# Patient Record
Sex: FEMALE | Race: WHITE | NOT HISPANIC OR LATINO | Employment: OTHER | URBAN - METROPOLITAN AREA
[De-identification: names, ages, dates, MRNs, and addresses within clinical notes are randomized per-mention and may not be internally consistent; named-entity substitution may affect disease eponyms.]

---

## 2019-05-06 ENCOUNTER — TELEPHONE (OUTPATIENT)
Dept: TELEMEDICINE | Facility: HOSPITAL | Age: 59
End: 2019-05-06

## 2019-05-06 NOTE — TELEPHONE ENCOUNTER
(Physician in Lead of Transfers) /Regional Referral Center note    Referring Physician:   Zak Holcomb MD (ED)    : Aurora Tijerina MD    Date of Acceptance: 05/06/2019    Referring Facility: [facility]      Reason for Referral: Sepsis and newly Dx-ed metastatic breast CA with large vascular breast mass    Report from Transferring Physician/Hospital course: 58F w morbid obesity who presented to ED with mass of R breast 8 x 8 x 5 cm, came to hospital b/c it ruptured and her bed was full of blood, BP 79/44, WBC 23K (6% bands), H/H 9.3/31, lytes wnl, Alb 2.4.  Lactic acid 3.1.  Vanc IV, Zosyn 2.25g.  BUN 17, Cr 1.2.   GFR 49.  On 3rd L of NS.  CT with IV contrast showed large R breast mass involving nipple, 8.3cm, small area of enhancement, likely large LNs in posterior R breast and axilla, enhancing soft tissue mass in L breast 4.7cm, also masses involving R chest wall, R 2nd rib, and lungs.  Vascular mass on R breast bubbled out from skin, CT chest shows possible metastatic CA several enlarged lymph nodes possible L breast and lung involvement. Family lives in . high bleeding risk and not stable for d/c home to follow up outpt.      I discussed with Dr PARHAM that there are no med surg beds available at  right now, and since pt needs to go to  to be established with Onc in her area where family is, ideal to wait for a bed at  rather than transfer to another [facility], and Dr PARHAM agreeable    VS: 1726  Afeb, 82, 124/76, 96 on RA    To Do List: Dr Holcomb to take care of patient tonight (he is ER staff so covers hospital pts overnight) and call us back tomorrow morning, then when bed available at [facility] we will facilitate transfer there tomorrow.  Dr Saez on  tomorrow.     Aurora Tijerina MD  Hospital Medicine Staff  Pager: [phone]  Cell: [phone]

## 2019-05-07 ENCOUNTER — HOSPITAL ENCOUNTER (INPATIENT)
Facility: HOSPITAL | Age: 59
LOS: 8 days | Discharge: HOME | DRG: 598 | End: 2019-05-15
Attending: INTERNAL MEDICINE | Admitting: INTERNAL MEDICINE
Payer: MEDICARE

## 2019-05-07 DIAGNOSIS — N28.89 LEFT RENAL MASS: ICD-10-CM

## 2019-05-07 DIAGNOSIS — R92.8 ABNORMAL MAMMOGRAM OF BOTH BREASTS: ICD-10-CM

## 2019-05-07 DIAGNOSIS — N63.20 BREAST MASS, LEFT: ICD-10-CM

## 2019-05-07 DIAGNOSIS — R59.0 AXILLARY ADENOPATHY: ICD-10-CM

## 2019-05-07 DIAGNOSIS — D62 ACUTE BLOOD LOSS ANEMIA: ICD-10-CM

## 2019-05-07 DIAGNOSIS — I50.9 CHF (CONGESTIVE HEART FAILURE): ICD-10-CM

## 2019-05-07 DIAGNOSIS — N63.0 BREAST MASS: ICD-10-CM

## 2019-05-07 DIAGNOSIS — N63.10 BREAST MASS, RIGHT: Primary | ICD-10-CM

## 2019-05-07 DIAGNOSIS — N63.20 LEFT BREAST MASS: ICD-10-CM

## 2019-05-07 DIAGNOSIS — N63.0 LUMP OR MASS IN BREAST: ICD-10-CM

## 2019-05-07 PROBLEM — E66.01 MORBID OBESITY: Status: ACTIVE | Noted: 2019-05-07

## 2019-05-07 PROBLEM — I87.2 VENOUS STASIS DERMATITIS OF BOTH LOWER EXTREMITIES: Status: ACTIVE | Noted: 2019-05-07

## 2019-05-07 PROBLEM — D72.829 LEUKOCYTOSIS: Status: ACTIVE | Noted: 2019-05-07

## 2019-05-07 LAB
ALBUMIN SERPL BCP-MCNC: 2.4 G/DL (ref 3.5–5.2)
ALP SERPL-CCNC: 90 U/L (ref 55–135)
ALT SERPL W/O P-5'-P-CCNC: 14 U/L (ref 10–44)
ANION GAP SERPL CALC-SCNC: 7 MMOL/L (ref 8–16)
ANISOCYTOSIS BLD QL SMEAR: SLIGHT
AST SERPL-CCNC: 27 U/L (ref 10–40)
BASOPHILS # BLD AUTO: 0.02 K/UL (ref 0–0.2)
BASOPHILS NFR BLD: 0.1 % (ref 0–1.9)
BILIRUB SERPL-MCNC: 0.6 MG/DL (ref 0.1–1)
BNP SERPL-MCNC: 107 PG/ML (ref 0–99)
BUN SERPL-MCNC: 17 MG/DL (ref 6–20)
BURR CELLS BLD QL SMEAR: ABNORMAL
CALCIUM SERPL-MCNC: 8.9 MG/DL (ref 8.7–10.5)
CHLORIDE SERPL-SCNC: 101 MMOL/L (ref 95–110)
CO2 SERPL-SCNC: 24 MMOL/L (ref 23–29)
CREAT SERPL-MCNC: 0.9 MG/DL (ref 0.5–1.4)
DACRYOCYTES BLD QL SMEAR: ABNORMAL
DIASTOLIC DYSFUNCTION: NO
DIFFERENTIAL METHOD: ABNORMAL
EOSINOPHIL # BLD AUTO: 0 K/UL (ref 0–0.5)
EOSINOPHIL NFR BLD: 0.1 % (ref 0–8)
ERYTHROCYTE [DISTWIDTH] IN BLOOD BY AUTOMATED COUNT: 15.3 % (ref 11.5–14.5)
EST. GFR  (AFRICAN AMERICAN): >60 ML/MIN/1.73 M^2
EST. GFR  (NON AFRICAN AMERICAN): >60 ML/MIN/1.73 M^2
ESTIMATED PA SYSTOLIC PRESSURE: 41.18
GLUCOSE SERPL-MCNC: 141 MG/DL (ref 70–110)
HCT VFR BLD AUTO: 28.3 % (ref 37–48.5)
HGB BLD-MCNC: 9 G/DL (ref 12–16)
LACTATE SERPL-SCNC: 1.2 MMOL/L (ref 0.5–2.2)
LYMPHOCYTES # BLD AUTO: 1.2 K/UL (ref 1–4.8)
LYMPHOCYTES NFR BLD: 3.9 % (ref 18–48)
MCH RBC QN AUTO: 28 PG (ref 27–31)
MCHC RBC AUTO-ENTMCNC: 31.8 G/DL (ref 32–36)
MCV RBC AUTO: 88 FL (ref 82–98)
MONOCYTES # BLD AUTO: 1.4 K/UL (ref 0.3–1)
MONOCYTES NFR BLD: 4.7 % (ref 4–15)
NEUTROPHILS # BLD AUTO: 27.6 K/UL (ref 1.8–7.7)
NEUTROPHILS NFR BLD: 91.7 % (ref 38–73)
OVALOCYTES BLD QL SMEAR: ABNORMAL
PLATELET # BLD AUTO: 349 K/UL (ref 150–350)
PLATELET BLD QL SMEAR: ABNORMAL
PMV BLD AUTO: 9.7 FL (ref 9.2–12.9)
POIKILOCYTOSIS BLD QL SMEAR: SLIGHT
POLYCHROMASIA BLD QL SMEAR: ABNORMAL
POTASSIUM SERPL-SCNC: 4.2 MMOL/L (ref 3.5–5.1)
PROT SERPL-MCNC: 6.3 G/DL (ref 6–8.4)
RBC # BLD AUTO: 3.21 M/UL (ref 4–5.4)
RETIRED EF AND QEF - SEE NOTES: 65 (ref 55–65)
SODIUM SERPL-SCNC: 132 MMOL/L (ref 136–145)
TRICUSPID VALVE REGURGITATION: ABNORMAL
WBC # BLD AUTO: 30.26 K/UL (ref 3.9–12.7)

## 2019-05-07 PROCEDURE — 80053 COMPREHEN METABOLIC PANEL: CPT

## 2019-05-07 PROCEDURE — 85025 COMPLETE CBC W/AUTO DIFF WBC: CPT

## 2019-05-07 PROCEDURE — 25500020 PHARM REV CODE 255: Performed by: INTERNAL MEDICINE

## 2019-05-07 PROCEDURE — 11000001 HC ACUTE MED/SURG PRIVATE ROOM

## 2019-05-07 PROCEDURE — 25000003 PHARM REV CODE 250: Performed by: NURSE PRACTITIONER

## 2019-05-07 PROCEDURE — 63600175 PHARM REV CODE 636 W HCPCS: Performed by: INTERNAL MEDICINE

## 2019-05-07 PROCEDURE — 63600175 PHARM REV CODE 636 W HCPCS: Performed by: NURSE PRACTITIONER

## 2019-05-07 PROCEDURE — 87040 BLOOD CULTURE FOR BACTERIA: CPT | Mod: 59

## 2019-05-07 PROCEDURE — C8929 TTE W OR WO FOL WCON,DOPPLER: HCPCS

## 2019-05-07 PROCEDURE — 36415 COLL VENOUS BLD VENIPUNCTURE: CPT

## 2019-05-07 PROCEDURE — 93306 TTE W/DOPPLER COMPLETE: CPT | Mod: 26,,, | Performed by: INTERNAL MEDICINE

## 2019-05-07 PROCEDURE — 83880 ASSAY OF NATRIURETIC PEPTIDE: CPT

## 2019-05-07 PROCEDURE — 25500020 PHARM REV CODE 255: Performed by: NURSE PRACTITIONER

## 2019-05-07 PROCEDURE — 25000003 PHARM REV CODE 250: Performed by: INTERNAL MEDICINE

## 2019-05-07 PROCEDURE — 83605 ASSAY OF LACTIC ACID: CPT

## 2019-05-07 PROCEDURE — 93306 2D ECHO WITH COLOR FLOW DOPPLER: ICD-10-PCS | Mod: 26,,, | Performed by: INTERNAL MEDICINE

## 2019-05-07 RX ORDER — FUROSEMIDE 10 MG/ML
60 INJECTION INTRAMUSCULAR; INTRAVENOUS ONCE
Status: COMPLETED | OUTPATIENT
Start: 2019-05-07 | End: 2019-05-07

## 2019-05-07 RX ORDER — HYDROCODONE BITARTRATE AND ACETAMINOPHEN 7.5; 325 MG/1; MG/1
1 TABLET ORAL EVERY 6 HOURS PRN
Status: DISCONTINUED | OUTPATIENT
Start: 2019-05-07 | End: 2019-05-10

## 2019-05-07 RX ORDER — MIRTAZAPINE 15 MG/1
15 TABLET, FILM COATED ORAL NIGHTLY
Status: DISCONTINUED | OUTPATIENT
Start: 2019-05-07 | End: 2019-05-07

## 2019-05-07 RX ORDER — CEFEPIME HYDROCHLORIDE 2 G/50ML
2 INJECTION, SOLUTION INTRAVENOUS
Status: DISCONTINUED | OUTPATIENT
Start: 2019-05-07 | End: 2019-05-09

## 2019-05-07 RX ORDER — RAMELTEON 8 MG/1
8 TABLET ORAL NIGHTLY PRN
Status: DISCONTINUED | OUTPATIENT
Start: 2019-05-07 | End: 2019-05-15 | Stop reason: HOSPADM

## 2019-05-07 RX ORDER — FUROSEMIDE 10 MG/ML
40 INJECTION INTRAMUSCULAR; INTRAVENOUS 2 TIMES DAILY
Status: DISCONTINUED | OUTPATIENT
Start: 2019-05-08 | End: 2019-05-09

## 2019-05-07 RX ADMIN — CEFEPIME HYDROCHLORIDE 2 G: 2 INJECTION, SOLUTION INTRAVENOUS at 10:05

## 2019-05-07 RX ADMIN — HYDROCODONE BITARTRATE AND ACETAMINOPHEN 1 TABLET: 7.5; 325 TABLET ORAL at 08:05

## 2019-05-07 RX ADMIN — IOHEXOL 100 ML: 350 INJECTION, SOLUTION INTRAVENOUS at 06:05

## 2019-05-07 RX ADMIN — VANCOMYCIN HYDROCHLORIDE 3000 MG: 1 INJECTION, POWDER, FOR SOLUTION INTRAVENOUS at 07:05

## 2019-05-07 RX ADMIN — RAMELTEON 8 MG: 8 TABLET, FILM COATED ORAL at 10:05

## 2019-05-07 RX ADMIN — IOHEXOL 50 ML: 350 INJECTION, SOLUTION INTRAVENOUS at 04:05

## 2019-05-07 RX ADMIN — FUROSEMIDE 60 MG: 10 INJECTION, SOLUTION INTRAVENOUS at 04:05

## 2019-05-07 NOTE — PROGRESS NOTES
"[facility] Hospital Medicine  Progress Note    Patient Name: Isadora Colin  MRN: [id number]  Patient Class: IP- Inpatient   Admission Date: 5/7/2019  Length of Stay: 0 days  Attending Physician: Sagar Cloud, *  Primary Care Provider: JOLENE PHIPPS III, MD  Subjective:     Principal Problem:Breast mass, right    HPI:  Isadora Colin is a 58 year old female with history of morbid obesity, peripheral edema,chronic venous stasis dermatitis who presented to [facility] on May 6, 2018 for further evaluation of bleeding right breast mass. She recalls noticing the breast mass around 4 weeks ago or "so". This was never evaluated by a physician. She denies fever, abdominal pain, chills, or weight loss. In ED, she was noted hypotensive (79 systolic) without tachycardia which responded to IVF's. Labs reflected lactic acidosis, leukocytosis(23k), and normocytic anemia. Lactic acidosis resolved after IVF's.  CT Chest revealed 8,3 cm right breast mass, 4.7 cm left breast mass, mass involving right chest wall and rib, and surrounding lymph node involvement. She was treated empirically with Zosyn and Vancomycin empirically. Imaging negative for infectious process. [facility] contacted for transfer for further sepsis work up and Oncology consult. She had not further bleeding from protruding breast mass. She has received 2 units of blood. She has never had a mammogram or colonoscopy. She takes Lasix for swelling, but denies having an Echocardiogram.                     Hospital Course:  No notes on file    Past Medical History:   Diagnosis Date    Morbid obesity 5/7/2019    Venous stasis dermatitis of both lower extremities 5/7/2019       No past surgical history on file.    Review of patient's allergies indicates:  No Known Allergies    No current facility-administered medications on file prior to encounter.      No current outpatient medications on file prior to encounter. "     Family History     None        Tobacco Use    Smoking status: Not on file   Substance and Sexual Activity    Alcohol use: Not on file    Drug use: Not on file    Sexual activity: Not on file     Review of Systems   Constitutional: Negative for activity change, diaphoresis, fatigue and unexpected weight change.   HENT: Negative for congestion, ear pain and sore throat.    Eyes: Negative.    Respiratory: Negative for shortness of breath and wheezing.    Cardiovascular: Positive for leg swelling. Negative for chest pain and palpitations.   Gastrointestinal: Negative for abdominal pain, constipation, diarrhea and vomiting.   Endocrine: Negative.    Genitourinary: Negative for flank pain, hematuria and urgency.   Musculoskeletal: Negative for joint swelling and neck pain.   Skin: Negative for pallor.        Bleeding right breast mass     Neurological: Negative for seizures, syncope and light-headedness.   Hematological: Negative.    Psychiatric/Behavioral: Negative.       Objective:     Vital Signs (Most Recent):    Vital Signs (24h Range):  Temp:  [97.4 °F (36.3 °C)-98.2 °F (36.8 °C)] 97.4 °F (36.3 °C)  Pulse:  [82-97] 97  Resp:  [18-20] 20  SpO2:  [96 %-100 %] 100 %  BP: (124-146)/(72-76) 146/72        There is no height or weight on file to calculate BMI.    Physical Exam   Constitutional: She is oriented to person, place, and time. She appears well-developed and well-nourished.   HENT:   Head: Normocephalic and atraumatic.   Eyes: EOM are normal.   Neck: Normal range of motion. Neck supple.   Cardiovascular: Normal rate, regular rhythm and normal heart sounds.   No murmur heard.  Pulmonary/Chest: Effort normal and breath sounds normal. No respiratory distress.   Communicative dyspnea  Audible crackles noted   Abdominal: Soft. Bowel sounds are normal. She exhibits no distension. There is no tenderness.   Protuberant     Musculoskeletal: Normal range of motion. She exhibits no edema.   Neurological: She is  alert and oriented to person, place, and time.   Skin: Skin is warm and dry.   Venous stasis changes without signs of infection.    Right protruding vascular breast mass. Left palpable breast mass.    Psychiatric: Her mood appears anxious.         CRANIAL NERVES     CN III, IV, VI   Extraocular motions are normal.           Significant Labs: repeat labs are pending    Significant Imaging:               Assessment/Plan:      * Breast mass, right  --concerning for malignancy. CT at previous facility showed mass involving bilateral breast, lung, and bone. Will order CT abdomen Pelvis for further evaluation  --Oncology consulted  --General Surgery consulted for tissue biopsy        Acute blood loss anemia  --normocytic anemia s/p 2 units PRBC's   --could be related to malignancy  --has never had colonoscopy      Venous stasis dermatitis of both lower extremities  --No signs of infection  --daily skin care    Morbid obesity  Counseled on need for weight loss      Leukocytosis  -infectious etiology not found  --reactive vs ?sepsis which is unlikely  --CT Chest negative for infectious process. CT abdomen for further evaluation  --empiric Zosyn and Vancomycin      VTE Risk Mitigation (From admission, onward)    None              Trena Craig NP  Department of Hospital Medicine   [facility]

## 2019-05-07 NOTE — ASSESSMENT & PLAN NOTE
--normocytic anemia s/p 2 units PRBC's   --could be related to malignancy  --has never had colonoscopy

## 2019-05-07 NOTE — ASSESSMENT & PLAN NOTE
--concerning for malignancy. CT at previous facility showed mass involving bilateral breast, lung, and bone. Will order CT abdomen Pelvis for further evaluation  --Oncology consulted  --General Surgery consulted for tissue biopsy

## 2019-05-07 NOTE — NURSING
PT arrived to floor via air med stretcher. MD notified. Pt had blood finishing upon arrival. VSS. Alert and oriented. Awaiting orders.

## 2019-05-07 NOTE — PLAN OF CARE
(Physician in Lead of Transfers) /Regional Referral Center Note      Patients name/MRN: Isadora Colin/MRN [id number]     Referring Physician or Mid-Level provider giving report: Dr. De Rosas (Family Medicine)  Contact number: [phone]    Referral Facility: [facility] in [place], [place]    Date/Time of Acceptance: 5/7/2019 8:50 AM    : Priya Saez MD; Case discussed with GAMA Russ at [facility] for [facility] medicine and patient okay for Med/Surg bed at [facility] and Dr. Jd Newell to be accepting physician for Hospital Medicine at [facility]     Accepting facility: [facility]    Reason for transfer request: Needs higher level of care for bleeding mass from right breast; Needs Heme/Onc for likely metastatic breast cancer; Family from [facility] and family requesting hospital in [facility]     Report from Physician/Mid-Level Provider/HPI: 57 y/o female with morbid obesity and venous stasis dermatitis on oral Lasix at home who presented to [facility] ED on 5/6 noting bleeding from right breast. Patient noted to on exam mass to right breast that measures 8.5 cm. Patient came to hospital because it ruptured, and her bed was full of blood at home. Patient self-reported on and off bleeding from right breast for about 3 weeks. Initial BP 79/44, WBC 23,000 (6% bands), H/H 9.3/31. Electrolytes unremarkable except for BUN 17, Cr 1.2 with GFR 49 and Alb 2.4 and lactic acid 3.1. U/A negative for infection. Patient given IV Vancomycin and Zosyn 2.25 g + 3 liters of normal saline in ED overnight on 5/6. CT scan of chest done with IV contrast showed large right breast mass involving nipple, 8.3 cm, small area of enhancement, likely large LNs in posterior right breast and axilla, enhancing soft tissue mass in left breast 4.7 cm, also masses involving right chest wall, right 2nd rib, and lungs. No pneumonia or infiltrate noted on CT scan of  chest. Vascular mass on right breast bubbled out from skin, CT chest shows likely metastatic cancer with several enlarged lymph nodes possible left breast and lung involvement. Family lives in [place] and patient wanted facility in [facility]. High bleeding risk from right breast and not stable for discharge home to follow up outpatient.  This am, 5/7, Dr. Rosas at [facility] reports patient had minor bleeding from right breast overnight and H/H this am 7.3/24. Patient has been typed and crossed and plan is to start blood transfusion prior to transfer. Patient with very friable mass to right breast area. Patient medically stable and hemodynamically stable and okay for med/surg bed at [facility].    VS: BP: 146/76  P: 97  T: 97.4 F  R: 16 Pulse Ox: 100% on 2 liters    To Do List upon arrival:     Admit to Inpatient to Med/Surg   Monitor for bleeding from right breast   Serial H/H and transfuse to keep Hgb > 7   Work-up for infection    Will need biopsy to confirm malignancy and Heme/Onc evaluation     Priya Saez MD  Senior Staff Physician  Department of Hospital Medicine  Patient Flow Center/   [phone]

## 2019-05-07 NOTE — ASSESSMENT & PLAN NOTE
-infectious etiology not found  --reactive vs ?sepsis which is unlikely  --CT Chest negative for infectious process. CT abdomen for further evaluation  --empiric Zosyn and Vancomycin

## 2019-05-07 NOTE — SUBJECTIVE & OBJECTIVE
Past Medical History:   Diagnosis Date    Morbid obesity 5/7/2019    Venous stasis dermatitis of both lower extremities 5/7/2019       No past surgical history on file.    Review of patient's allergies indicates:  No Known Allergies    No current facility-administered medications on file prior to encounter.      No current outpatient medications on file prior to encounter.     Family History     None        Tobacco Use    Smoking status: Not on file   Substance and Sexual Activity    Alcohol use: Not on file    Drug use: Not on file    Sexual activity: Not on file     Review of Systems   Constitutional: Negative for activity change, diaphoresis, fatigue and unexpected weight change.   HENT: Negative for congestion, ear pain and sore throat.    Eyes: Negative.    Respiratory: Negative for shortness of breath and wheezing.    Cardiovascular: Positive for leg swelling. Negative for chest pain and palpitations.   Gastrointestinal: Negative for abdominal pain, constipation, diarrhea and vomiting.   Endocrine: Negative.    Genitourinary: Negative for flank pain, hematuria and urgency.   Musculoskeletal: Negative for joint swelling and neck pain.   Skin: Negative for pallor.        Bleeding right breast mass     Neurological: Negative for seizures, syncope and light-headedness.   Hematological: Negative.    Psychiatric/Behavioral: Negative.       Objective:     Vital Signs (Most Recent):    Vital Signs (24h Range):  Temp:  [97.4 °F (36.3 °C)-98.2 °F (36.8 °C)] 97.4 °F (36.3 °C)  Pulse:  [82-97] 97  Resp:  [18-20] 20  SpO2:  [96 %-100 %] 100 %  BP: (124-146)/(72-76) 146/72        There is no height or weight on file to calculate BMI.    Physical Exam   Constitutional: She is oriented to person, place, and time. She appears well-developed and well-nourished.   HENT:   Head: Normocephalic and atraumatic.   Eyes: EOM are normal.   Neck: Normal range of motion. Neck supple.   Cardiovascular: Normal rate, regular rhythm  and normal heart sounds.   No murmur heard.  Pulmonary/Chest: Effort normal and breath sounds normal. No respiratory distress.   Communicative dyspnea  Audible crackles noted   Abdominal: Soft. Bowel sounds are normal. She exhibits no distension. There is no tenderness.   Protuberant     Musculoskeletal: Normal range of motion. She exhibits no edema.   Neurological: She is alert and oriented to person, place, and time.   Skin: Skin is warm and dry.   Venous stasis changes without signs of infection.    Right protruding vascular breast mass. Left palpable breast mass.    Psychiatric: Her mood appears anxious.         CRANIAL NERVES     CN III, IV, VI   Extraocular motions are normal.           Significant Labs: repeat labs are pending    Significant Imaging:

## 2019-05-07 NOTE — HPI
"Isadora Colin is a 58 year old female with history of morbid obesity, peripheral edema,chronic venous stasis dermatitis who presented to [facility] on May 6, 2018 for further evaluation of bleeding right breast mass. She recalls noticing the breast mass around 4 weeks ago or "so". This was never evaluated by a physician. She denies fever, abdominal pain, chills, or weight loss. In ED, she was noted hypotensive (79 systolic) without tachycardia which responded to IVF's. Labs reflected lactic acidosis, leukocytosis(23k), and normocytic anemia. Lactic acidosis resolved after IVF's.  CT Chest revealed 8,3 cm right breast mass, 4.7 cm left breast mass, mass involving right chest wall and rib, and surrounding lymph node involvement. She was treated empirically with Zosyn and Vancomycin empirically. Imaging negative for infectious process. [facility] contacted for transfer for further sepsis work up and Oncology consult. She had not further bleeding from protruding breast mass. She has received 2 units of blood. She has never had a mammogram or colonoscopy. She takes Lasix for swelling, but denies having an Echocardiogram.                   "

## 2019-05-08 DIAGNOSIS — N63.20 LEFT BREAST MASS: ICD-10-CM

## 2019-05-08 DIAGNOSIS — N63.10 BREAST MASS, RIGHT: Primary | ICD-10-CM

## 2019-05-08 PROBLEM — N28.89 LEFT RENAL MASS: Status: ACTIVE | Noted: 2019-05-08

## 2019-05-08 PROBLEM — R93.89 ABNORMAL CHEST CT: Status: ACTIVE | Noted: 2019-05-08

## 2019-05-08 LAB
ANION GAP SERPL CALC-SCNC: 9 MMOL/L (ref 8–16)
BASOPHILS # BLD AUTO: 0.02 K/UL (ref 0–0.2)
BASOPHILS NFR BLD: 0.1 % (ref 0–1.9)
BUN SERPL-MCNC: 15 MG/DL (ref 6–20)
CALCIUM SERPL-MCNC: 8.8 MG/DL (ref 8.7–10.5)
CHLORIDE SERPL-SCNC: 102 MMOL/L (ref 95–110)
CO2 SERPL-SCNC: 23 MMOL/L (ref 23–29)
CREAT SERPL-MCNC: 0.7 MG/DL (ref 0.5–1.4)
DIFFERENTIAL METHOD: ABNORMAL
EOSINOPHIL # BLD AUTO: 0.1 K/UL (ref 0–0.5)
EOSINOPHIL NFR BLD: 0.3 % (ref 0–8)
ERYTHROCYTE [DISTWIDTH] IN BLOOD BY AUTOMATED COUNT: 15.4 % (ref 11.5–14.5)
EST. GFR  (AFRICAN AMERICAN): >60 ML/MIN/1.73 M^2
EST. GFR  (NON AFRICAN AMERICAN): >60 ML/MIN/1.73 M^2
GLUCOSE SERPL-MCNC: 146 MG/DL (ref 70–110)
HCT VFR BLD AUTO: 25.3 % (ref 37–48.5)
HGB BLD-MCNC: 8.1 G/DL (ref 12–16)
INR PPP: 0.9 (ref 0.8–1.2)
LYMPHOCYTES # BLD AUTO: 1.1 K/UL (ref 1–4.8)
LYMPHOCYTES NFR BLD: 5.3 % (ref 18–48)
MCH RBC QN AUTO: 27.9 PG (ref 27–31)
MCHC RBC AUTO-ENTMCNC: 32 G/DL (ref 32–36)
MCV RBC AUTO: 87 FL (ref 82–98)
MONOCYTES # BLD AUTO: 1.1 K/UL (ref 0.3–1)
MONOCYTES NFR BLD: 5.2 % (ref 4–15)
NEUTROPHILS # BLD AUTO: 18 K/UL (ref 1.8–7.7)
NEUTROPHILS NFR BLD: 89.1 % (ref 38–73)
PLATELET # BLD AUTO: 256 K/UL (ref 150–350)
PMV BLD AUTO: 9.4 FL (ref 9.2–12.9)
POTASSIUM SERPL-SCNC: 3.6 MMOL/L (ref 3.5–5.1)
PROTHROMBIN TIME: 10.1 SEC (ref 9–12.5)
RBC # BLD AUTO: 2.9 M/UL (ref 4–5.4)
SODIUM SERPL-SCNC: 134 MMOL/L (ref 136–145)
WBC # BLD AUTO: 20.15 K/UL (ref 3.9–12.7)

## 2019-05-08 PROCEDURE — 11000001 HC ACUTE MED/SURG PRIVATE ROOM

## 2019-05-08 PROCEDURE — 99223 1ST HOSP IP/OBS HIGH 75: CPT | Mod: ,,, | Performed by: INTERNAL MEDICINE

## 2019-05-08 PROCEDURE — 93010 EKG 12-LEAD: ICD-10-PCS | Mod: ,,, | Performed by: INTERNAL MEDICINE

## 2019-05-08 PROCEDURE — 63600175 PHARM REV CODE 636 W HCPCS: Performed by: NURSE PRACTITIONER

## 2019-05-08 PROCEDURE — 25500020 PHARM REV CODE 255: Performed by: INTERNAL MEDICINE

## 2019-05-08 PROCEDURE — 25000003 PHARM REV CODE 250: Performed by: NURSE PRACTITIONER

## 2019-05-08 PROCEDURE — 99222 1ST HOSP IP/OBS MODERATE 55: CPT | Mod: ICN,CMP,, | Performed by: RADIOLOGY

## 2019-05-08 PROCEDURE — 25000003 PHARM REV CODE 250: Performed by: INTERNAL MEDICINE

## 2019-05-08 PROCEDURE — 99233 PR SUBSEQUENT HOSPITAL CARE,LEVL III: ICD-10-PCS | Mod: ,,, | Performed by: SURGERY

## 2019-05-08 PROCEDURE — 93041 RHYTHM ECG TRACING: CPT

## 2019-05-08 PROCEDURE — 63600175 PHARM REV CODE 636 W HCPCS: Performed by: INTERNAL MEDICINE

## 2019-05-08 PROCEDURE — 80048 BASIC METABOLIC PNL TOTAL CA: CPT

## 2019-05-08 PROCEDURE — 99222 PR INITIAL HOSPITAL CARE,LEVL II: ICD-10-PCS | Mod: ICN,CMP,, | Performed by: RADIOLOGY

## 2019-05-08 PROCEDURE — 27000221 HC OXYGEN, UP TO 24 HOURS

## 2019-05-08 PROCEDURE — 99900035 HC TECH TIME PER 15 MIN (STAT)

## 2019-05-08 PROCEDURE — 93010 ELECTROCARDIOGRAM REPORT: CPT | Mod: ,,, | Performed by: INTERNAL MEDICINE

## 2019-05-08 PROCEDURE — 93005 ELECTROCARDIOGRAM TRACING: CPT

## 2019-05-08 PROCEDURE — 85025 COMPLETE CBC W/AUTO DIFF WBC: CPT

## 2019-05-08 PROCEDURE — 36415 COLL VENOUS BLD VENIPUNCTURE: CPT

## 2019-05-08 PROCEDURE — 99223 PR INITIAL HOSPITAL CARE,LEVL III: ICD-10-PCS | Mod: ,,, | Performed by: INTERNAL MEDICINE

## 2019-05-08 PROCEDURE — 85610 PROTHROMBIN TIME: CPT

## 2019-05-08 PROCEDURE — 99233 SBSQ HOSP IP/OBS HIGH 50: CPT | Mod: ,,, | Performed by: SURGERY

## 2019-05-08 RX ORDER — POTASSIUM CHLORIDE 750 MG/1
20 CAPSULE, EXTENDED RELEASE ORAL ONCE
Status: ON HOLD | COMMUNITY
End: 2019-05-15 | Stop reason: HOSPADM

## 2019-05-08 RX ORDER — FUROSEMIDE 40 MG/1
40 TABLET ORAL DAILY
COMMUNITY

## 2019-05-08 RX ADMIN — VANCOMYCIN HYDROCHLORIDE 1750 MG: 750 INJECTION, POWDER, LYOPHILIZED, FOR SOLUTION INTRAVENOUS at 08:05

## 2019-05-08 RX ADMIN — FUROSEMIDE 40 MG: 10 INJECTION, SOLUTION INTRAVENOUS at 05:05

## 2019-05-08 RX ADMIN — FUROSEMIDE 40 MG: 10 INJECTION, SOLUTION INTRAVENOUS at 08:05

## 2019-05-08 RX ADMIN — CEFEPIME HYDROCHLORIDE 2 G: 2 INJECTION, SOLUTION INTRAVENOUS at 06:05

## 2019-05-08 RX ADMIN — RAMELTEON 8 MG: 8 TABLET, FILM COATED ORAL at 09:05

## 2019-05-08 RX ADMIN — IOHEXOL 75 ML: 350 INJECTION, SOLUTION INTRAVENOUS at 06:05

## 2019-05-08 RX ADMIN — HYDROCODONE BITARTRATE AND ACETAMINOPHEN 1 TABLET: 7.5; 325 TABLET ORAL at 01:05

## 2019-05-08 RX ADMIN — HYDROCODONE BITARTRATE AND ACETAMINOPHEN 1 TABLET: 7.5; 325 TABLET ORAL at 09:05

## 2019-05-08 RX ADMIN — HYDROCODONE BITARTRATE AND ACETAMINOPHEN 1 TABLET: 7.5; 325 TABLET ORAL at 06:05

## 2019-05-08 RX ADMIN — CEFEPIME HYDROCHLORIDE 2 G: 2 INJECTION, SOLUTION INTRAVENOUS at 08:05

## 2019-05-08 RX ADMIN — VANCOMYCIN HYDROCHLORIDE 1750 MG: 750 INJECTION, POWDER, LYOPHILIZED, FOR SOLUTION INTRAVENOUS at 09:05

## 2019-05-08 NOTE — CONSULTS
[facility]  Hematology/Oncology  Consult Note    Patient Name: Isadora Colin  MRN: [id number]  Admission Date: 5/7/2019  Hospital Length of Stay: 1 days  Code Status: No Order   Attending Provider: Sagar Cloud, *  Consulting Provider: Blaire Arguello NP  Primary Care Physician: OJLENE PHIPPS III, MD  Principal Problem:Breast mass, right    Consults  Subjective:     HPI:  58 y.o female history of morbid obesity, peripheral edema (on Lasix at home),chronic venous stasis dermatitis who presented to [facility] on May 6, 2018 for further evaluation of bleeding right breast mass which patient reports onset approximately 4 weeks ago and has progressively gotten larger.  Associated symptoms include right breast pain.  Patient denies any other symptoms. She denies CP, SOB, bowel or urinary complaints, fever, abdominal pain, chills, or weight loss.     At [facility] in ED, she was noted hypotensive (79 systolic) without tachycardia which responded to IVF's. Labs reflected lactic acidosis, leukocytosis(23k), and normocytic anemia Hg 9.3. Lactic acidosis resolved after IVF's.  CT Chest revealed 8.3 cm right breast mass, 4.7 cm left breast mass, mass involving right chest wall and rib, and surrounding lymph node involvement. She was treated empirically with Zosyn and Vancomycin IV abx. Imaging negative for infectious process.     Patient was transferred to [facility] for higher level care. She has received 2 units of blood. She has never had a mammogram or colonoscopy. Does not have regular PCP.     CT Abdomen/Pelvis done at [facility] revealed 5.4 cm renal mass. ID consulted for elevated WBC. General surgery consulted for mass biopsy. General surgery awaiting r/o sepsis to proceed with biopsy               Oncology Treatment Plan:   [No treatment plan]    Medications:  Continuous Infusions:  Scheduled Meds:   ceFEPime (MAXIPIME) IVPB  2 g Intravenous Q12H     furosemide  40 mg Intravenous BID    vancomycin (VANCOCIN) IVPB  1,750 mg Intravenous Q12H     PRN Meds:HYDROcodone-acetaminophen, ramelteon     Review of patient's allergies indicates:  No Known Allergies     Past Medical History:   Diagnosis Date    Encounter for blood transfusion     Morbid obesity 5/7/2019    Venous stasis dermatitis of both lower extremities 5/7/2019     History reviewed. No pertinent surgical history.  Family History     None        Tobacco Use    Smoking status: Current Every Day Smoker     Packs/day: 1.00   Substance and Sexual Activity    Alcohol use: Not Currently    Drug use: Never    Sexual activity: Not Currently       Review of Systems   Constitutional: Negative for activity change, appetite change, chills, diaphoresis, fatigue, fever and unexpected weight change.   HENT: Negative for congestion, mouth sores, nosebleeds, sore throat, trouble swallowing and voice change.    Eyes: Negative for photophobia and visual disturbance.   Respiratory: Negative for cough, chest tightness, shortness of breath and wheezing.    Cardiovascular: Negative for chest pain, palpitations and leg swelling.   Gastrointestinal: Negative for abdominal distention, abdominal pain, anal bleeding, blood in stool, constipation, diarrhea, nausea, rectal pain and vomiting.   Genitourinary: Negative for difficulty urinating, dysuria and hematuria.   Musculoskeletal: Negative for arthralgias, back pain and myalgias.   Skin: Positive for wound. Negative for pallor and rash.   Neurological: Negative for dizziness, syncope, weakness, light-headedness and headaches.   Hematological: Negative for adenopathy. Does not bruise/bleed easily.   Psychiatric/Behavioral: The patient is nervous/anxious.      Objective:     Vital Signs (Most Recent):  Temp: 97.7 °F (36.5 °C) (05/08/19 1213)  Pulse: 83 (05/08/19 1213)  Resp: 18 (05/08/19 1213)  BP: 129/60 (05/08/19 1213)  SpO2: 98 % (05/08/19 1213) Vital Signs (24h  Range):  Temp:  [97.7 °F (36.5 °C)-99.6 °F (37.6 °C)] 97.7 °F (36.5 °C)  Pulse:  [83-95] 83  Resp:  [18-20] 18  SpO2:  [98 %-100 %] 98 %  BP: (102-129)/(45-60) 129/60     Weight: (!) 149.2 kg (329 lb)  Body mass index is 53.1 kg/m².  Body surface area is 2.64 meters squared.      Intake/Output Summary (Last 24 hours) at 5/8/2019 1217  Last data filed at 5/8/2019 0500  Gross per 24 hour   Intake 50 ml   Output 4200 ml   Net -4150 ml       Physical Exam   Constitutional: She is oriented to person, place, and time. She appears well-developed and well-nourished. She is cooperative.   HENT:   Head: Normocephalic.   Right Ear: Hearing normal. No drainage.   Left Ear: Hearing normal. No drainage.   Nose: Nose normal.   Mouth/Throat: Oropharynx is clear and moist.   Eyes: Conjunctivae, EOM and lids are normal. Right eye exhibits no discharge. Left eye exhibits no discharge. No scleral icterus.   Neck: Normal range of motion. No thyroid mass present.   Cardiovascular: Normal rate, regular rhythm and normal heart sounds.   No murmur heard.  Pulmonary/Chest: Effort normal. No respiratory distress. She has decreased breath sounds. She has no wheezes. She has no rhonchi. She has no rales. She exhibits mass. Right breast exhibits skin change and tenderness. Left breast exhibits no nipple discharge.       Abdominal: Soft. Bowel sounds are normal. She exhibits no distension. There is no tenderness.   Genitourinary:   Genitourinary Comments: deferred   Musculoskeletal: Normal range of motion. She exhibits edema.   BLE edema   Lymphadenopathy:        Head (right side): No submandibular, no preauricular and no posterior auricular adenopathy present.        Head (left side): No submandibular, no preauricular and no posterior auricular adenopathy present.        Right cervical: No superficial cervical adenopathy present.       Left cervical: No superficial cervical adenopathy present.   Neurological: She is alert and oriented to person,  place, and time.   Skin: Skin is warm and dry.   Psychiatric: Her speech is normal and behavior is normal. Thought content normal. Her mood appears anxious. She exhibits a depressed mood.   Vitals reviewed.      Significant Labs:   BMP:   Recent Labs   Lab 05/07/19 1715 05/08/19  0542   * 146*   * 134*   K 4.2 3.6    102   CO2 24 23   BUN 17 15   CREATININE 0.9 0.7   CALCIUM 8.9 8.8   , CBC:   Recent Labs   Lab 05/07/19 1715 05/08/19  0542   WBC 30.26* 20.15*   HGB 9.0* 8.1*   HCT 28.3* 25.3*    256   , LFTs:   Recent Labs   Lab 05/07/19 1715   ALT 14   AST 27   ALKPHOS 90   BILITOT 0.6   PROT 6.3   ALBUMIN 2.4*    and All pertinent labs from the last 24 hours have been reviewed.    Diagnostic Results:  I have reviewed all pertinent imaging results/findings within the past 24 hours.    Assessment/Plan:     * Breast mass, right  May 8, 2019  --General surgery consult for breast mass biopsy. Gen surg would like to r/o sepsis prior to proceeding  --I spoke with Dr. Cunha, Rad/Onc who agree to see patient to determine possible radiation options  --CT Chest/Abdomen/Pelvis concerning for metastatic disease. Likely more than one primary. We will need biopsy results and further staging to determine treatment recommendations  --hemoglobin 8.1. No urgent need for additional blood transfusion. Consider PRBCs transfusion for hg < 8. WBC 20.1, likely reactive. No clear infectious source identified. ID consulted. Await ID recs  --BMP unremarkable  --Continue supportive care    Left renal mass  May 8, 2019  --Outpatient consult to Urology for further workup. No urgent need for inpatient biopsy. Likely slow growing    Leukocytosis  May 8, 2019  --WBC trending downward. No clear etiology thus far. ID consulted. General surgery would like to r/o sepsis prior to perform biopsy. Continue empiric IV abx        Thank you for your consult. I will follow-up with patient. Please contact us if you have any  additional questions.    Blaire Arguello NP  Hematology/Oncology  [facility]

## 2019-05-08 NOTE — SUBJECTIVE & OBJECTIVE
Interval History: patient is overwhelmed with everything. She states that she may have to go back home for treatment and care. Updated on plan of care.    Review of Systems   Constitutional: Negative for activity change, diaphoresis, fatigue and unexpected weight change.   HENT: Negative for congestion, ear pain and sore throat.    Eyes: Negative.    Respiratory: Negative for shortness of breath and wheezing.    Cardiovascular: Positive for leg swelling. Negative for chest pain and palpitations.   Gastrointestinal: Negative for abdominal pain, constipation, diarrhea and vomiting.   Endocrine: Negative.    Genitourinary: Negative for flank pain, hematuria and urgency.   Musculoskeletal: Negative for joint swelling and neck pain.   Skin: Negative for pallor.        Bleeding right breast mass     Neurological: Negative for seizures, syncope and light-headedness.   Hematological: Negative.    Psychiatric/Behavioral: Negative.       Objective:     Vital Signs (Most Recent):  Temp: 99.6 °F (37.6 °C) (05/08/19 0720)  Pulse: 92 (05/08/19 0720)  Resp: 20 (05/08/19 0720)  BP: (!) 108/45 (05/08/19 0720)  SpO2: 98 % (05/08/19 0720) Vital Signs (24h Range):  Temp:  [98.3 °F (36.8 °C)-99.6 °F (37.6 °C)] 99.6 °F (37.6 °C)  Pulse:  [84-95] 92  Resp:  [20] 20  SpO2:  [98 %-100 %] 98 %  BP: (102-127)/(45-58) 108/45     Weight: (!) 149.2 kg (329 lb)  Body mass index is 53.1 kg/m².    Intake/Output Summary (Last 24 hours) at 5/8/2019 1119  Last data filed at 5/8/2019 0500  Gross per 24 hour   Intake 50 ml   Output 4200 ml   Net -4150 ml      Physical Exam   Constitutional: She is oriented to person, place, and time. She appears well-developed and well-nourished.   Tearful     HENT:   Head: Normocephalic and atraumatic.   Eyes: EOM are normal.   Neck: Normal range of motion. Neck supple.   Cardiovascular: Normal rate, regular rhythm and normal heart sounds.   No murmur heard.  Pulmonary/Chest: Effort normal and breath sounds normal. No  respiratory distress.   Communicative dyspnea improving     Abdominal: Soft. Bowel sounds are normal. She exhibits no distension. There is no tenderness.   Protuberant     Musculoskeletal: Normal range of motion. She exhibits no edema.   Neurological: She is alert and oriented to person, place, and time.   Skin: Skin is warm and dry.   Venous stasis changes without signs of infection.    Right protruding vascular breast mass. Left palpable breast mass.    Psychiatric: Her mood appears anxious.     Significant Labs:   CBC:   Recent Labs   Lab 05/07/19  1715 05/08/19  0542   WBC 30.26* 20.15*   HGB 9.0* 8.1*   HCT 28.3* 25.3*    256     CMP:   Recent Labs   Lab 05/07/19  1715 05/08/19  0542   * 134*   K 4.2 3.6    102   CO2 24 23   * 146*   BUN 17 15   CREATININE 0.9 0.7   CALCIUM 8.9 8.8   PROT 6.3  --    ALBUMIN 2.4*  --    BILITOT 0.6  --    ALKPHOS 90  --    AST 27  --    ALT 14  --    ANIONGAP 7* 9   EGFRNONAA >60 >60       Significant Imaging: I have reviewed all pertinent imaging results/findings within the past 24 hours.

## 2019-05-08 NOTE — SUBJECTIVE & OBJECTIVE
No current facility-administered medications on file prior to encounter.      No current outpatient medications on file prior to encounter.       Review of patient's allergies indicates:  No Known Allergies    Past Medical History:   Diagnosis Date    Encounter for blood transfusion     Morbid obesity 5/7/2019    Venous stasis dermatitis of both lower extremities 5/7/2019     History reviewed. No pertinent surgical history.  Family History     None        Tobacco Use    Smoking status: Current Every Day Smoker     Packs/day: 1.00   Substance and Sexual Activity    Alcohol use: Not Currently    Drug use: Never    Sexual activity: Not Currently     Review of Systems   Constitutional: Negative for appetite change, chills, fatigue, fever and unexpected weight change.   HENT: Negative for hearing loss and rhinorrhea.    Eyes: Negative for visual disturbance.   Respiratory: Positive for shortness of breath. Negative for apnea, cough and wheezing.    Cardiovascular: Negative for chest pain and palpitations.   Gastrointestinal: Negative for abdominal distention, abdominal pain, blood in stool, constipation, diarrhea, nausea and vomiting.   Genitourinary: Negative for dysuria, frequency and urgency.   Musculoskeletal: Negative for arthralgias and neck pain.   Skin: Negative for rash.        Venous stasis changes of the lower legs bilaterally   Neurological: Negative for seizures, weakness, numbness and headaches.   Hematological: Negative for adenopathy. Does not bruise/bleed easily.   Psychiatric/Behavioral: Negative for hallucinations. The patient is not nervous/anxious.      Objective:     Vital Signs (Most Recent):  Temp: 98.7 °F (37.1 °C) (05/08/19 1513)  Pulse: 83 (05/08/19 1513)  Resp: 18 (05/08/19 1513)  BP: (!) 110/53 (05/08/19 1513)  SpO2: 98 % (05/08/19 1513) Vital Signs (24h Range):  Temp:  [97.7 °F (36.5 °C)-99.6 °F (37.6 °C)] 98.7 °F (37.1 °C)  Pulse:  [83-94] 83  Resp:  [18-20] 18  SpO2:  [98 %-100 %]  98 %  BP: (102-129)/(45-60) 110/53     Weight: (!) 149.2 kg (329 lb)  Body mass index is 53.1 kg/m².    Physical Exam   Constitutional: She is oriented to person, place, and time. No distress.   Morbidly obese   HENT:   Head: Normocephalic and atraumatic.   Neck: Neck supple.   Cardiovascular: Normal rate, regular rhythm and normal heart sounds.   Pulmonary/Chest: Effort normal and breath sounds normal.   Abdominal: Soft. Bowel sounds are normal.   Morbidly obese   Neurological: She is alert and oriented to person, place, and time.   Skin: Skin is warm.   Venous stasis changes of the lower extremity.    Bilateral breast exam  \  On the right side there is a 5 x 5 fungating mass that bleeds approximately 2 cm above the nipple-areolar complex.  There is a fullness in the central portion of the breast.  There is a mild amount of skin changes.  There is a fullness in the lower aspects of the axilla.    On the left side there is mild skin changes with dimpling.  There is a fullness in the central region of breast.  There is no nipple discharges.  There is no axillary adenopathy   Nursing note and vitals reviewed.      Significant Labs:  CBC:   Recent Labs   Lab 05/08/19  0542   WBC 20.15*   RBC 2.90*   HGB 8.1*   HCT 25.3*      MCV 87   MCH 27.9   MCHC 32.0     BMP:   Recent Labs   Lab 05/08/19  0542   *   *   K 3.6      CO2 23   BUN 15   CREATININE 0.7   CALCIUM 8.8     CMP:   Recent Labs   Lab 05/07/19  1715 05/08/19  0542   * 146*   CALCIUM 8.9 8.8   ALBUMIN 2.4*  --    PROT 6.3  --    * 134*   K 4.2 3.6   CO2 24 23    102   BUN 17 15   CREATININE 0.9 0.7   ALKPHOS 90  --    ALT 14  --    AST 27  --    BILITOT 0.6  --        Significant Diagnostics:  CT: I have reviewed all pertinent results/findings within the past 24 hours and my personal findings are:   CT report is in the media section of the chart.  Concerning for bilateral breast cancer with metastatic disease     CT  of the chest is pending    Reading Physician Reading Date Result Priority   Ricky Alberts MD 5/7/2019       Narrative     EXAMINATION:  CT ABDOMEN PELVIS WITH CONTRAST    CLINICAL HISTORY:  breast mass rule out mets and infectious process;    TECHNIQUE:  Standard axial imaging performed extending from the lung bases through the pubic symphysis, following administration of intravenous contrast.  Additional 2D  reformatted sagittal and coronal images obtained.    COMPARISON:  None    FINDINGS:  Mild atelectasis the lung bases.    The liver is normal. Gallbladder is normal.  Portal vein is patent.    The spleen is normal in size and appearance.  The pancreas is normal.  The adrenal glands are normal.  Atherosclerotic changes of the abdominal aorta with mild ectasia.  No aneurysm.  IVC normal.  No retroperitoneal adenopathy.    Solid enhancing mass is identified along the medial margins of the mid and inferior left kidney, measuring 5.4 cm in maximal dimension.  The lesion is thought to arise from the medial and posterior left renal cortex and is highly suspicious for renal cell carcinoma.  No left renal vein or IVC tumor invasion.  Right ureter is nondilated and normal.    Right kidney and right ureter normal.    The stomach is normal.  The small intestine is normal.  The appendix is normal.  Diverticulosis sigmoid colon.  Negative for diverticulitis.  The rectum is normal.    The pelvis demonstrates collapsed bladder with Noriega catheter in place..    Regional bones demonstrate degenerative changes of the spine.  Old L5 spondylolytic defect noted.  No suspicious osseous lesions.  Abdominal and pelvic wall soft tissues are normal.      Impression       1. 5.4 cm solid enhancing mass left kidney highly suspicious for renal cell carcinoma.  2. No evidence of metastatic disease throughout the abdomen or pelvis.  All CT scans at this facility are performed  using dose modulation techniques as appropriate to  performed exam including the following:  automated exposure control; adjustment of mA and/or kV according to the patients size (this includes techniques or standardized protocols for targeted exams where dose is matched to indication/reason for exam: i.e. extremities or head);  iterative reconstruction technique.      Electronically signed by: Ricky Alberts MD  Date: 05/07/2019  Time: 19:14             Last Resulted: 05/07/19 19:14 Order Details View Encounter Lab and Collection Details Routing Result History            External Result Report     External Result Report   Narrative     EXAMINATION:  CT ABDOMEN PELVIS WITH CONTRAST    CLINICAL HISTORY:  breast mass rule out mets and infectious process;    TECHNIQUE:  Standard axial imaging performed extending from the lung bases through the pubic symphysis, following administration of intravenous contrast.  Additional 2D  reformatted sagittal and coronal images obtained.    COMPARISON:  None    FINDINGS:  Mild atelectasis the lung bases.    The liver is normal. Gallbladder is normal.  Portal vein is patent.    The spleen is normal in size and appearance.  The pancreas is normal.  The adrenal glands are normal.  Atherosclerotic changes of the abdominal aorta with mild ectasia.  No aneurysm.  IVC normal.  No retroperitoneal adenopathy.    Solid enhancing mass is identified along the medial margins of the mid and inferior left kidney, measuring 5.4 cm in maximal dimension.  The lesion is thought to arise from the medial and posterior left renal cortex and is highly suspicious for renal cell carcinoma.  No left renal vein or IVC tumor invasion.  Right ureter is nondilated and normal.    Right kidney and right ureter normal.    The stomach is normal.  The small intestine is normal.  The appendix is normal.  Diverticulosis sigmoid colon.  Negative for diverticulitis.  The rectum is normal.    The pelvis demonstrates collapsed bladder with Noriega catheter in  place..    Regional bones demonstrate degenerative changes of the spine.  Old L5 spondylolytic defect noted.  No suspicious osseous lesions.  Abdominal and pelvic wall soft tissues are normal.   Impression       1. 5.4 cm solid enhancing mass left kidney highly suspicious for renal cell carcinoma.  2. No evidence of metastatic disease throughout the abdomen or pelvis.  All CT scans at this facility are performed  using dose modulation techniques as appropriate to performed exam including the following:  automated exposure control; adjustment of mA and/or kV according to the patients size (this includes techniques or standardized protocols for targeted exams where dose is matched to indication/reason for exam: i.e. extremities or head);  iterative reconstruction technique.      Electronically signed by: Ricky Alberts MD  Date: 05/07/2019  Time: 19:14

## 2019-05-08 NOTE — ASSESSMENT & PLAN NOTE
May 8, 2019  --Outpatient consult to Urology for further workup. No urgent need for inpatient biopsy. Likely slow growing

## 2019-05-08 NOTE — CONSULTS
[facility]  RADIATION ONCOLOGY CONSULTATION    Name: Isadora Colin  : 1960      Patient Referred To Radiation Oncology By:  Dr. Zak Holcomb MD  [address]  [address] [facility] [place] [address]    DIAGNOSIS:  Likely metastatic breast cancer with bleeding right fungating breast mass, right kidney mass and lung masses    HISTORY OF PRESENT ILLNESS:  Isadora Colin is a pleasant 58 y.o. female who has been referred from [facility] in [place], [place] for she was admitted on May 6 with right breast bleeding and a 9 cm right breast mass.  She endorsed 3 weeks of prior right breast bleeding.  She had blood pressure of 79/44, 23,000 white blood cells and hemoglobin 9.3.  CT scan showed a right breast mass measured 8.3 cm, right breast and axillary lymph nodes, right chest wall, right 2nd rib and lung masses.  She was also given IV antibiotics.  Infectious Disease has been consulted as well as general surgery for biopsy.  She also has a 5.4 cm malignant appearing left renal mass.  She tells me the mass will shoot blood out and also loses.  She has a dressing in place currently.  The breast also hurts and she is on narcotics as an inpatient which is helping.    REVIEW OF SYSTEMS: (Positive findings bold, otherwise negative)   Constitutional: fever, fatigue, weight change  Eyes: blurred vision in the past 3 months, double vision   ENT: ear pain, new mouth lesions, jaw pain, difficulty swallowing, sore throat  Cardiovascular: chest pain on exertion, reflux, extremity swelling  Respiratory: shortness of breath, dyspnea, cough, hemoptysis.   GI: abdominal pain, diarrhea, constipation, blood in stool, painful bowel movements  : painful or burning urination, denies blood in urine  Musculoskeletal: new bone or joint pains  Neurologic: headache, seizure, focal numbness or tingling, balance changes, speech changes  Lymph: new or enlarged lymph nodes  Psychiatric: depression, anxiety    PRIOR  RADIATION HISTORY: None    PAST MEDICAL HISTORY:  Past Medical History:   Diagnosis Date    Encounter for blood transfusion     Morbid obesity 5/7/2019    Venous stasis dermatitis of both lower extremities 5/7/2019       PAST SURGICAL HISTORY:  History reviewed. No pertinent surgical history.    ALLERGIES:   Review of patient's allergies indicates:  No Known Allergies    MEDICATIONS:    Current Facility-Administered Medications:     ceFEPIme in dextrose 5% 2 gram/50 mL IVPB 2 g, 2 g, Intravenous, Q12H, Trena Craig NP, Last Rate: 100 mL/hr at 05/08/19 0642, 2 g at 05/08/19 0642    furosemide injection 40 mg, 40 mg, Intravenous, BID, Dreshellia Rafa, NP, 40 mg at 05/08/19 0843    HYDROcodone-acetaminophen 7.5-325 mg per tablet 1 tablet, 1 tablet, Oral, Q6H PRN, Trena Craig NP, 1 tablet at 05/08/19 0642    ramelteon tablet 8 mg, 8 mg, Oral, Nightly PRN, Trena Craig NP, 8 mg at 05/07/19 2225    vancomycin 1.75 g in 5 % dextrose 500 mL IVPB, 1,750 mg, Intravenous, Q12H, Sagar Cloud MD, Last Rate: 250 mL/hr at 05/08/19 0843, 1,750 mg at 05/08/19 0843    SOCIAL HISTORY:  Social History     Socioeconomic History    Marital status: Single     Spouse name: Not on file    Number of children: Not on file    Years of education: Not on file    Highest education level: Not on file   Occupational History    Not on file   Social Needs    Financial resource strain: Not on file    Food insecurity:     Worry: Not on file     Inability: Not on file    Transportation needs:     Medical: Not on file     Non-medical: Not on file   Tobacco Use    Smoking status: Current Every Day Smoker     Packs/day: 1.00   Substance and Sexual Activity    Alcohol use: Not Currently    Drug use: Never    Sexual activity: Not Currently   Lifestyle    Physical activity:     Days per week: Not on file     Minutes per session: Not on file    Stress: Not on file   Relationships    Social connections:      "Talks on phone: Not on file     Gets together: Not on file     Attends Tenriism service: Not on file     Active member of club or organization: Not on file     Attends meetings of clubs or organizations: Not on file     Relationship status: Not on file   Other Topics Concern    Not on file   Social History Narrative    Not on file       FAMILY HISTORY:  History reviewed. No pertinent family history.    PHYSICAL EXAMINATION:  Constitutional: well appearing, no acute distress, ECOG 2 - Ambulates, capable of self care only  Vitals:    /60 (BP Location: Right arm, Patient Position: Lying)   Pulse 83   Temp 97.7 °F (36.5 °C) (Oral)   Resp 18   Ht 5' 6" (1.676 m)   Wt (!) 149.2 kg (329 lb)   SpO2 98%   Breastfeeding? No   BMI 53.10 kg/m²   Neck: trachea midline, neck supple  Lymphatic: no cervical, supraclavicular adenopathy  Breast:  Right breast mass dressing.  The superior portion is removed.  There was an fungating breast mass in the upper central right breast.  The dressing is removed it bleeds.  I replaced the dressing and held a bit of pressure which appear to stop the bleeding.  Cardiovascular: regular rate, no murmurs, no edema of the upper or lower extremities, radial pulse 2+  Respiratory: unlabored effort, clear to auscultation, no wheezes  Abdomen: soft, non-tender, no rigidity, no masses, no hepatomegaly    IMAGING AND LABORATORY FINDINGS: As per HPI; images reviewed personally.    ASSESSMENT:  Likely metastatic breast cancer with bleeding right breast mass    PLAN:  We discussed the likely diagnosis.  The outside CT shows rib and lung masses and also a renal mass by CT at [facility].  The renal mass may be a 2nd primary tumor.  Biopsy has not been performed but general surgery has been consulted.  Radiation may be able to control her bleeding.  She lives [place] of [place] near [place] and there is a radiation oncologist in [place], however she needs control of the bleeding prior " to being discharged.  She will likely be an inpatient for least a few days to stabilize the bleeding and get a biopsy.  I do not see any General surgery nodes yet.  It is possible she will be offered mastectomy.  If not she will require radiation to control the bleeding.  We can start radiation in [place] and once she is discharged radiation can be resumed in [place] and I can contact the Radiation oncologist there.  I discussed the case with Medical Oncology, Blaire Arguello.  I have ordered a CT chest to access for metastases since I don't have the outside images to review.  If she does not seem to have metastases and the only abnormality other than the right breast and lymph nodes is the left kidney, she may have 2 separate primaries and could be treated with curative intent therapy in which case instead of radiation we could proceed with right breast mastectomy which will control the bleeding.  She may require postmastectomy radiation but this could be given closer to her home.   I will coordinate with Dr. Murphy with whom the patient has met.  I have tentatively placed her on the radiation schedule for radiation planning tomorrow.    We discussed the techniques, toxicities and indications of radiation and I answered the patient's questions to their apparent satisfaction.    MIRIAM Cunha M.D.  Radiation Oncology  [facility] [address] [place] [address]  Ph: [phone]  [email or web address]

## 2019-05-08 NOTE — PLAN OF CARE
Problem: Adult Inpatient Plan of Care  Goal: Plan of Care Review  Outcome: Ongoing (interventions implemented as appropriate)  POC reviewed, including indications and possible side effects of administered medications. Patient verbalized understanding and teach back. No adverse reactions noted. Patient c/o breast pain. Administered medications per order. Noriega catheter and wound care performed. No s/s of acute distress noted. Patient remains free of falls and injuries during shift. Daughter at bedside. Will continue to monitor.    Chart check complete.

## 2019-05-08 NOTE — PLAN OF CARE
Problem: Adult Inpatient Plan of Care  Goal: Plan of Care Review  Outcome: Ongoing (interventions implemented as appropriate)  Remains free from injury, Right breast dressing dry and intact, IV ABX continue, pain controlled with oral prn medication, family remains at bedside, 12 hour chart check reviewed, will continue to monitor

## 2019-05-08 NOTE — CONSULTS
Consulted on this 59 y/o F patient due to left breast and BLE wounds. Patient was transferred from [facility] due to a superficial tumor to right breast. ABD pad dressing currently intact. She states the surgeon came to see it this morning and re-dressed it at that time. She states he is coming back this afternoon with medical students to see the wound and requests not to remove dressing at this time. Her wishes are honored. Picture from medical record reviewed. Recommend when redressed, to cover wound with adaptic contact layer, then top with ABD pad, supplies left at bedside, and discussed with patient, as she is a retired nurse. BLE then assessed. Skin changes noted consistent with chronic venous stasis dermatitis including hemosiderin staining and dry flaky skin. No ulcers noted, however there is excoriation to Right shin, patient c/o urticaria and admits to scratching that area. sween cream applied to BLE and left at bedside. No other wound care needs. Will follow prn.

## 2019-05-08 NOTE — HPI
58 y.o female history of morbid obesity, peripheral edema (on Lasix at home),chronic venous stasis dermatitis who presented to [facility] on May 6, 2018 for further evaluation of bleeding right breast mass which patient reports onset approximately 4 weeks ago and has progressively gotten larger.  Associated symptoms include right breast pain.  Patient denies any other symptoms. She denies CP, SOB, bowel or urinary complaints, fever, abdominal pain, chills, or weight loss.     At [facility] in ED, she was noted hypotensive (79 systolic) without tachycardia which responded to IVF's. Labs reflected lactic acidosis, leukocytosis(23k), and normocytic anemia Hg 9.3. Lactic acidosis resolved after IVF's.  CT Chest revealed 8.3 cm right breast mass, 4.7 cm left breast mass, mass involving right chest wall and rib, and surrounding lymph node involvement. She was treated empirically with Zosyn and Vancomycin IV abx. Imaging negative for infectious process.     Patient was transferred to [facility] for higher level care. She has received 2 units of blood. She has never had a mammogram or colonoscopy. Does not have regular PCP.     CT Abdomen/Pelvis done at [facility] revealed 5.4 cm renal mass. ID consulted for elevated WBC. General surgery consulted for mass biopsy. General surgery awaiting r/o sepsis to proceed with biopsy

## 2019-05-08 NOTE — CONSULTS
[facility] -   General Surgery  Consult Note    Patient Name: Isadora Colin  MRN: [id number]  Code Status: No Order  Admission Date: 5/7/2019  Hospital Length of Stay: 1 days  Attending Physician: Sagar Cloud, *  Primary Care Provider: JOLENE PHIPPS III, MD    Patient information was obtained from patient and ER records.     Inpatient consult to General Surgery  Consult performed by: Rufus Murphy MD  Consult ordered by: Trena Craig NP  Reason for consult: Breast mass, bilateral breast masses and other findings concerning for metastatic breast cancer  Assessment/Recommendations: The patient needs bilateral mammograms ultrasounds and biopsies of any abnormalities seen.  I would recommend that this be done through a minimally invasive are image guided approach through normal skin asked opposed to biopsying the mass.    Unfortunately in our facility were not able to perform diagnostic mammography, breast ultrasound or breast biopsies.    This was discussed with the patient. Please see the full consult note        Subjective:     Principal Problem: Breast mass, right    History of Present Illness: The patient was accepted in transfer from the [facility] for a fungating bleeding right breast mass that was suspicious for breast cancer.  She underwent a CT scan of her chest that showed a right and left breast masses, right axillary lymph nodes and likely metastatic disease.  She was admitted by the medical service and surgery Oncology consult were obtained.  The patient had a CT scan of her abdomen that showed a renal mass.    The patient states she noticed a breast mass about 4 weeks ago.  She says it bleeds if it rubs on her clothes.  It is not associated with a significant pain.    No current facility-administered medications on file prior to encounter.      No current outpatient medications on file prior to encounter.       Review of patient's allergies indicates:  No Known  Allergies    Past Medical History:   Diagnosis Date    Encounter for blood transfusion     Morbid obesity 5/7/2019    Venous stasis dermatitis of both lower extremities 5/7/2019     History reviewed. No pertinent surgical history.  Family History     None        Tobacco Use    Smoking status: Current Every Day Smoker     Packs/day: 1.00   Substance and Sexual Activity    Alcohol use: Not Currently    Drug use: Never    Sexual activity: Not Currently     Review of Systems   Constitutional: Negative for appetite change, chills, fatigue, fever and unexpected weight change.   HENT: Negative for hearing loss and rhinorrhea.    Eyes: Negative for visual disturbance.   Respiratory: Positive for shortness of breath. Negative for apnea, cough and wheezing.    Cardiovascular: Negative for chest pain and palpitations.   Gastrointestinal: Negative for abdominal distention, abdominal pain, blood in stool, constipation, diarrhea, nausea and vomiting.   Genitourinary: Negative for dysuria, frequency and urgency.   Musculoskeletal: Negative for arthralgias and neck pain.   Skin: Negative for rash.        Venous stasis changes of the lower legs bilaterally   Neurological: Negative for seizures, weakness, numbness and headaches.   Hematological: Negative for adenopathy. Does not bruise/bleed easily.   Psychiatric/Behavioral: Negative for hallucinations. The patient is not nervous/anxious.      Objective:     Vital Signs (Most Recent):  Temp: 98.7 °F (37.1 °C) (05/08/19 1513)  Pulse: 83 (05/08/19 1513)  Resp: 18 (05/08/19 1513)  BP: (!) 110/53 (05/08/19 1513)  SpO2: 98 % (05/08/19 1513) Vital Signs (24h Range):  Temp:  [97.7 °F (36.5 °C)-99.6 °F (37.6 °C)] 98.7 °F (37.1 °C)  Pulse:  [83-94] 83  Resp:  [18-20] 18  SpO2:  [98 %-100 %] 98 %  BP: (102-129)/(45-60) 110/53     Weight: (!) 149.2 kg (329 lb)  Body mass index is 53.1 kg/m².    Physical Exam   Constitutional: She is oriented to person, place, and time. No distress.    Morbidly obese   HENT:   Head: Normocephalic and atraumatic.   Neck: Neck supple.   Cardiovascular: Normal rate, regular rhythm and normal heart sounds.   Pulmonary/Chest: Effort normal and breath sounds normal.   Abdominal: Soft. Bowel sounds are normal.   Morbidly obese   Neurological: She is alert and oriented to person, place, and time.   Skin: Skin is warm.   Venous stasis changes of the lower extremity.    Bilateral breast exam  \  On the right side there is a 5 x 5 fungating mass that bleeds approximately 2 cm above the nipple-areolar complex.  There is a fullness in the central portion of the breast.  There is a mild amount of skin changes.  There is a fullness in the lower aspects of the axilla.    On the left side there is mild skin changes with dimpling.  There is a fullness in the central region of breast.  There is no nipple discharges.  There is no axillary adenopathy   Nursing note and vitals reviewed.      Significant Labs:  CBC:   Recent Labs   Lab 05/08/19  0542   WBC 20.15*   RBC 2.90*   HGB 8.1*   HCT 25.3*      MCV 87   MCH 27.9   MCHC 32.0     BMP:   Recent Labs   Lab 05/08/19  0542   *   *   K 3.6      CO2 23   BUN 15   CREATININE 0.7   CALCIUM 8.8     CMP:   Recent Labs   Lab 05/07/19  1715 05/08/19  0542   * 146*   CALCIUM 8.9 8.8   ALBUMIN 2.4*  --    PROT 6.3  --    * 134*   K 4.2 3.6   CO2 24 23    102   BUN 17 15   CREATININE 0.9 0.7   ALKPHOS 90  --    ALT 14  --    AST 27  --    BILITOT 0.6  --        Significant Diagnostics:  CT: I have reviewed all pertinent results/findings within the past 24 hours and my personal findings are:   CT report is in the media section of the chart.  Concerning for bilateral breast cancer with metastatic disease     CT of the chest is pending    Reading Physician Reading Date Result Priority   Ricky Alberts MD 5/7/2019       Narrative     EXAMINATION:  CT ABDOMEN PELVIS WITH CONTRAST    CLINICAL  HISTORY:  breast mass rule out mets and infectious process;    TECHNIQUE:  Standard axial imaging performed extending from the lung bases through the pubic symphysis, following administration of intravenous contrast.  Additional 2D  reformatted sagittal and coronal images obtained.    COMPARISON:  None    FINDINGS:  Mild atelectasis the lung bases.    The liver is normal. Gallbladder is normal.  Portal vein is patent.    The spleen is normal in size and appearance.  The pancreas is normal.  The adrenal glands are normal.  Atherosclerotic changes of the abdominal aorta with mild ectasia.  No aneurysm.  IVC normal.  No retroperitoneal adenopathy.    Solid enhancing mass is identified along the medial margins of the mid and inferior left kidney, measuring 5.4 cm in maximal dimension.  The lesion is thought to arise from the medial and posterior left renal cortex and is highly suspicious for renal cell carcinoma.  No left renal vein or IVC tumor invasion.  Right ureter is nondilated and normal.    Right kidney and right ureter normal.    The stomach is normal.  The small intestine is normal.  The appendix is normal.  Diverticulosis sigmoid colon.  Negative for diverticulitis.  The rectum is normal.    The pelvis demonstrates collapsed bladder with Noriega catheter in place..    Regional bones demonstrate degenerative changes of the spine.  Old L5 spondylolytic defect noted.  No suspicious osseous lesions.  Abdominal and pelvic wall soft tissues are normal.      Impression       1. 5.4 cm solid enhancing mass left kidney highly suspicious for renal cell carcinoma.  2. No evidence of metastatic disease throughout the abdomen or pelvis.  All CT scans at this facility are performed  using dose modulation techniques as appropriate to performed exam including the following:  automated exposure control; adjustment of mA and/or kV according to the patients size (this includes techniques or standardized protocols for targeted  exams where dose is matched to indication/reason for exam: i.e. extremities or head);  iterative reconstruction technique.      Electronically signed by: Ricky Alberts MD  Date: 05/07/2019  Time: 19:14             Last Resulted: 05/07/19 19:14 Order Details View Encounter Lab and Collection Details Routing Result History            External Result Report     External Result Report   Narrative     EXAMINATION:  CT ABDOMEN PELVIS WITH CONTRAST    CLINICAL HISTORY:  breast mass rule out mets and infectious process;    TECHNIQUE:  Standard axial imaging performed extending from the lung bases through the pubic symphysis, following administration of intravenous contrast.  Additional 2D  reformatted sagittal and coronal images obtained.    COMPARISON:  None    FINDINGS:  Mild atelectasis the lung bases.    The liver is normal. Gallbladder is normal.  Portal vein is patent.    The spleen is normal in size and appearance.  The pancreas is normal.  The adrenal glands are normal.  Atherosclerotic changes of the abdominal aorta with mild ectasia.  No aneurysm.  IVC normal.  No retroperitoneal adenopathy.    Solid enhancing mass is identified along the medial margins of the mid and inferior left kidney, measuring 5.4 cm in maximal dimension.  The lesion is thought to arise from the medial and posterior left renal cortex and is highly suspicious for renal cell carcinoma.  No left renal vein or IVC tumor invasion.  Right ureter is nondilated and normal.    Right kidney and right ureter normal.    The stomach is normal.  The small intestine is normal.  The appendix is normal.  Diverticulosis sigmoid colon.  Negative for diverticulitis.  The rectum is normal.    The pelvis demonstrates collapsed bladder with Noriega catheter in place..    Regional bones demonstrate degenerative changes of the spine.  Old L5 spondylolytic defect noted.  No suspicious osseous lesions.  Abdominal and pelvic wall soft tissues are normal.    Impression       1. 5.4 cm solid enhancing mass left kidney highly suspicious for renal cell carcinoma.  2. No evidence of metastatic disease throughout the abdomen or pelvis.  All CT scans at this facility are performed  using dose modulation techniques as appropriate to performed exam including the following:  automated exposure control; adjustment of mA and/or kV according to the patients size (this includes techniques or standardized protocols for targeted exams where dose is matched to indication/reason for exam: i.e. extremities or head);  iterative reconstruction technique.      Electronically signed by: Ricky Alberts MD  Date: 05/07/2019  Time: 19:14            Assessment/Plan:     * Breast mass, right  Highly concerning for breast cancer.  There is also axillary adenopathy.    The patient needs a diagnostic right mammogram right ultrasound and biopsy of the right axillary lymph nodes.  Unfortunately these procedures cannot be done at this facility    Left renal mass  Further management and workup per Oncology/hospital Medicine    Venous stasis dermatitis of both lower extremities  Management per Medicine    Morbid obesity  This should be addressed with her primary care doctor after discharge    Leukocytosis  Likely secondary to the malignancy      VTE Risk Mitigation (From admission, onward)    None        In short the patient needs bilateral diagnostic mammograms and ultrasounds.  Bilateral axillary ultrasounds and biopsies of all suspicious lesions.    These procedures cannot be done in this facility due to regulations.      The options include obtaining diagnostic mammogram and ultrasounds at the [facility] outpatient clinic tomorrow.  A biopsy of all abnormalities could then be arranged to be done in the future at the clinic at the gross    Transfer on a day pass to the growth clinic where diagnostic imaging and biopsies could be done at 1 setting if the radiology department there was able to  accommodate this      Consider transferred to [facility] if they are capable of doing an inpatient breast cancer workup that would require mammograms ultrasounds and biopsies      The patient explained to me that she really has no where to stay in [place]    Arrangements can be made for them to be done as an outpatient      Thank you for your consult. I will follow-up with patient. Please contact us if you have any additional questions.    Rufus Murphy MD  General Surgery  [facility]

## 2019-05-08 NOTE — H&P
"[facility] Hospital Medicine  History & Physical    Patient Name: Isadora Colin  MRN: [id number]  Admission Date: 5/7/2019   Attending Physician: Sagar Cloud, *   Primary Care Provider: JOLENE PHIPPS III, MD    Patient information was obtained from patient and ER records.     Subjective:     Principal Problem:Breast mass, right    Chief Complaint: No chief complaint on file.       HPI: Isadora Colin is a 58 year old female with history of morbid obesity, peripheral edema,chronic venous stasis dermatitis who presented to [facility] on May 6, 2018 for further evaluation of bleeding right breast mass. She recalls noticing the breast mass around 4 weeks ago or "so". This was never evaluated by a physician. She denies fever, abdominal pain, chills, or weight loss. In ED, she was noted hypotensive (79 systolic) without tachycardia which responded to IVF's. Labs reflected lactic acidosis, leukocytosis(23k), and normocytic anemia. Lactic acidosis resolved after IVF's.  CT Chest revealed 8,3 cm right breast mass, 4.7 cm left breast mass, mass involving right chest wall and rib, and surrounding lymph node involvement. She was treated empirically with Zosyn and Vancomycin empirically. Imaging negative for infectious process. [facility] contacted for transfer for further sepsis work up and Oncology consult. She had not further bleeding from protruding breast mass. She has received 2 units of blood. She has never had a mammogram or colonoscopy. She takes Lasix for swelling, but denies having an Echocardiogram.     Past Medical History:   Diagnosis Date    Morbid obesity 5/7/2019    Venous stasis dermatitis of both lower extremities 5/7/2019       No past surgical history on file.    Review of patient's allergies indicates:  No Known Allergies    No current facility-administered medications on file prior to encounter.      No current outpatient medications on file prior " to encounter.     Family History     None        Tobacco Use    Smoking status: Not on file   Substance and Sexual Activity    Alcohol use: Not on file    Drug use: Not on file    Sexual activity: Not on file     Review of Systems   Constitutional: Negative for activity change, diaphoresis, fatigue and unexpected weight change.   HENT: Negative for congestion, ear pain and sore throat.    Eyes: Negative.    Respiratory: Negative for shortness of breath and wheezing.    Cardiovascular: Positive for leg swelling. Negative for chest pain and palpitations.   Gastrointestinal: Negative for abdominal pain, constipation, diarrhea and vomiting.   Endocrine: Negative.    Genitourinary: Negative for flank pain, hematuria and urgency.   Musculoskeletal: Negative for joint swelling and neck pain.   Skin: Negative for pallor.        Bleeding right breast mass     Neurological: Negative for seizures, syncope and light-headedness.   Hematological: Negative.    Psychiatric/Behavioral: Negative.       Objective:     Vital Signs (Most Recent):    Vital Signs (24h Range):  Temp:  [97.4 °F (36.3 °C)-98.2 °F (36.8 °C)] 97.4 °F (36.3 °C)  Pulse:  [82-97] 97  Resp:  [18-20] 20  SpO2:  [96 %-100 %] 100 %  BP: (124-146)/(72-76) 146/72        There is no height or weight on file to calculate BMI.    Physical Exam   Constitutional: She is oriented to person, place, and time. She appears well-developed and well-nourished.   HENT:   Head: Normocephalic and atraumatic.   Eyes: EOM are normal.   Neck: Normal range of motion. Neck supple.   Cardiovascular: Normal rate, regular rhythm and normal heart sounds.   No murmur heard.  Pulmonary/Chest: Effort normal and breath sounds normal. No respiratory distress.   Communicative dyspnea  Audible crackles noted   Abdominal: Soft. Bowel sounds are normal. She exhibits no distension. There is no tenderness.   Protuberant     Musculoskeletal: Normal range of motion. She exhibits no edema.    Neurological: She is alert and oriented to person, place, and time.   Skin: Skin is warm and dry.   Venous stasis changes without signs of infection.    Right protruding vascular breast mass. Left palpable breast mass.    Psychiatric: Her mood appears anxious.         CRANIAL NERVES     CN III, IV, VI   Extraocular motions are normal.           Significant Labs: repeat labs are pending    Significant Imaging:               Assessment/Plan:     * Breast mass, right  --concerning for malignancy. CT at previous facility showed mass involving bilateral breast, lung, and bone. Will order CT abdomen Pelvis for further evaluation  --Oncology consulted  --General Surgery consulted for tissue biopsy        Acute blood loss anemia  --normocytic anemia s/p 2 units PRBC's   --could be related to malignancy  --has never had colonoscopy      Venous stasis dermatitis of both lower extremities  --No signs of infection  --daily skin care    Morbid obesity  Counseled on need for weight loss      Leukocytosis  -infectious etiology not found  --reactive vs ?sepsis which is unlikely  --CT Chest negative for infectious process. CT abdomen for further evaluation  --empiric Zosyn and Vancomycin      VTE Risk Mitigation (From admission, onward)    None        Trena Craig NP  Department of Hospital Medicine   [facility]

## 2019-05-08 NOTE — ASSESSMENT & PLAN NOTE
--normocytic anemia s/p 2 units PRBC's   --could be related to malignancy  --has never had colonoscopy    5/8/19  Hemoglobin 8.1. Down from 9. Closely monitor

## 2019-05-08 NOTE — ASSESSMENT & PLAN NOTE
May 8, 2019  --General surgery consult for breast mass biopsy. Gen surg would like to r/o sepsis prior to proceeding  --I spoke with Dr. Cunha, Rad/Onc who agree to see patient to determine possible radiation options  --CT Chest/Abdomen/Pelvis concerning for metastatic disease. Likely more than one primary. We will need biopsy results and further staging to determine treatment recommendations  --hemoglobin 8.1. No urgent need for additional blood transfusion. Consider PRBCs transfusion for hg < 8. WBC 20.1, likely reactive. No clear infectious source identified. ID consulted. Await ID recs  --BMP unremarkable  --Continue supportive care

## 2019-05-08 NOTE — PLAN OF CARE
Pt admitted from [facility] after she presented with bleeding from the right breast.  Subsequently a mass was found and pt was transferred to [facility] for further workup and a biopsy to confirm the malignancy.  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  The pt has a bedside commode and standard walker at home but reports she rarely has to use them as she is still mostly independent with ADLs.  Pt source of income is [employer] which she receives monthly.  At this point in pt care, the pt is unsure what her dc needs will be if any.  CM provided a transitional care folder, information on advanced directives, information on pharmacy bedside delivery, and discharge planning begins on admission with contact information for any needs/questions.    D/C plan: to be determined       05/08/19 1058   Discharge Assessment   Assessment Type Discharge Planning Assessment   Confirmed/corrected address and phone number on facesheet? Yes   Assessment information obtained from? Patient;Caregiver;Medical Record   Prior to hospitilization cognitive status: Alert/Oriented   Prior to hospitalization functional status: Assistive Equipment   Current cognitive status: Alert/Oriented   Current Functional Status: Assistive Equipment   Facility Arrived From: home    Lives With parent(s)   Able to Return to Prior Arrangements yes   Is patient able to care for self after discharge? Yes   Who are your caregiver(s) and their phone number(s)? Anahi Colin, daughter: [phone]; Angela Fischer, sister: [phone]   Patient's perception of discharge disposition other (comments)  (unable to determine at this time)   Readmission Within the Last 30 Days no previous admission in last 30 days   Patient currently being followed by outpatient case management? No   Patient currently receives any other outside agency services? No   Equipment Currently Used at Home bedside commode;walker,  standard   Do you have any problems affording any of your prescribed medications? TBD   Is the patient taking medications as prescribed? yes   Does the patient have transportation home? Yes   Transportation Anticipated family or friend will provide   Does the patient receive services at the Coumadin Clinic? No   Discharge Plan A Other  (unable to determine at this time )   DME Needed Upon Discharge  none   Patient/Family in Agreement with Plan yes

## 2019-05-08 NOTE — ASSESSMENT & PLAN NOTE
--concerning for malignancy. CT at previous facility showed mass involving bilateral breast, lung, and bone. Will order CT abdomen Pelvis for further evaluation  --Oncology consulted  --General Surgery consulted for tissue biopsy    5/8/19 plans to perform biopsy once sepsis rule out or treatment

## 2019-05-08 NOTE — ASSESSMENT & PLAN NOTE
Highly concerning for breast cancer.  There is also axillary adenopathy.    The patient needs a diagnostic right mammogram right ultrasound and biopsy of the right axillary lymph nodes.  Unfortunately these procedures cannot be done at this facility

## 2019-05-08 NOTE — CONSULTS
Pharmacokinetic Initial Assessment: IV Vancomycin    Assessment/Plan:  Initiate intravenous vancomycin with a loading dose of 3000 mg once followed by a maintenance dose of vancomycin 1750 mg IV every 12 hours (chose slightly lower maintenance dose vs 2gm q12h since this is likely not infectious per NP notes, pt received IV contrast tonight, & has no maintenance fluids running)      Desired empiric serum trough concentration is 10 to 20 mcg/mL.  Draw vancomycin trough level 30 min prior to fourth dose on 05/09 at approximately 0700     Pharmacy will continue to follow and monitor vancomycin.      Please contact pharmacy at extension [phone] with any questions regarding this assessment.     Thank you for the consult,   Katherine McArdle, Pharm.D. 5/7/2019 8:27 PM       Patient brief summary:  Isadora Colin is a 58 y.o. female initiated on antimicrobial therapy with IV vancomycin for treatment of possible sepsis vs reactive leukocytosis (CT chest negative for infectious process)     Drug Allergies:   Review of patient's allergies indicates:  No Known Allergies    Actual Body Weight:   149.2 kg    Renal Function:   Estimated Creatinine Clearance: 102.5 mL/min (based on SCr of 0.9 mg/dL).    CBC (last 72 hours):  Recent Labs   Lab Result Units 05/07/19  1715   WBC K/uL 30.26*   Hemoglobin g/dL 9.0*   Hematocrit % 28.3*   Platelets K/uL 349   Gran% % 91.7*   Lymph% % 3.9*   Mono% % 4.7   Eosinophil% % 0.1   Basophil% % 0.1   Differential Method  Automated       Metabolic Panel (last 72 hours):  Recent Labs   Lab Result Units 05/07/19  1715   Sodium mmol/L 132*   Potassium mmol/L 4.2   Chloride mmol/L 101   CO2 mmol/L 24   Glucose mg/dL 141*   BUN, Bld mg/dL 17   Creatinine mg/dL 0.9   Albumin g/dL 2.4*   Total Bilirubin mg/dL 0.6   Alkaline Phosphatase U/L 90   AST U/L 27   ALT U/L 14       Microbiologic Results:  Microbiology Results (last 7 days)       ** No results found for the last 168 hours. **

## 2019-05-08 NOTE — HOSPITAL COURSE
Isadora Canas is a 58 year old female who was admitted to [facility] for further evaluation of right breast mass and leukocytosis. CT at previous facility shows 8 cm right breast mass, 4 cm left breast mass, and lung/bone/lymph node involvement suspicious for metastatic disease. Oncology consulted who recommended general Surgery consult for biopsy of breast mass. General Surgery will perform biopsy once she is clinically stable and sepsis rule out or treated. CT abdomen Pelvis now shows 5.4 cm renal mass. Will need outpatient biopsy for this. For leukocytosis, patient was empirically treated with IV Vancomycin and Zosyn. CT chest did not suggest infection. Repeat labs 30k leukocytosis and now down to 20k. She will continue Cefepime and Vancomycin empirically. Infectious Disease consulted. 5/9 - pt took leave and had mammogram and U/S and has returned. WBC normalized and ID suspected leukocytosis was of inflammatory origin. Antibiotics discontinued. Had markings for radiation and will have initial radiation treatments to right breast to prevent further bleeding. Right axillary lymph node biopsy pending. Oncology will arrange follow up appointments in [facility] as pt will discharge home and continue treatments/diagnostic work up there. 5/10 - Radiation treatments in progress for breast mass. Pt will have renal mass biopsy by IR on Monday 5/13 and on 5/14 will have breast/lymph node biopsy at clinic then discharge to home. 5/14/19-Patient underwent 3rd radiation treatment, tolerated well. Bilateral breast/lymph node biopsy at [facility] today. 5/15/19- Patient had 4th dose of radiation to right breast today. Daughter and patient request to be discharged today and understand that she will not have the previously scheduled 5th dose radiation treatment due to discharge.  Daughter is to call and schedule an appointment with oncologist at [facility] in [place], [place] per patient's  request.  She is to follow up for results of breast and kidney biopsy next Friday with her outpatient oncologist. Case discussed with Dr. Price. Patient seen and examined on the date of discharge and found suitable for discharge.

## 2019-05-08 NOTE — PROGRESS NOTES
"[facility] Hospital Medicine  Progress Note    Patient Name: Isadora Colin  MRN: [id number]  Patient Class: IP- Inpatient   Admission Date: 5/7/2019  Length of Stay: 1 days  Attending Physician: Sagar Cloud, *  Primary Care Provider: JOLENE PHIPPS III, MD    Subjective:     Principal Problem:Breast mass, right    HPI:  Isadora Colin is a 58 year old female with history of morbid obesity, peripheral edema,chronic venous stasis dermatitis who presented to [facility] on May 6, 2018 for further evaluation of bleeding right breast mass. She recalls noticing the breast mass around 4 weeks ago or "so". This was never evaluated by a physician. She denies fever, abdominal pain, chills, or weight loss. In ED, she was noted hypotensive (79 systolic) without tachycardia which responded to IVF's. Labs reflected lactic acidosis, leukocytosis(23k), and normocytic anemia. Lactic acidosis resolved after IVF's.  CT Chest revealed 8,3 cm right breast mass, 4.7 cm left breast mass, mass involving right chest wall and rib, and surrounding lymph node involvement. She was treated empirically with Zosyn and Vancomycin empirically. Imaging negative for infectious process. [facility] contacted for transfer for further sepsis work up and Oncology consult. She had not further bleeding from protruding breast mass. She has received 2 units of blood. She has never had a mammogram or colonoscopy. She takes Lasix for swelling, but denies having an Echocardiogram.                     Hospital Course:  Isadora Canas is a 58 year old female who was admitted to [facility] for further evaluation of right breast mass and leukocytosis. CT at previous facility shows 8 cm right breast mass, 4 cm left breast mass, and lung/bone/lymph node involvement suspicious for metastatic disease. Oncology consulted who recommended general Surgery consult for biopsy of breast mass. General Surgery " will perform biopsy once she is clinically stable and sepsis rule out or treated. CT abdomen Pelvis now shows 5.4 cm renal mass. Will need outpatient biopsy for this. For leukocytosis, patient was empirically treated with IV Vancomycin and Zosyn. CT chest did not suggest infection. Repeat labs 30k leukocytosis and now down to 20k. She will continue Cefepime and Vancomycin empirically. Infectious Disease consulted.        Interval History: patient is overwhelmed with everything. She states that she may have to go back home for treatment and care. Updated on plan of care.    Review of Systems   Constitutional: Negative for activity change, diaphoresis, fatigue and unexpected weight change.   HENT: Negative for congestion, ear pain and sore throat.    Eyes: Negative.    Respiratory: Negative for shortness of breath and wheezing.    Cardiovascular: Positive for leg swelling. Negative for chest pain and palpitations.   Gastrointestinal: Negative for abdominal pain, constipation, diarrhea and vomiting.   Endocrine: Negative.    Genitourinary: Negative for flank pain, hematuria and urgency.   Musculoskeletal: Negative for joint swelling and neck pain.   Skin: Negative for pallor.        Bleeding right breast mass     Neurological: Negative for seizures, syncope and light-headedness.   Hematological: Negative.    Psychiatric/Behavioral: Negative.       Objective:     Vital Signs (Most Recent):  Temp: 99.6 °F (37.6 °C) (05/08/19 0720)  Pulse: 92 (05/08/19 0720)  Resp: 20 (05/08/19 0720)  BP: (!) 108/45 (05/08/19 0720)  SpO2: 98 % (05/08/19 0720) Vital Signs (24h Range):  Temp:  [98.3 °F (36.8 °C)-99.6 °F (37.6 °C)] 99.6 °F (37.6 °C)  Pulse:  [84-95] 92  Resp:  [20] 20  SpO2:  [98 %-100 %] 98 %  BP: (102-127)/(45-58) 108/45     Weight: (!) 149.2 kg (329 lb)  Body mass index is 53.1 kg/m².    Intake/Output Summary (Last 24 hours) at 5/8/2019 1119  Last data filed at 5/8/2019 0500  Gross per 24 hour   Intake 50 ml   Output 4200  ml   Net -4150 ml      Physical Exam   Constitutional: She is oriented to person, place, and time. She appears well-developed and well-nourished.   Tearful     HENT:   Head: Normocephalic and atraumatic.   Eyes: EOM are normal.   Neck: Normal range of motion. Neck supple.   Cardiovascular: Normal rate, regular rhythm and normal heart sounds.   No murmur heard.  Pulmonary/Chest: Effort normal and breath sounds normal. No respiratory distress.   Communicative dyspnea improving     Abdominal: Soft. Bowel sounds are normal. She exhibits no distension. There is no tenderness.   Protuberant     Musculoskeletal: Normal range of motion. She exhibits no edema.   Neurological: She is alert and oriented to person, place, and time.   Skin: Skin is warm and dry.   Venous stasis changes without signs of infection.    Right protruding vascular breast mass. Left palpable breast mass.    Psychiatric: Her mood appears anxious.     Significant Labs:   CBC:   Recent Labs   Lab 05/07/19  1715 05/08/19  0542   WBC 30.26* 20.15*   HGB 9.0* 8.1*   HCT 28.3* 25.3*    256     CMP:   Recent Labs   Lab 05/07/19  1715 05/08/19  0542   * 134*   K 4.2 3.6    102   CO2 24 23   * 146*   BUN 17 15   CREATININE 0.9 0.7   CALCIUM 8.9 8.8   PROT 6.3  --    ALBUMIN 2.4*  --    BILITOT 0.6  --    ALKPHOS 90  --    AST 27  --    ALT 14  --    ANIONGAP 7* 9   EGFRNONAA >60 >60       Significant Imaging: I have reviewed all pertinent imaging results/findings within the past 24 hours.    Assessment/Plan:      * Breast mass, right  --concerning for malignancy. CT at previous facility showed mass involving bilateral breast, lung, and bone. Will order CT abdomen Pelvis for further evaluation  --Oncology consulted  --General Surgery consulted for tissue biopsy    5/8/19 plans to perform biopsy once sepsis rule out or treatment          Acute blood loss anemia  --normocytic anemia s/p 2 units PRBC's   --could be related to  malignancy  --has never had colonoscopy    5/8/19  Hemoglobin 8.1. Down from 9. Closely monitor    Leukocytosis  -infectious etiology not found  --reactive vs ?sepsis which is unlikely  --CT Chest negative for infectious process. CT abdomen for further evaluation  --empiric Cefepime and Vancomycin    5/8/19  30k>>20k today  Unlikely sepsis  Empiric Cefepime and Vancomycin  Infectious Disease consulted      Left renal mass  5.4 cm mass  Likely a different primary  Will need outpatient biopsy      Venous stasis dermatitis of both lower extremities  --No signs of infection  --daily skin care    Morbid obesity  Counseled on need for weight loss        VTE Risk Mitigation (From admission, onward)    None              Trena Craig NP  Department of Hospital Medicine   [facility]

## 2019-05-08 NOTE — ASSESSMENT & PLAN NOTE
May 8, 2019  --WBC trending downward. No clear etiology thus far. ID consulted. General surgery would like to r/o sepsis prior to perform biopsy. Continue empiric IV abx

## 2019-05-08 NOTE — HPI
The patient was accepted in transfer from the [facility] for a fungating bleeding right breast mass that was suspicious for breast cancer.  She underwent a CT scan of her chest that showed a right and left breast masses, right axillary lymph nodes and likely metastatic disease.  She was admitted by the medical service and surgery Oncology consult were obtained.  The patient had a CT scan of her abdomen that showed a renal mass.    The patient states she noticed a breast mass about 4 weeks ago.  She says it bleeds if it rubs on her clothes.  It is not associated with a significant pain.

## 2019-05-08 NOTE — NURSING
D/C mitchell catheter. Patient tolerated well. Education provided. Patient due to void by 1210. Patient verbalized understanding. Hat in toilet. Will continue to monitor.

## 2019-05-08 NOTE — HOSPITAL COURSE
Patient was accepted in transfer by the medical service from an outside facility.  She was evaluated with a CT scan of an abdomen.  CT scan of her chest is pending.  She was seen by surgery and Oncology.    Patient has a right breast mass growing through the skin that is approximately 5 x 5 cm.  It is highly suspicious for breast cancer.  Imaging, CT scan of the chest, at an outside facility is also concerning for left-sided breast cancer and metastatic disease.    The patient is unable to get diagnostic mammography, breast ultrasound or breast biopsies at this facility due to her limitations    05/09/2019.  No real changes.  Patient has no housing in the local area.  Necessary breast imaging cannot be done in this facility.  Patient can be sent on a day pass to the office building next or for imaging.  She could also be sent to the [facility] for imaging and biopsy if there is availability but this is unlikely.  CT of the chest is consistent with bilateral breast masses, right axillary adenopathy and pulmonary nodules likely representing metastatic disease.  If a breast cancer workup can be done as an inpatient at [facility] when she could be transferred to that facility    05/10/2019.  Patient went to the [facility] yesterday for breast imaging.  Mammograms and ultrasounds were both highly suspicious for bilateral breast cancer and right axillary adenopathy.  A 2nd trip to the [facility] is planned on Tuesday for breast and axillary biopsies at approximately noon    05/13/2019.  Oncology is requesting a renal biopsy which planned for today.  Breast and axillary lymph node biopsies at the [facility] tomorrow.  Patient offers no complaints    05/14/2019.  Missed radiation yesterday.  Kidney biopsy completed. No complaints.  For bilateral breast and axillary biopsies today    05/15/2019.  Biopsy performed yesterday.  No complaints of significant postprocedural pain or bruising

## 2019-05-08 NOTE — SUBJECTIVE & OBJECTIVE
Oncology Treatment Plan:   [No treatment plan]    Medications:  Continuous Infusions:  Scheduled Meds:   ceFEPime (MAXIPIME) IVPB  2 g Intravenous Q12H    furosemide  40 mg Intravenous BID    vancomycin (VANCOCIN) IVPB  1,750 mg Intravenous Q12H     PRN Meds:HYDROcodone-acetaminophen, ramelteon     Review of patient's allergies indicates:  No Known Allergies     Past Medical History:   Diagnosis Date    Encounter for blood transfusion     Morbid obesity 5/7/2019    Venous stasis dermatitis of both lower extremities 5/7/2019     History reviewed. No pertinent surgical history.  Family History     None        Tobacco Use    Smoking status: Current Every Day Smoker     Packs/day: 1.00   Substance and Sexual Activity    Alcohol use: Not Currently    Drug use: Never    Sexual activity: Not Currently       Review of Systems   Constitutional: Negative for activity change, appetite change, chills, diaphoresis, fatigue, fever and unexpected weight change.   HENT: Negative for congestion, mouth sores, nosebleeds, sore throat, trouble swallowing and voice change.    Eyes: Negative for photophobia and visual disturbance.   Respiratory: Negative for cough, chest tightness, shortness of breath and wheezing.    Cardiovascular: Negative for chest pain, palpitations and leg swelling.   Gastrointestinal: Negative for abdominal distention, abdominal pain, anal bleeding, blood in stool, constipation, diarrhea, nausea, rectal pain and vomiting.   Genitourinary: Negative for difficulty urinating, dysuria and hematuria.   Musculoskeletal: Negative for arthralgias, back pain and myalgias.   Skin: Positive for wound. Negative for pallor and rash.   Neurological: Negative for dizziness, syncope, weakness, light-headedness and headaches.   Hematological: Negative for adenopathy. Does not bruise/bleed easily.   Psychiatric/Behavioral: The patient is nervous/anxious.      Objective:     Vital Signs (Most Recent):  Temp: 97.7 °F (36.5  °C) (05/08/19 1213)  Pulse: 83 (05/08/19 1213)  Resp: 18 (05/08/19 1213)  BP: 129/60 (05/08/19 1213)  SpO2: 98 % (05/08/19 1213) Vital Signs (24h Range):  Temp:  [97.7 °F (36.5 °C)-99.6 °F (37.6 °C)] 97.7 °F (36.5 °C)  Pulse:  [83-95] 83  Resp:  [18-20] 18  SpO2:  [98 %-100 %] 98 %  BP: (102-129)/(45-60) 129/60     Weight: (!) 149.2 kg (329 lb)  Body mass index is 53.1 kg/m².  Body surface area is 2.64 meters squared.      Intake/Output Summary (Last 24 hours) at 5/8/2019 1217  Last data filed at 5/8/2019 0500  Gross per 24 hour   Intake 50 ml   Output 4200 ml   Net -4150 ml       Physical Exam   Constitutional: She is oriented to person, place, and time. She appears well-developed and well-nourished. She is cooperative.   HENT:   Head: Normocephalic.   Right Ear: Hearing normal. No drainage.   Left Ear: Hearing normal. No drainage.   Nose: Nose normal.   Mouth/Throat: Oropharynx is clear and moist.   Eyes: Conjunctivae, EOM and lids are normal. Right eye exhibits no discharge. Left eye exhibits no discharge. No scleral icterus.   Neck: Normal range of motion. No thyroid mass present.   Cardiovascular: Normal rate, regular rhythm and normal heart sounds.   No murmur heard.  Pulmonary/Chest: Effort normal. No respiratory distress. She has decreased breath sounds. She has no wheezes. She has no rhonchi. She has no rales. She exhibits mass. Right breast exhibits skin change and tenderness. Left breast exhibits no nipple discharge.       Abdominal: Soft. Bowel sounds are normal. She exhibits no distension. There is no tenderness.   Genitourinary:   Genitourinary Comments: deferred   Musculoskeletal: Normal range of motion. She exhibits edema.   BLE edema   Lymphadenopathy:        Head (right side): No submandibular, no preauricular and no posterior auricular adenopathy present.        Head (left side): No submandibular, no preauricular and no posterior auricular adenopathy present.        Right cervical: No superficial  cervical adenopathy present.       Left cervical: No superficial cervical adenopathy present.   Neurological: She is alert and oriented to person, place, and time.   Skin: Skin is warm and dry.   Psychiatric: Her speech is normal and behavior is normal. Thought content normal. Her mood appears anxious. She exhibits a depressed mood.   Vitals reviewed.      Significant Labs:   BMP:   Recent Labs   Lab 05/07/19  1715 05/08/19  0542   * 146*   * 134*   K 4.2 3.6    102   CO2 24 23   BUN 17 15   CREATININE 0.9 0.7   CALCIUM 8.9 8.8   , CBC:   Recent Labs   Lab 05/07/19 1715 05/08/19  0542   WBC 30.26* 20.15*   HGB 9.0* 8.1*   HCT 28.3* 25.3*    256   , LFTs:   Recent Labs   Lab 05/07/19  1715   ALT 14   AST 27   ALKPHOS 90   BILITOT 0.6   PROT 6.3   ALBUMIN 2.4*    and All pertinent labs from the last 24 hours have been reviewed.    Diagnostic Results:  I have reviewed all pertinent imaging results/findings within the past 24 hours.

## 2019-05-09 ENCOUNTER — DOCUMENTATION ONLY (OUTPATIENT)
Dept: RADIATION ONCOLOGY | Facility: CLINIC | Age: 59
End: 2019-05-09

## 2019-05-09 LAB
ANION GAP SERPL CALC-SCNC: 8 MMOL/L (ref 8–16)
BASOPHILS # BLD AUTO: 0.02 K/UL (ref 0–0.2)
BASOPHILS NFR BLD: 0.2 % (ref 0–1.9)
BUN SERPL-MCNC: 12 MG/DL (ref 6–20)
CALCIUM SERPL-MCNC: 8.9 MG/DL (ref 8.7–10.5)
CHLORIDE SERPL-SCNC: 95 MMOL/L (ref 95–110)
CO2 SERPL-SCNC: 32 MMOL/L (ref 23–29)
CREAT SERPL-MCNC: 0.7 MG/DL (ref 0.5–1.4)
DIFFERENTIAL METHOD: ABNORMAL
EOSINOPHIL # BLD AUTO: 0.1 K/UL (ref 0–0.5)
EOSINOPHIL NFR BLD: 0.6 % (ref 0–8)
ERYTHROCYTE [DISTWIDTH] IN BLOOD BY AUTOMATED COUNT: 14.7 % (ref 11.5–14.5)
EST. GFR  (AFRICAN AMERICAN): >60 ML/MIN/1.73 M^2
EST. GFR  (NON AFRICAN AMERICAN): >60 ML/MIN/1.73 M^2
GLUCOSE SERPL-MCNC: 119 MG/DL (ref 70–110)
HCT VFR BLD AUTO: 24.9 % (ref 37–48.5)
HGB BLD-MCNC: 8.2 G/DL (ref 12–16)
LYMPHOCYTES # BLD AUTO: 1 K/UL (ref 1–4.8)
LYMPHOCYTES NFR BLD: 7.8 % (ref 18–48)
MCH RBC QN AUTO: 29 PG (ref 27–31)
MCHC RBC AUTO-ENTMCNC: 32.9 G/DL (ref 32–36)
MCV RBC AUTO: 88 FL (ref 82–98)
MONOCYTES # BLD AUTO: 0.8 K/UL (ref 0.3–1)
MONOCYTES NFR BLD: 6.2 % (ref 4–15)
NEUTROPHILS # BLD AUTO: 10.7 K/UL (ref 1.8–7.7)
NEUTROPHILS NFR BLD: 85.2 % (ref 38–73)
PLATELET # BLD AUTO: 256 K/UL (ref 150–350)
PMV BLD AUTO: 9.6 FL (ref 9.2–12.9)
POTASSIUM SERPL-SCNC: 3.5 MMOL/L (ref 3.5–5.1)
RBC # BLD AUTO: 2.83 M/UL (ref 4–5.4)
SODIUM SERPL-SCNC: 135 MMOL/L (ref 136–145)
VANCOMYCIN TROUGH SERPL-MCNC: 23.6 UG/ML (ref 10–22)
WBC # BLD AUTO: 12.51 K/UL (ref 3.9–12.7)

## 2019-05-09 PROCEDURE — 11000001 HC ACUTE MED/SURG PRIVATE ROOM

## 2019-05-09 PROCEDURE — 63600175 PHARM REV CODE 636 W HCPCS: Performed by: NURSE PRACTITIONER

## 2019-05-09 PROCEDURE — 80048 BASIC METABOLIC PNL TOTAL CA: CPT

## 2019-05-09 PROCEDURE — 99233 SBSQ HOSP IP/OBS HIGH 50: CPT | Mod: ,,, | Performed by: SURGERY

## 2019-05-09 PROCEDURE — 99232 SBSQ HOSP IP/OBS MODERATE 35: CPT | Mod: ,,, | Performed by: INTERNAL MEDICINE

## 2019-05-09 PROCEDURE — 25000003 PHARM REV CODE 250: Performed by: NURSE PRACTITIONER

## 2019-05-09 PROCEDURE — 80202 ASSAY OF VANCOMYCIN: CPT

## 2019-05-09 PROCEDURE — 94761 N-INVAS EAR/PLS OXIMETRY MLT: CPT

## 2019-05-09 PROCEDURE — 36415 COLL VENOUS BLD VENIPUNCTURE: CPT

## 2019-05-09 PROCEDURE — 99232 PR SUBSEQUENT HOSPITAL CARE,LEVL II: ICD-10-PCS | Mod: ,,, | Performed by: INTERNAL MEDICINE

## 2019-05-09 PROCEDURE — 27000221 HC OXYGEN, UP TO 24 HOURS

## 2019-05-09 PROCEDURE — 85025 COMPLETE CBC W/AUTO DIFF WBC: CPT

## 2019-05-09 PROCEDURE — 99233 PR SUBSEQUENT HOSPITAL CARE,LEVL III: ICD-10-PCS | Mod: ,,, | Performed by: SURGERY

## 2019-05-09 PROCEDURE — 77412 RADIATION TX DELIVERY LVL 3: CPT | Performed by: RADIOLOGY

## 2019-05-09 RX ORDER — POTASSIUM CHLORIDE 20 MEQ/1
20 TABLET, EXTENDED RELEASE ORAL DAILY
Status: DISCONTINUED | OUTPATIENT
Start: 2019-05-10 | End: 2019-05-15 | Stop reason: HOSPADM

## 2019-05-09 RX ORDER — FUROSEMIDE 40 MG/1
40 TABLET ORAL 2 TIMES DAILY
Status: DISCONTINUED | OUTPATIENT
Start: 2019-05-09 | End: 2019-05-10

## 2019-05-09 RX ADMIN — CEFEPIME HYDROCHLORIDE 2 G: 2 INJECTION, SOLUTION INTRAVENOUS at 10:05

## 2019-05-09 RX ADMIN — HYDROCODONE BITARTRATE AND ACETAMINOPHEN 1 TABLET: 7.5; 325 TABLET ORAL at 01:05

## 2019-05-09 RX ADMIN — FUROSEMIDE 40 MG: 40 TABLET ORAL at 05:05

## 2019-05-09 RX ADMIN — HYDROCODONE BITARTRATE AND ACETAMINOPHEN 1 TABLET: 7.5; 325 TABLET ORAL at 09:05

## 2019-05-09 RX ADMIN — RAMELTEON 8 MG: 8 TABLET, FILM COATED ORAL at 09:05

## 2019-05-09 RX ADMIN — HYDROCODONE BITARTRATE AND ACETAMINOPHEN 1 TABLET: 7.5; 325 TABLET ORAL at 05:05

## 2019-05-09 NOTE — ASSESSMENT & PLAN NOTE
-infectious etiology not found  --reactive vs ?sepsis which is unlikely  --CT Chest negative for infectious process. CT abdomen for further evaluation  --empiric Cefepime and Vancomycin    5/8/19  30k>>20k today  Unlikely sepsis  Empiric Cefepime and Vancomycin  Infectious Disease consulted  5/9 - Resolved - per ID leukocytosis was inflammatory, ABX discontinued

## 2019-05-09 NOTE — SUBJECTIVE & OBJECTIVE
Interval History: Unable to perform breast biopsy/imaging inpatient. In attempt to control bleeding of breast tumor Rad/Onc to perform inpatient radiation x 5 days. CBC remains stable. No acute events overnight.  Oncology Treatment Plan:   [No treatment plan]    Medications:  Continuous Infusions:  Scheduled Meds:   furosemide  40 mg Intravenous BID     PRN Meds:HYDROcodone-acetaminophen, ramelteon     Review of patient's allergies indicates:  No Known Allergies     Past Medical History:   Diagnosis Date    Encounter for blood transfusion     Morbid obesity 5/7/2019    Venous stasis dermatitis of both lower extremities 5/7/2019     History reviewed. No pertinent surgical history.  Family History     None        Tobacco Use    Smoking status: Current Every Day Smoker     Packs/day: 1.00   Substance and Sexual Activity    Alcohol use: Not Currently    Drug use: Never    Sexual activity: Not Currently       Review of Systems   Constitutional: Negative for activity change, appetite change, chills, diaphoresis, fatigue, fever and unexpected weight change.   HENT: Negative for congestion, mouth sores, nosebleeds, sore throat, trouble swallowing and voice change.    Eyes: Negative for photophobia and visual disturbance.   Respiratory: Negative for cough, chest tightness, shortness of breath and wheezing.    Cardiovascular: Negative for chest pain, palpitations and leg swelling.   Gastrointestinal: Negative for abdominal distention, abdominal pain, anal bleeding, blood in stool, constipation, diarrhea, nausea, rectal pain and vomiting.   Genitourinary: Negative for difficulty urinating, dysuria and hematuria.   Musculoskeletal: Negative for arthralgias, back pain and myalgias.   Skin: Positive for wound. Negative for pallor and rash.   Neurological: Negative for dizziness, syncope, weakness, light-headedness and headaches.   Hematological: Negative for adenopathy. Does not bruise/bleed easily.    Psychiatric/Behavioral: The patient is nervous/anxious.      Objective:     Vital Signs (Most Recent):  Temp: 98.2 °F (36.8 °C) (05/09/19 0722)  Pulse: 78 (05/09/19 0722)  Resp: 18 (05/09/19 0722)  BP: (!) 105/51 (05/09/19 0722)  SpO2: 97 % (05/09/19 0722) Vital Signs (24h Range):  Temp:  [97.7 °F (36.5 °C)-98.7 °F (37.1 °C)] 98.2 °F (36.8 °C)  Pulse:  [75-93] 78  Resp:  [18-20] 18  SpO2:  [97 %-100 %] 97 %  BP: (105-129)/(49-60) 105/51     Weight: (!) 149.2 kg (329 lb)  Body mass index is 53.1 kg/m².  Body surface area is 2.64 meters squared.      Intake/Output Summary (Last 24 hours) at 5/9/2019 1125  Last data filed at 5/9/2019 0800  Gross per 24 hour   Intake 1700 ml   Output 3100 ml   Net -1400 ml       Physical Exam   Constitutional: She is oriented to person, place, and time. She appears well-developed and well-nourished. She is cooperative.   HENT:   Head: Normocephalic.   Right Ear: Hearing normal. No drainage.   Left Ear: Hearing normal. No drainage.   Nose: Nose normal.   Mouth/Throat: Oropharynx is clear and moist.   Eyes: Conjunctivae, EOM and lids are normal. Right eye exhibits no discharge. Left eye exhibits no discharge. No scleral icterus.   Neck: Normal range of motion. No thyroid mass present.   Cardiovascular: Normal rate, regular rhythm and normal heart sounds.   No murmur heard.  Pulmonary/Chest: Effort normal. No respiratory distress. She has decreased breath sounds. She has no wheezes. She has no rhonchi. She has no rales. She exhibits mass. Right breast exhibits skin change and tenderness. Left breast exhibits no nipple discharge.       Abdominal: Soft. Bowel sounds are normal. She exhibits no distension. There is no tenderness.   Genitourinary:   Genitourinary Comments: deferred   Musculoskeletal: Normal range of motion. She exhibits edema.   BLE edema   Lymphadenopathy:        Head (right side): No submandibular, no preauricular and no posterior auricular adenopathy present.        Head  (left side): No submandibular, no preauricular and no posterior auricular adenopathy present.        Right cervical: No superficial cervical adenopathy present.       Left cervical: No superficial cervical adenopathy present.   Neurological: She is alert and oriented to person, place, and time.   Skin: Skin is warm and dry.   Psychiatric: Her speech is normal and behavior is normal. Thought content normal. Her mood appears anxious. She exhibits a depressed mood.   Vitals reviewed.      Significant Labs:   BMP:   Recent Labs   Lab 05/07/19 1715 05/08/19 0542 05/09/19  0455   * 146* 119*   * 134* 135*   K 4.2 3.6 3.5    102 95   CO2 24 23 32*   BUN 17 15 12   CREATININE 0.9 0.7 0.7   CALCIUM 8.9 8.8 8.9   , CBC:   Recent Labs   Lab 05/07/19 1715 05/08/19 0542 05/09/19  0455   WBC 30.26* 20.15* 12.51   HGB 9.0* 8.1* 8.2*   HCT 28.3* 25.3* 24.9*    256 256   , LFTs:   Recent Labs   Lab 05/07/19 1715   ALT 14   AST 27   ALKPHOS 90   BILITOT 0.6   PROT 6.3   ALBUMIN 2.4*    and All pertinent labs from the last 24 hours have been reviewed.    Diagnostic Results:  I have reviewed all pertinent imaging results/findings within the past 24 hours.

## 2019-05-09 NOTE — ASSESSMENT & PLAN NOTE
5.4 cm mass  Likely a different primary  Will need outpatient biopsy  According to patient - she was aware of kidney mass back from 2014

## 2019-05-09 NOTE — PROGRESS NOTES
Pharmacokinetic Assessment Follow Up and Sign Off: IV Vancomycin    Vancomycin serum concentration assessment(s):    Vancomycin trough 23.6 mcg/mL (H) today at 0806 prior to the 4th dose. Level was collected about 10.75 hours after the previous dose was administered.     The trough level was drawn correctly and can be used to guide therapy at this time. The measurement is above the desired definitive target range of 15 to 20 mcg/mL.    Vancomycin Regimen Plan:    Discontinue the scheduled vancomycin regimen. Do not re-dose. Permission received from Dr. Segura to de-escalate and discontinue IV antibiotics.     Therapy with Vancomycin complete and/or consult discontinued by provider.  Pharmacy will sign off, please re-consult as needed.    Please contact pharmacy at extension [phone] for questions regarding this assessment.    Thank you for the consult,   Zhanna Kaur PharmD     Patient brief summary:  Isadora Colin is a 58 y.o. female initiated on antimicrobial therapy with IV Vancomycin for treatment of suspected sepsis vs reactive leukocytosis (CT chest negative for infectious process)     The patient received a loading dose, followed by the current treatment regimen: vancomycin 1750 mg IV every 12 hours    Drug Allergies:   Review of patient's allergies indicates:  No Known Allergies    Actual Body Weight:   149.2 kg    Renal Function:   Estimated Creatinine Clearance: 131.8 mL/min (based on SCr of 0.7 mg/dL).,     CBC (last 72 hours):  Recent Labs   Lab Result Units 05/07/19 1715 05/08/19  0542 05/09/19  0455   WBC K/uL 30.26* 20.15* 12.51   Hemoglobin g/dL 9.0* 8.1* 8.2*   Hematocrit % 28.3* 25.3* 24.9*   Platelets K/uL 349 256 256   Gran% % 91.7* 89.1* 85.2*   Lymph% % 3.9* 5.3* 7.8*   Mono% % 4.7 5.2 6.2   Eosinophil% % 0.1 0.3 0.6   Basophil% % 0.1 0.1 0.2   Differential Method  Automated Automated Automated     Metabolic Panel (last 72 hours):  Recent Labs   Lab Result Units 05/07/19 1715 05/08/19 0542  05/09/19  0455   Sodium mmol/L 132* 134* 135*   Potassium mmol/L 4.2 3.6 3.5   Chloride mmol/L 101 102 95   CO2 mmol/L 24 23 32*   Glucose mg/dL 141* 146* 119*   BUN, Bld mg/dL 17 15 12   Creatinine mg/dL 0.9 0.7 0.7   Albumin g/dL 2.4*  --   --    Total Bilirubin mg/dL 0.6  --   --    Alkaline Phosphatase U/L 90  --   --    AST U/L 27  --   --    ALT U/L 14  --   --      Vancomycin Administrations:  vancomycin given in the last 96 hours                     vancomycin 1.75 g in 5 % dextrose 500 mL IVPB (mg) 1,750 mg New Bag 05/08/19 2114     1,750 mg New Bag  0843    vancomycin (VANCOCIN) 3,000 mg in dextrose 5 % 500 mL IVPB (mg) 3,000 mg New Bag 05/07/19 1910                  Drug levels (last 3 results):  Recent Labs   Lab Result Units 05/09/19  0806   Vancomycin-Trough ug/mL 23.6*     Microbiologic Results:  Microbiology Results (last 7 days)       Procedure Component Value Units Date/Time    Blood culture [[id number]] Collected:  05/07/19 2124    Order Status:  Completed Specimen:  Blood Updated:  05/09/19 0612     Blood Culture, Routine No Growth to date     Blood Culture, Routine No Growth to date    Blood culture [[id number]] Collected:  05/07/19 2127    Order Status:  Completed Specimen:  Blood Updated:  05/09/19 0612     Blood Culture, Routine No Growth to date     Blood Culture, Routine No Growth to date          Thank you for allowing us to participate in this patient's care.     Zhanna Kaur PharmD 05/09/2019 10:26 AM

## 2019-05-09 NOTE — HPI
58 year old female with history of morbid obesity, peripheral edema,chronic venous stasis dermatitis who presented to [facility] on May 6, 2018 for further evaluation of bleeding right breast mass.  Labs reflected lactic acidosis, leukocytosis(23k), and normocytic anemia. Lactic acidosis resolved after IVF's.  CT Chest revealed 8,3 cm right breast mass, 4.7 cm left breast mass, mass involving right chest wall and rib, and surrounding lymph node involvement. She was treated empirically with Zosyn and Vancomycin empirically.   Since presentation, abdominal CT scan showed -5.4 cm solid enhancing mass left kidney highly suspicious for renal cell carcinoma  CBC showed-   Component      Latest Ref Rng & Units 5/8/2019 5/7/2019   WBC      3.90 - 12.70 K/uL 20.15 (H) 30.26 (H)

## 2019-05-09 NOTE — ASSESSMENT & PLAN NOTE
Highly concerning for breast cancer.  There is also axillary adenopathy.    The patient needs a diagnostic right mammogram right ultrasound and biopsy of the right axillary lymph nodes.  Unfortunately these procedures cannot be done at this facility, see hospital course comment

## 2019-05-09 NOTE — SUBJECTIVE & OBJECTIVE
Interval History: Pt is aware of plan of care. She says swelling to her lower legs has improved. Has discomfort to right breast and is taking analgesia. No other c/o voiced.     Review of Systems   Constitutional: Negative for activity change, diaphoresis, fatigue and unexpected weight change.   HENT: Negative for congestion, ear pain and sore throat.    Eyes: Negative.    Respiratory: Negative for shortness of breath and wheezing.    Cardiovascular: Positive for leg swelling. Negative for chest pain and palpitations.   Gastrointestinal: Negative for abdominal pain, constipation, diarrhea and vomiting.   Endocrine: Negative.    Genitourinary: Negative for flank pain, hematuria and urgency.   Musculoskeletal: Negative for joint swelling and neck pain.   Skin: Positive for wound. Negative for pallor.        Bleeding right breast mass     Neurological: Negative for seizures, syncope and light-headedness.   Hematological: Negative.    Psychiatric/Behavioral: Negative.      Objective:     Vital Signs (Most Recent):  Temp: 98.2 °F (36.8 °C) (05/09/19 0722)  Pulse: 78 (05/09/19 0722)  Resp: 18 (05/09/19 0722)  BP: (!) 105/51 (05/09/19 0722)  SpO2: 97 % (05/09/19 0722) Vital Signs (24h Range):  Temp:  [98 °F (36.7 °C)-98.7 °F (37.1 °C)] 98.2 °F (36.8 °C)  Pulse:  [75-93] 78  Resp:  [18-20] 18  SpO2:  [97 %-100 %] 97 %  BP: (105-127)/(49-59) 105/51     Weight: (!) 149.2 kg (329 lb)  Body mass index is 53.1 kg/m².    Intake/Output Summary (Last 24 hours) at 5/9/2019 1443  Last data filed at 5/9/2019 0800  Gross per 24 hour   Intake 1340 ml   Output 2850 ml   Net -1510 ml      Physical Exam   Constitutional: She is oriented to person, place, and time. She appears well-developed and well-nourished.        HENT:   Head: Normocephalic and atraumatic.   Eyes: Conjunctivae and EOM are normal.   Neck: Normal range of motion. Neck supple.   Cardiovascular: Normal rate, regular rhythm and normal heart sounds.   No murmur  heard.  Pulmonary/Chest: Effort normal and breath sounds normal. No respiratory distress.        Abdominal: Soft. Bowel sounds are normal. She exhibits no distension. There is no tenderness.   Protuberant - obese     Musculoskeletal: Normal range of motion. She exhibits no edema.   Neurological: She is alert and oriented to person, place, and time.   Skin: Skin is warm and dry.   Venous stasis changes without signs of infection.    Right protruding vascular breast mass. Left palpable breast mass.  - dressing intact to right breast   Psychiatric: Her mood appears anxious.   Vitals reviewed.      Significant Labs:   CBC:   Recent Labs   Lab 05/07/19 1715 05/08/19 0542 05/09/19  0455   WBC 30.26* 20.15* 12.51   HGB 9.0* 8.1* 8.2*   HCT 28.3* 25.3* 24.9*    256 256     CMP:   Recent Labs   Lab 05/07/19 1715 05/08/19 0542 05/09/19  0455   * 134* 135*   K 4.2 3.6 3.5    102 95   CO2 24 23 32*   * 146* 119*   BUN 17 15 12   CREATININE 0.9 0.7 0.7   CALCIUM 8.9 8.8 8.9   PROT 6.3  --   --    ALBUMIN 2.4*  --   --    BILITOT 0.6  --   --    ALKPHOS 90  --   --    AST 27  --   --    ALT 14  --   --    ANIONGAP 7* 9 8   EGFRNONAA >60 >60 >60     All pertinent labs within the past 24 hours have been reviewed.    Significant Imaging: I have reviewed all pertinent imaging results/findings within the past 24 hours.

## 2019-05-09 NOTE — PROGRESS NOTES
Result:   Mammo Digital Diagnostic Bilat w/ Jeffrey  US Breast Bilateral Limited     History:  Patient is 58 y.o. and is seen for a diagnostic mammogram.     Films Compared:  Prior images (if available) were compared.     Findings:  This procedure was performed using tomosynthesis.  Computer-aided detection was utilized in the interpretation of this examination.  The breasts have scattered areas of fibroglandular density.      Left  Mammo Digital Diagnostic Bilat w/ Jeffrey  There is a 57 mm x 48 mm round mass with microlobulated margins seen in the central region of the left breast. Associated features include skin thickening.      US Breast Bilateral Limited  There is a 55 mm x 43 mm x 63 mm irregularly shaped, hypoechoic mass with microlobulated margins seen in the central region of the left breast. No enlarged or suspicious axillary lymph nodes.      Right  Mammo Digital Diagnostic Bilat w/ Jeffrey  There is a mass seen in the central region of the right breast. The study is limited as the patient was unable to stand.  Compression was also limited as the mass was protruding through the skin.  The dimensions of the mass are actually better obtained on the patient's CT of the chest and measures 8.1 x 5.7 x 5.7 cm.      US Breast Bilateral Limited  There is a mass seen in the central region of the right breast in the anterior depth. The extent of mass better demonstrated on CT as the size and protrusion through the skin. Make it difficult to evaluate the entire mass on a single ultrasound image. There are multiple abnormally enlarged axillary lymph nodes including a lymph node at the suarez o'clock position that measures 2.5 by 1.6 x 2.0 cm, abnormal lymph node also in the right axilla measuring 3.8 x 1.3 x 3.8 cm.  There is also a large necrotic appearing lymph node within the right axilla that measures 6.1 x 3.1 x 3.5 cm. The known presumed internal mammary chain lymph node seen on CT protruding through the chest wall in  the region of the medial right breast is not well demonstrated on the ultrasound or mammogram.      Impression:  Left  Mass: Left breast 57 mm x 48 mm mass at the central position. Assessment: 4C - Suspicious finding. Biopsy is recommended.      Right  Mass: Right breast mass at the central position. Assessment: 5 - Highly suggestive of malignancy. Biopsy is recommended.      BI-RADS Category:   Overall: 5 - Highly Suggestive of Malignancy        We are coordinating with our breast care coordinator to arrange biopsies of the lesions of concern at [facility] for next week

## 2019-05-09 NOTE — ASSESSMENT & PLAN NOTE
--normocytic anemia s/p 2 units PRBC's   --could be related to malignancy  --has never had colonoscopy    5/9/19  Hemoglobin 8.2. Down from 9. Closely monitor

## 2019-05-09 NOTE — H&P (VIEW-ONLY)
[facility]  Infectious Disease  Consult Note    Patient Name: Isadora Colin  MRN: [id number]  Admission Date: 5/7/2019  Hospital Length of Stay: 2 days  Attending Physician: Sagar Cloud, *  Primary Care Provider: JOLENE PHIPPS III, MD     Isolation Status: No active isolations    Patient information was obtained from patient, past medical records and ER records.      Consults  Assessment/Plan:     * Breast mass, right  5/8- oncology and gen surgery follow up    Left renal mass  Will continue outpatient follow up     Leukocytosis  Likely reactive and possibly related to oncologic process -will trend wbc and will stop therapy in AM.  She is on Cefepime and Vancomycin        Thank you for your consult. I will follow-up with patient. Please contact us if you have any additional questions.    Zeeshan Segura MD  Infectious Disease  [facility]    Subjective:     Principal Problem: Breast mass, right    HPI:   58 year old female with history of morbid obesity, peripheral edema,chronic venous stasis dermatitis who presented to [facility] on May 6, 2018 for further evaluation of bleeding right breast mass.  Labs reflected lactic acidosis, leukocytosis(23k), and normocytic anemia. Lactic acidosis resolved after IVF's.  CT Chest revealed 8,3 cm right breast mass, 4.7 cm left breast mass, mass involving right chest wall and rib, and surrounding lymph node involvement. She was treated empirically with Zosyn and Vancomycin empirically.   Since presentation, abdominal CT scan showed -5.4 cm solid enhancing mass left kidney highly suspicious for renal cell carcinoma  CBC showed-   Component      Latest Ref Rng & Units 5/8/2019 5/7/2019   WBC      3.90 - 12.70 K/uL 20.15 (H) 30.26 (H)           Past Medical History:   Diagnosis Date    Encounter for blood transfusion     Morbid obesity 5/7/2019    Venous stasis dermatitis of both lower extremities 5/7/2019       History  reviewed. No pertinent surgical history.    Review of patient's allergies indicates:  No Known Allergies    Medications:  Medications Prior to Admission   Medication Sig    furosemide (LASIX) 40 MG tablet Take 40 mg by mouth once daily.    potassium chloride (MICRO-K) 10 MEQ CpSR Take 20 mEq by mouth once.     Antibiotics (From admission, onward)    Start     Stop Route Frequency Ordered    05/08/19 0730  vancomycin 1.75 g in 5 % dextrose 500 mL IVPB      -- IV Every 12 hours (non-standard times) 05/07/19 2023 05/07/19 1930  ceFEPIme in dextrose 5% 2 gram/50 mL IVPB 2 g      -- IV Every 12 hours (non-standard times) 05/07/19 1832        Antifungals (From admission, onward)    None        Antivirals (From admission, onward)    None             There is no immunization history on file for this patient.    Family History     None        Social History     Socioeconomic History    Marital status: Single     Spouse name: Not on file    Number of children: Not on file    Years of education: Not on file    Highest education level: Not on file   Occupational History    Not on file   Social Needs    Financial resource strain: Not on file    Food insecurity:     Worry: Not on file     Inability: Not on file    Transportation needs:     Medical: Not on file     Non-medical: Not on file   Tobacco Use    Smoking status: Current Every Day Smoker     Packs/day: 1.00   Substance and Sexual Activity    Alcohol use: Not Currently    Drug use: Never    Sexual activity: Not Currently   Lifestyle    Physical activity:     Days per week: Not on file     Minutes per session: Not on file    Stress: Not on file   Relationships    Social connections:     Talks on phone: Not on file     Gets together: Not on file     Attends [employer] service: Not on file     Active member of club or organization: Not on file     Attends meetings of clubs or organizations: Not on file     Relationship status: Not on file   Other Topics  Concern    Not on file   Social History Narrative    Not on file     Review of Systems   Constitutional: Negative for activity change, diaphoresis, fatigue and unexpected weight change.   HENT: Negative for congestion, ear pain and sore throat.    Eyes: Negative.    Respiratory: Negative for shortness of breath and wheezing.    Cardiovascular: Positive for leg swelling. Negative for chest pain and palpitations.   Gastrointestinal: Negative for abdominal pain, constipation, diarrhea and vomiting.   Endocrine: Negative.    Genitourinary: Negative for flank pain, hematuria and urgency.   Musculoskeletal: Negative for joint swelling and neck pain.   Skin: Negative for pallor.        Bleeding right breast mass     Neurological: Negative for seizures, syncope and light-headedness.   Hematological: Negative.    Psychiatric/Behavioral: Negative.      Objective:     Vital Signs (Most Recent):  Temp: 98.5 °F (36.9 °C) (05/09/19 0334)  Pulse: 75 (05/09/19 0334)  Resp: 18 (05/09/19 0334)  BP: (!) 109/49 (05/09/19 0334)  SpO2: 98 % (05/09/19 0334) Vital Signs (24h Range):  Temp:  [97.7 °F (36.5 °C)-99.6 °F (37.6 °C)] 98.5 °F (36.9 °C)  Pulse:  [75-94] 75  Resp:  [18-20] 18  SpO2:  [97 %-100 %] 98 %  BP: (108-129)/(45-60) 109/49     Weight: (!) 149.2 kg (329 lb)  Body mass index is 53.1 kg/m².    Estimated Creatinine Clearance: 131.8 mL/min (based on SCr of 0.7 mg/dL).    Physical Exam   Constitutional: She is oriented to person, place, and time. She appears well-developed and well-nourished.   -     HENT:   Head: Normocephalic and atraumatic.   Eyes: EOM are normal.   Neck: Normal range of motion. Neck supple.   Cardiovascular: Normal rate, regular rhythm and normal heart sounds.   No murmur heard.  Pulmonary/Chest: Effort normal and breath sounds normal. No respiratory distress.   Communicative dyspnea improving     Abdominal: Soft. Bowel sounds are normal. She exhibits no distension. There is no tenderness.   Protuberant      Musculoskeletal: Normal range of motion. She exhibits no edema.   Neurological: She is alert and oriented to person, place, and time.   Skin: Skin is warm and dry.   Venous stasis changes without signs of infection.    Right protruding vascular breast mass. Left palpable breast mass.    Psychiatric: Her mood appears anxious.   Nursing note and vitals reviewed.      Significant Labs:   BMP:   Recent Labs   Lab 05/08/19  0542   *   *   K 3.6      CO2 23   BUN 15   CREATININE 0.7   CALCIUM 8.8     CBC:   Recent Labs   Lab 05/07/19  1715 05/08/19  0542 05/09/19  0455   WBC 30.26* 20.15* 12.51   HGB 9.0* 8.1* 8.2*   HCT 28.3* 25.3* 24.9*    256 256     All pertinent labs within the past 24 hours have been reviewed.    Significant Imaging: I have reviewed all pertinent imaging results/findings within the past 24 hours.

## 2019-05-09 NOTE — ASSESSMENT & PLAN NOTE
May 8, 2019  --WBC trending downward. No clear etiology thus far. ID consulted. General surgery would like to r/o sepsis prior to perform biopsy. Continue empiric IV abx    May 9, 2019  --WBC WNL. Patient off abx. Monitor

## 2019-05-09 NOTE — PROGRESS NOTES
Ultrasound-guided biopsy of concerning lesions arranged at the [facility] facility for Tuesday at approximately noon.  If the patient still hospitalized she can be sent for the biopsies.  If she is discharged she can do the biopsies as an outpatient.  She could also schedule the biopsied at her convenience at a facility closer to home

## 2019-05-09 NOTE — ASSESSMENT & PLAN NOTE
Concerning for breast cancer.  Diagnostic mammogram ultrasound and biopsy required.  Cannot be done at this facility.  See hospital course comment

## 2019-05-09 NOTE — PROGRESS NOTES
[facility]   General Surgery  Progress Note    Subjective:     History of Present Illness:  The patient was accepted in transfer from the [facility] for a fungating bleeding right breast mass that was suspicious for breast cancer.  She underwent a CT scan of her chest that showed a right and left breast masses, right axillary lymph nodes and likely metastatic disease.  She was admitted by the medical service and surgery Oncology consult were obtained.  The patient had a CT scan of her abdomen that showed a renal mass.    The patient states she noticed a breast mass about 4 weeks ago.  She says it bleeds if it rubs on her clothes.  It is not associated with a significant pain.    Post-Op Info:  * No surgery found *         Interval History:  No real changes.    The necessary breast imaging and biopsies cannot be done at this facility.    Options included ad ay passes to the office building next or per imaging, day passed to the [facility] facility for imaging and biopsies if there is availability, transferred [facility] if the breast cancer workup/biopsy could be done as an inpatient        Medications:  Continuous Infusions:  Scheduled Meds:   ceFEPime (MAXIPIME) IVPB  2 g Intravenous Q12H    furosemide  40 mg Intravenous BID    vancomycin (VANCOCIN) IVPB  1,750 mg Intravenous Q12H     PRN Meds:HYDROcodone-acetaminophen, ramelteon     Review of patient's allergies indicates:  No Known Allergies  Objective:     Vital Signs (Most Recent):  Temp: 98.2 °F (36.8 °C) (05/09/19 0722)  Pulse: 78 (05/09/19 0722)  Resp: 18 (05/09/19 0722)  BP: (!) 105/51 (05/09/19 0722)  SpO2: 97 % (05/09/19 0722) Vital Signs (24h Range):  Temp:  [97.7 °F (36.5 °C)-98.7 °F (37.1 °C)] 98.2 °F (36.8 °C)  Pulse:  [75-93] 78  Resp:  [18-20] 18  SpO2:  [97 %-100 %] 97 %  BP: (105-129)/(49-60) 105/51     Weight: (!) 149.2 kg (329 lb)  Body mass index is 53.1 kg/m².    Intake/Output - Last 3 Shifts       05/07 0700 - 05/08  0659 05/08 0700 - 05/09 0659 05/09 0700 - 05/10 0659    P.O.  600     IV Piggyback 50 1100     Total Intake(mL/kg) 50 (0.3) 1700 (11.4)     Urine (mL/kg/hr) 4200 4000 (1.1)     Stool 0      Total Output 4200 4000     Net -4150 -2300            Stool Occurrence 1 x            Physical Exam   Constitutional: No distress.   Morbidly obese   Neck: Normal range of motion. Neck supple.   Cardiovascular: Normal rate, regular rhythm and normal heart sounds.   Pulmonary/Chest: Effort normal and breath sounds normal.   Abdominal: Soft. Bowel sounds are normal.   Obese   Skin:   Venous stasis changes   Psychiatric: She has a normal mood and affect.   Nursing note and vitals reviewed.      Significant Labs:  CBC:   Recent Labs   Lab 05/09/19  0455   WBC 12.51   RBC 2.83*   HGB 8.2*   HCT 24.9*      MCV 88   MCH 29.0   MCHC 32.9     BMP:   Recent Labs   Lab 05/09/19  0455   *   *   K 3.5   CL 95   CO2 32*   BUN 12   CREATININE 0.7   CALCIUM 8.9     CMP:   Recent Labs   Lab 05/07/19  1715  05/09/19  0455   *   < > 119*   CALCIUM 8.9   < > 8.9   ALBUMIN 2.4*  --   --    PROT 6.3  --   --    *   < > 135*   K 4.2   < > 3.5   CO2 24   < > 32*      < > 95   BUN 17   < > 12   CREATININE 0.9   < > 0.7   ALKPHOS 90  --   --    ALT 14  --   --    AST 27  --   --    BILITOT 0.6  --   --     < > = values in this interval not displayed.       Significant Diagnostics:  CT: I have reviewed all pertinent results/findings within the past 24 hours and my personal findings are:  CT chest breast masses axillary adenopathy and likely metastatic disease    Assessment/Plan:     * Breast mass, right  Highly concerning for breast cancer.  There is also axillary adenopathy.    The patient needs a diagnostic right mammogram right ultrasound and biopsy of the right axillary lymph nodes.  Unfortunately these procedures cannot be done at this facility, see hospital course comment    Abnormal chest CT  Likely represents  metastatic disease    Breast mass, left  Concerning for breast cancer.  Diagnostic mammogram ultrasound and biopsy required.  Cannot be done at this facility.  See hospital course comment    Left renal mass  Further management and workup per Oncology/hospital Medicine    Venous stasis dermatitis of both lower extremities  Management per Medicine    Morbid obesity  This should be addressed with her primary care doctor after discharge    Leukocytosis  Likely secondary to the malignancy        Rufus Murphy MD  General Surgery  [facility]

## 2019-05-09 NOTE — ASSESSMENT & PLAN NOTE
--concerning for malignancy. CT at previous facility showed mass involving bilateral breast, lung, and bone. Will order CT abdomen Pelvis for further evaluation  --Oncology consulted  --General Surgery consulted for tissue biopsy    5/8/19 plans to perform biopsy once sepsis rule out or treatment  5/9/19 - per General Surgery - had diagnostic mammogram and U/S today in clinic. Needs biopsy of right sided lymph nodes  Will have radiation to inflammatory breast area prior to going home to  [facility] (near [place]). Oncology will arrange follow up

## 2019-05-09 NOTE — CONSULTS
[facility]  Infectious Disease  Consult Note    Patient Name: Isadora Colin  MRN: [id number]  Admission Date: 5/7/2019  Hospital Length of Stay: 2 days  Attending Physician: Sagar Cloud, *  Primary Care Provider: JOLENE PHIPPS III, MD     Isolation Status: No active isolations    Patient information was obtained from patient, past medical records and ER records.      Consults  Assessment/Plan:     * Breast mass, right  5/8- oncology and gen surgery follow up    Left renal mass  Will continue outpatient follow up     Leukocytosis  Likely reactive and possibly related to oncologic process -will trend wbc and will stop therapy in AM.  She is on Cefepime and Vancomycin        Thank you for your consult. I will follow-up with patient. Please contact us if you have any additional questions.    Zeeshan Segura MD  Infectious Disease  [facility]    Subjective:     Principal Problem: Breast mass, right    HPI:   58 year old female with history of morbid obesity, peripheral edema,chronic venous stasis dermatitis who presented to [facility] on May 6, 2018 for further evaluation of bleeding right breast mass.  Labs reflected lactic acidosis, leukocytosis(23k), and normocytic anemia. Lactic acidosis resolved after IVF's.  CT Chest revealed 8,3 cm right breast mass, 4.7 cm left breast mass, mass involving right chest wall and rib, and surrounding lymph node involvement. She was treated empirically with Zosyn and Vancomycin empirically.   Since presentation, abdominal CT scan showed -5.4 cm solid enhancing mass left kidney highly suspicious for renal cell carcinoma  CBC showed-   Component      Latest Ref Rng & Units 5/8/2019 5/7/2019   WBC      3.90 - 12.70 K/uL 20.15 (H) 30.26 (H)           Past Medical History:   Diagnosis Date    Encounter for blood transfusion     Morbid obesity 5/7/2019    Venous stasis dermatitis of both lower extremities 5/7/2019       History  reviewed. No pertinent surgical history.    Review of patient's allergies indicates:  No Known Allergies    Medications:  Medications Prior to Admission   Medication Sig    furosemide (LASIX) 40 MG tablet Take 40 mg by mouth once daily.    potassium chloride (MICRO-K) 10 MEQ CpSR Take 20 mEq by mouth once.     Antibiotics (From admission, onward)    Start     Stop Route Frequency Ordered    05/08/19 0730  vancomycin 1.75 g in 5 % dextrose 500 mL IVPB      -- IV Every 12 hours (non-standard times) 05/07/19 2023 05/07/19 1930  ceFEPIme in dextrose 5% 2 gram/50 mL IVPB 2 g      -- IV Every 12 hours (non-standard times) 05/07/19 1832        Antifungals (From admission, onward)    None        Antivirals (From admission, onward)    None             There is no immunization history on file for this patient.    Family History     None        Social History     Socioeconomic History    Marital status: Single     Spouse name: Not on file    Number of children: Not on file    Years of education: Not on file    Highest education level: Not on file   Occupational History    Not on file   Social Needs    Financial resource strain: Not on file    Food insecurity:     Worry: Not on file     Inability: Not on file    Transportation needs:     Medical: Not on file     Non-medical: Not on file   Tobacco Use    Smoking status: Current Every Day Smoker     Packs/day: 1.00   Substance and Sexual Activity    Alcohol use: Not Currently    Drug use: Never    Sexual activity: Not Currently   Lifestyle    Physical activity:     Days per week: Not on file     Minutes per session: Not on file    Stress: Not on file   Relationships    Social connections:     Talks on phone: Not on file     Gets together: Not on file     Attends Taoism service: Not on file     Active member of club or organization: Not on file     Attends meetings of clubs or organizations: Not on file     Relationship status: Not on file   Other Topics  Concern    Not on file   Social History Narrative    Not on file     Review of Systems   Constitutional: Negative for activity change, diaphoresis, fatigue and unexpected weight change.   HENT: Negative for congestion, ear pain and sore throat.    Eyes: Negative.    Respiratory: Negative for shortness of breath and wheezing.    Cardiovascular: Positive for leg swelling. Negative for chest pain and palpitations.   Gastrointestinal: Negative for abdominal pain, constipation, diarrhea and vomiting.   Endocrine: Negative.    Genitourinary: Negative for flank pain, hematuria and urgency.   Musculoskeletal: Negative for joint swelling and neck pain.   Skin: Negative for pallor.        Bleeding right breast mass     Neurological: Negative for seizures, syncope and light-headedness.   Hematological: Negative.    Psychiatric/Behavioral: Negative.      Objective:     Vital Signs (Most Recent):  Temp: 98.5 °F (36.9 °C) (05/09/19 0334)  Pulse: 75 (05/09/19 0334)  Resp: 18 (05/09/19 0334)  BP: (!) 109/49 (05/09/19 0334)  SpO2: 98 % (05/09/19 0334) Vital Signs (24h Range):  Temp:  [97.7 °F (36.5 °C)-99.6 °F (37.6 °C)] 98.5 °F (36.9 °C)  Pulse:  [75-94] 75  Resp:  [18-20] 18  SpO2:  [97 %-100 %] 98 %  BP: (108-129)/(45-60) 109/49     Weight: (!) 149.2 kg (329 lb)  Body mass index is 53.1 kg/m².    Estimated Creatinine Clearance: 131.8 mL/min (based on SCr of 0.7 mg/dL).    Physical Exam   Constitutional: She is oriented to person, place, and time. She appears well-developed and well-nourished.   -     HENT:   Head: Normocephalic and atraumatic.   Eyes: EOM are normal.   Neck: Normal range of motion. Neck supple.   Cardiovascular: Normal rate, regular rhythm and normal heart sounds.   No murmur heard.  Pulmonary/Chest: Effort normal and breath sounds normal. No respiratory distress.   Communicative dyspnea improving     Abdominal: Soft. Bowel sounds are normal. She exhibits no distension. There is no tenderness.   Protuberant      Musculoskeletal: Normal range of motion. She exhibits no edema.   Neurological: She is alert and oriented to person, place, and time.   Skin: Skin is warm and dry.   Venous stasis changes without signs of infection.    Right protruding vascular breast mass. Left palpable breast mass.    Psychiatric: Her mood appears anxious.   Nursing note and vitals reviewed.      Significant Labs:   BMP:   Recent Labs   Lab 05/08/19  0542   *   *   K 3.6      CO2 23   BUN 15   CREATININE 0.7   CALCIUM 8.8     CBC:   Recent Labs   Lab 05/07/19  1715 05/08/19  0542 05/09/19  0455   WBC 30.26* 20.15* 12.51   HGB 9.0* 8.1* 8.2*   HCT 28.3* 25.3* 24.9*    256 256     All pertinent labs within the past 24 hours have been reviewed.    Significant Imaging: I have reviewed all pertinent imaging results/findings within the past 24 hours.

## 2019-05-09 NOTE — PLAN OF CARE
Problem: Adult Inpatient Plan of Care  Goal: Plan of Care Review  Outcome: Ongoing (interventions implemented as appropriate)  Remains free from injury, pain medication controlling pain, Breast US/mammogram at [facility] today, radiation treatments initiated today, ABX continue, family at bedside, will continue to monitor  12 hour chart check completed

## 2019-05-09 NOTE — ASSESSMENT & PLAN NOTE
Likely reactive and possibly related to oncologic process -will trend wbc and will stop therapy in AM.  She is on Cefepime and Vancomycin

## 2019-05-09 NOTE — SUBJECTIVE & OBJECTIVE
Past Medical History:   Diagnosis Date    Encounter for blood transfusion     Morbid obesity 5/7/2019    Venous stasis dermatitis of both lower extremities 5/7/2019       History reviewed. No pertinent surgical history.    Review of patient's allergies indicates:  No Known Allergies    Medications:  Medications Prior to Admission   Medication Sig    furosemide (LASIX) 40 MG tablet Take 40 mg by mouth once daily.    potassium chloride (MICRO-K) 10 MEQ CpSR Take 20 mEq by mouth once.     Antibiotics (From admission, onward)    Start     Stop Route Frequency Ordered    05/08/19 0730  vancomycin 1.75 g in 5 % dextrose 500 mL IVPB      -- IV Every 12 hours (non-standard times) 05/07/19 2023 05/07/19 1930  ceFEPIme in dextrose 5% 2 gram/50 mL IVPB 2 g      -- IV Every 12 hours (non-standard times) 05/07/19 1832        Antifungals (From admission, onward)    None        Antivirals (From admission, onward)    None             There is no immunization history on file for this patient.    Family History     None        Social History     Socioeconomic History    Marital status: Single     Spouse name: Not on file    Number of children: Not on file    Years of education: Not on file    Highest education level: Not on file   Occupational History    Not on file   Social Needs    Financial resource strain: Not on file    Food insecurity:     Worry: Not on file     Inability: Not on file    Transportation needs:     Medical: Not on file     Non-medical: Not on file   Tobacco Use    Smoking status: Current Every Day Smoker     Packs/day: 1.00   Substance and Sexual Activity    Alcohol use: Not Currently    Drug use: Never    Sexual activity: Not Currently   Lifestyle    Physical activity:     Days per week: Not on file     Minutes per session: Not on file    Stress: Not on file   Relationships    Social connections:     Talks on phone: Not on file     Gets together: Not on file     Attends [employer] service:  Not on file     Active member of club or organization: Not on file     Attends meetings of clubs or organizations: Not on file     Relationship status: Not on file   Other Topics Concern    Not on file   Social History Narrative    Not on file     Review of Systems   Constitutional: Negative for activity change, diaphoresis, fatigue and unexpected weight change.   HENT: Negative for congestion, ear pain and sore throat.    Eyes: Negative.    Respiratory: Negative for shortness of breath and wheezing.    Cardiovascular: Positive for leg swelling. Negative for chest pain and palpitations.   Gastrointestinal: Negative for abdominal pain, constipation, diarrhea and vomiting.   Endocrine: Negative.    Genitourinary: Negative for flank pain, hematuria and urgency.   Musculoskeletal: Negative for joint swelling and neck pain.   Skin: Negative for pallor.        Bleeding right breast mass     Neurological: Negative for seizures, syncope and light-headedness.   Hematological: Negative.    Psychiatric/Behavioral: Negative.      Objective:     Vital Signs (Most Recent):  Temp: 98.5 °F (36.9 °C) (05/09/19 0334)  Pulse: 75 (05/09/19 0334)  Resp: 18 (05/09/19 0334)  BP: (!) 109/49 (05/09/19 0334)  SpO2: 98 % (05/09/19 0334) Vital Signs (24h Range):  Temp:  [97.7 °F (36.5 °C)-99.6 °F (37.6 °C)] 98.5 °F (36.9 °C)  Pulse:  [75-94] 75  Resp:  [18-20] 18  SpO2:  [97 %-100 %] 98 %  BP: (108-129)/(45-60) 109/49     Weight: (!) 149.2 kg (329 lb)  Body mass index is 53.1 kg/m².    Estimated Creatinine Clearance: 131.8 mL/min (based on SCr of 0.7 mg/dL).    Physical Exam   Constitutional: She is oriented to person, place, and time. She appears well-developed and well-nourished.   -     HENT:   Head: Normocephalic and atraumatic.   Eyes: EOM are normal.   Neck: Normal range of motion. Neck supple.   Cardiovascular: Normal rate, regular rhythm and normal heart sounds.   No murmur heard.  Pulmonary/Chest: Effort normal and breath sounds  normal. No respiratory distress.   Communicative dyspnea improving     Abdominal: Soft. Bowel sounds are normal. She exhibits no distension. There is no tenderness.   Protuberant     Musculoskeletal: Normal range of motion. She exhibits no edema.   Neurological: She is alert and oriented to person, place, and time.   Skin: Skin is warm and dry.   Venous stasis changes without signs of infection.    Right protruding vascular breast mass. Left palpable breast mass.    Psychiatric: Her mood appears anxious.   Nursing note and vitals reviewed.      Significant Labs:   BMP:   Recent Labs   Lab 05/08/19  0542   *   *   K 3.6      CO2 23   BUN 15   CREATININE 0.7   CALCIUM 8.8     CBC:   Recent Labs   Lab 05/07/19  1715 05/08/19  0542 05/09/19  0455   WBC 30.26* 20.15* 12.51   HGB 9.0* 8.1* 8.2*   HCT 28.3* 25.3* 24.9*    256 256     All pertinent labs within the past 24 hours have been reviewed.    Significant Imaging: I have reviewed all pertinent imaging results/findings within the past 24 hours.

## 2019-05-09 NOTE — PLAN OF CARE
Problem: Adult Inpatient Plan of Care  Goal: Plan of Care Review  Outcome: Ongoing (interventions implemented as appropriate)  POC reviewed, including indications and possible side effects of administered medications. Patient verbalized understanding and teach back. No adverse reactions noted. Patient c/o breast pain. Administered medications per order. Dressing remains CDI. Wound care performed. No s/s of acute distress noted. Patient remains free of falls and injuries during shift. Daughter at bedside. Will continue to monitor.     Chart check complete.

## 2019-05-09 NOTE — PROGRESS NOTES
"[facility] Hospital Medicine  Progress Note    Patient Name: Isadora Colin  MRN: [id number]  Patient Class: IP- Inpatient   Admission Date: 5/7/2019  Length of Stay: 2 days  Attending Physician: Sagar Cloud, *  Primary Care Provider: JOLENE PHIPPS III, MD        Subjective:     Principal Problem:Breast mass, right    HPI:  Isadora Colin is a 58 year old female with history of morbid obesity, peripheral edema,chronic venous stasis dermatitis who presented to [facility] on May 6, 2018 for further evaluation of bleeding right breast mass. She recalls noticing the breast mass around 4 weeks ago or "so". This was never evaluated by a physician. She denies fever, abdominal pain, chills, or weight loss. In ED, she was noted hypotensive (79 systolic) without tachycardia which responded to IVF's. Labs reflected lactic acidosis, leukocytosis(23k), and normocytic anemia. Lactic acidosis resolved after IVF's.  CT Chest revealed 8,3 cm right breast mass, 4.7 cm left breast mass, mass involving right chest wall and rib, and surrounding lymph node involvement. She was treated empirically with Zosyn and Vancomycin empirically. Imaging negative for infectious process. [facility] contacted for transfer for further sepsis work up and Oncology consult. She had not further bleeding from protruding breast mass. She has received 2 units of blood. She has never had a mammogram or colonoscopy. She takes Lasix for swelling, but denies having an Echocardiogram.                     Hospital Course:  Isadora Canas is a 58 year old female who was admitted to [facility] for further evaluation of right breast mass and leukocytosis. CT at previous facility shows 8 cm right breast mass, 4 cm left breast mass, and lung/bone/lymph node involvement suspicious for metastatic disease. Oncology consulted who recommended general Surgery consult for biopsy of breast mass. General Surgery " will perform biopsy once she is clinically stable and sepsis rule out or treated. CT abdomen Pelvis now shows 5.4 cm renal mass. Will need outpatient biopsy for this. For leukocytosis, patient was empirically treated with IV Vancomycin and Zosyn. CT chest did not suggest infection. Repeat labs 30k leukocytosis and now down to 20k. She will continue Cefepime and Vancomycin empirically. Infectious Disease consulted. 5/9 - pt took leave and had mammogram and U/S and has returned. WBC normalized and ID suspected leukocytosis was of inflammatory origin. Antibiotics discontinued. Had markings for radiation and will have initial radiation treatments to right breast to prevent further bleeding. Right axillary lymph node biopsy pending. Oncology will arrange follow up appointments in [facility] as pt will discharge home and continue treatments/diagnostic work up there.        Interval History: Pt is aware of plan of care. She says swelling to her lower legs has improved. Has discomfort to right breast and is taking analgesia. No other c/o voiced.     Review of Systems   Constitutional: Negative for activity change, diaphoresis, fatigue and unexpected weight change.   HENT: Negative for congestion, ear pain and sore throat.    Eyes: Negative.    Respiratory: Negative for shortness of breath and wheezing.    Cardiovascular: Positive for leg swelling. Negative for chest pain and palpitations.   Gastrointestinal: Negative for abdominal pain, constipation, diarrhea and vomiting.   Endocrine: Negative.    Genitourinary: Negative for flank pain, hematuria and urgency.   Musculoskeletal: Negative for joint swelling and neck pain.   Skin: Positive for wound. Negative for pallor.        Bleeding right breast mass     Neurological: Negative for seizures, syncope and light-headedness.   Hematological: Negative.    Psychiatric/Behavioral: Negative.      Objective:     Vital Signs (Most Recent):  Temp: 98.2 °F (36.8 °C) (05/09/19  0722)  Pulse: 78 (05/09/19 0722)  Resp: 18 (05/09/19 0722)  BP: (!) 105/51 (05/09/19 0722)  SpO2: 97 % (05/09/19 0722) Vital Signs (24h Range):  Temp:  [98 °F (36.7 °C)-98.7 °F (37.1 °C)] 98.2 °F (36.8 °C)  Pulse:  [75-93] 78  Resp:  [18-20] 18  SpO2:  [97 %-100 %] 97 %  BP: (105-127)/(49-59) 105/51     Weight: (!) 149.2 kg (329 lb)  Body mass index is 53.1 kg/m².    Intake/Output Summary (Last 24 hours) at 5/9/2019 1443  Last data filed at 5/9/2019 0800  Gross per 24 hour   Intake 1340 ml   Output 2850 ml   Net -1510 ml      Physical Exam   Constitutional: She is oriented to person, place, and time. She appears well-developed and well-nourished.        HENT:   Head: Normocephalic and atraumatic.   Eyes: Conjunctivae and EOM are normal.   Neck: Normal range of motion. Neck supple.   Cardiovascular: Normal rate, regular rhythm and normal heart sounds.   No murmur heard.  Pulmonary/Chest: Effort normal and breath sounds normal. No respiratory distress.        Abdominal: Soft. Bowel sounds are normal. She exhibits no distension. There is no tenderness.   Protuberant - obese     Musculoskeletal: Normal range of motion. She exhibits no edema.   Neurological: She is alert and oriented to person, place, and time.   Skin: Skin is warm and dry.   Venous stasis changes without signs of infection.    Right protruding vascular breast mass. Left palpable breast mass.  - dressing intact to right breast   Psychiatric: Her mood appears anxious.   Vitals reviewed.      Significant Labs:   CBC:   Recent Labs   Lab 05/07/19 1715 05/08/19  0542 05/09/19  0455   WBC 30.26* 20.15* 12.51   HGB 9.0* 8.1* 8.2*   HCT 28.3* 25.3* 24.9*    256 256     CMP:   Recent Labs   Lab 05/07/19 1715 05/08/19  0542 05/09/19  0455   * 134* 135*   K 4.2 3.6 3.5    102 95   CO2 24 23 32*   * 146* 119*   BUN 17 15 12   CREATININE 0.9 0.7 0.7   CALCIUM 8.9 8.8 8.9   PROT 6.3  --   --    ALBUMIN 2.4*  --   --    BILITOT 0.6  --   --     ALKPHOS 90  --   --    AST 27  --   --    ALT 14  --   --    ANIONGAP 7* 9 8   EGFRNONAA >60 >60 >60     All pertinent labs within the past 24 hours have been reviewed.    Significant Imaging: I have reviewed all pertinent imaging results/findings within the past 24 hours.    Assessment/Plan:      * Breast mass, right  --concerning for malignancy. CT at previous facility showed mass involving bilateral breast, lung, and bone. Will order CT abdomen Pelvis for further evaluation  --Oncology consulted  --General Surgery consulted for tissue biopsy    5/8/19 plans to perform biopsy once sepsis rule out or treatment  5/9/19 - per General Surgery - had diagnostic mammogram and U/S today in clinic. Needs biopsy of right sided lymph nodes  Will have radiation to inflammatory breast area prior to going home to  [place], [place] (near [place]). Oncology will arrange follow up          Abnormal chest CT        Breast mass, left  Had diagnostic mammogram at clinic today    Left renal mass  5.4 cm mass  Likely a different primary  Will need outpatient biopsy  According to patient - she was aware of kidney mass back from 2014      Venous stasis dermatitis of both lower extremities  --No signs of infection  --daily skin care    Morbid obesity  Counseled on need for weight loss      Leukocytosis  -infectious etiology not found  --reactive vs ?sepsis which is unlikely  --CT Chest negative for infectious process. CT abdomen for further evaluation  --empiric Cefepime and Vancomycin    5/8/19  30k>>20k today  Unlikely sepsis  Empiric Cefepime and Vancomycin  Infectious Disease consulted  5/9 - Resolved - per ID leukocytosis was inflammatory, ABX discontinued      Acute blood loss anemia  --normocytic anemia s/p 2 units PRBC's   --could be related to malignancy  --has never had colonoscopy    5/9/19  Hemoglobin 8.2. Down from 9. Closely monitor      VTE Risk Mitigation (From admission, onward)    None              Luli Lancaster,  NP  Department of Hospital Medicine   [facility]

## 2019-05-09 NOTE — NURSING
Patient transferred to the [facility] for scheduled testing per [facility] ambulance stretcher, no distress voiced or noticed,

## 2019-05-09 NOTE — ASSESSMENT & PLAN NOTE
May 8, 2019  --General surgery consult for breast mass biopsy. Gen surg would like to r/o sepsis prior to proceeding  --I spoke with Dr. Cunha, Rad/Onc who agree to see patient to determine possible radiation options  --CT Chest/Abdomen/Pelvis concerning for metastatic disease. Likely more than one primary. We will need biopsy results and further staging to determine treatment recommendations  --hemoglobin 8.1. No urgent need for additional blood transfusion. Consider PRBCs transfusion for hg < 8. WBC 20.1, likely reactive. No clear infectious source identified. ID consulted. Await ID recs  --BMP unremarkable  --Continue supportive care    May 9, 2019-Unable to perform breast biopsy/imaging inpatient, however it has been arranged for patient to be transferred to [facility] outpatient clinic to have biopsy performed today. She will then transfer back to hospital to complete radiation x 5 days to the right chest wall per Rad/Onc in hopes to control bleeding from right breast mass. Patient will then need to be arranged for outpatient follow up for further treatment recommendations. Hemoglobin 8.2. No urgent indication for blood transfusion. Transfuse if hemoglobin <8. CBC otherwise unremarkable. We will hold off on Kidney mass workup as this can be done outpatient. Patient reports known history of renal mass since 2014. This is likely slow growing and will likely not be an acute issue for this patient. Patient with known difficult social situation. She is likely to need care closer to her home town which is in [place], [place]. No family here in [place] that can provide her with living arrangements. Recommend social work consult to see if they could be of any assistance to patient.

## 2019-05-09 NOTE — SUBJECTIVE & OBJECTIVE
Interval History:  No real changes.    The necessary breast imaging and biopsies cannot be done at this facility.    Options included ad ay passes to the office building next or per imaging, day passed to the [facility] facility for imaging and biopsies if there is availability, transferred [facility] if the breast cancer workup/biopsy could be done as an inpatient        Medications:  Continuous Infusions:  Scheduled Meds:   ceFEPime (MAXIPIME) IVPB  2 g Intravenous Q12H    furosemide  40 mg Intravenous BID    vancomycin (VANCOCIN) IVPB  1,750 mg Intravenous Q12H     PRN Meds:HYDROcodone-acetaminophen, ramelteon     Review of patient's allergies indicates:  No Known Allergies  Objective:     Vital Signs (Most Recent):  Temp: 98.2 °F (36.8 °C) (05/09/19 0722)  Pulse: 78 (05/09/19 0722)  Resp: 18 (05/09/19 0722)  BP: (!) 105/51 (05/09/19 0722)  SpO2: 97 % (05/09/19 0722) Vital Signs (24h Range):  Temp:  [97.7 °F (36.5 °C)-98.7 °F (37.1 °C)] 98.2 °F (36.8 °C)  Pulse:  [75-93] 78  Resp:  [18-20] 18  SpO2:  [97 %-100 %] 97 %  BP: (105-129)/(49-60) 105/51     Weight: (!) 149.2 kg (329 lb)  Body mass index is 53.1 kg/m².    Intake/Output - Last 3 Shifts       05/07 0700 - 05/08 0659 05/08 0700 - 05/09 0659 05/09 0700 - 05/10 0659    P.O.  600     IV Piggyback 50 1100     Total Intake(mL/kg) 50 (0.3) 1700 (11.4)     Urine (mL/kg/hr) 4200 4000 (1.1)     Stool 0      Total Output 4200 4000     Net -4150 -2300            Stool Occurrence 1 x            Physical Exam   Constitutional: No distress.   Morbidly obese   Neck: Normal range of motion. Neck supple.   Cardiovascular: Normal rate, regular rhythm and normal heart sounds.   Pulmonary/Chest: Effort normal and breath sounds normal.   Abdominal: Soft. Bowel sounds are normal.   Obese   Skin:   Venous stasis changes   Psychiatric: She has a normal mood and affect.   Nursing note and vitals reviewed.      Significant Labs:  CBC:   Recent Labs   Lab 05/09/19  0455   WBC  12.51   RBC 2.83*   HGB 8.2*   HCT 24.9*      MCV 88   MCH 29.0   MCHC 32.9     BMP:   Recent Labs   Lab 05/09/19  0455   *   *   K 3.5   CL 95   CO2 32*   BUN 12   CREATININE 0.7   CALCIUM 8.9     CMP:   Recent Labs   Lab 05/07/19  1715  05/09/19  0455   *   < > 119*   CALCIUM 8.9   < > 8.9   ALBUMIN 2.4*  --   --    PROT 6.3  --   --    *   < > 135*   K 4.2   < > 3.5   CO2 24   < > 32*      < > 95   BUN 17   < > 12   CREATININE 0.9   < > 0.7   ALKPHOS 90  --   --    ALT 14  --   --    AST 27  --   --    BILITOT 0.6  --   --     < > = values in this interval not displayed.       Significant Diagnostics:  CT: I have reviewed all pertinent results/findings within the past 24 hours and my personal findings are:  CT chest breast masses axillary adenopathy and likely metastatic disease

## 2019-05-09 NOTE — PROGRESS NOTES
[facility]  Hematology/Oncology  Progress Note    Patient Name: Isadora Colin  Admission Date: 5/7/2019  Hospital Length of Stay: 2 days  Code Status: No Order     Subjective:     HPI:  58 y.o female history of morbid obesity, peripheral edema (on Lasix at home),chronic venous stasis dermatitis who presented to [facility] on May 6, 2018 for further evaluation of bleeding right breast mass which patient reports onset approximately 4 weeks ago and has progressively gotten larger.  Associated symptoms include right breast pain.  Patient denies any other symptoms. She denies CP, SOB, bowel or urinary complaints, fever, abdominal pain, chills, or weight loss.     At [facility] in ED, she was noted hypotensive (79 systolic) without tachycardia which responded to IVF's. Labs reflected lactic acidosis, leukocytosis(23k), and normocytic anemia Hg 9.3. Lactic acidosis resolved after IVF's.  CT Chest revealed 8.3 cm right breast mass, 4.7 cm left breast mass, mass involving right chest wall and rib, and surrounding lymph node involvement. She was treated empirically with Zosyn and Vancomycin IV abx. Imaging negative for infectious process.     Patient was transferred to [facility] for higher level care. She has received 2 units of blood. She has never had a mammogram or colonoscopy. Does not have regular PCP.     CT Abdomen/Pelvis done at [facility] revealed 5.4 cm renal mass. ID consulted for elevated WBC. General surgery consulted for mass biopsy. General surgery awaiting r/o sepsis to proceed with biopsy               Interval History: Unable to perform breast biopsy/imaging inpatient. In attempt to control bleeding of breast tumor Rad/Onc to perform inpatient radiation x 5 days. CBC remains stable. No acute events overnight.  Oncology Treatment Plan:   [No treatment plan]    Medications:  Continuous Infusions:  Scheduled Meds:   furosemide  40 mg Intravenous BID      PRN Meds:HYDROcodone-acetaminophen, ramelteon     Review of patient's allergies indicates:  No Known Allergies     Past Medical History:   Diagnosis Date    Encounter for blood transfusion     Morbid obesity 5/7/2019    Venous stasis dermatitis of both lower extremities 5/7/2019     History reviewed. No pertinent surgical history.  Family History     None        Tobacco Use    Smoking status: Current Every Day Smoker     Packs/day: 1.00   Substance and Sexual Activity    Alcohol use: Not Currently    Drug use: Never    Sexual activity: Not Currently       Review of Systems   Constitutional: Negative for activity change, appetite change, chills, diaphoresis, fatigue, fever and unexpected weight change.   HENT: Negative for congestion, mouth sores, nosebleeds, sore throat, trouble swallowing and voice change.    Eyes: Negative for photophobia and visual disturbance.   Respiratory: Negative for cough, chest tightness, shortness of breath and wheezing.    Cardiovascular: Negative for chest pain, palpitations and leg swelling.   Gastrointestinal: Negative for abdominal distention, abdominal pain, anal bleeding, blood in stool, constipation, diarrhea, nausea, rectal pain and vomiting.   Genitourinary: Negative for difficulty urinating, dysuria and hematuria.   Musculoskeletal: Negative for arthralgias, back pain and myalgias.   Skin: Positive for wound. Negative for pallor and rash.   Neurological: Negative for dizziness, syncope, weakness, light-headedness and headaches.   Hematological: Negative for adenopathy. Does not bruise/bleed easily.   Psychiatric/Behavioral: The patient is nervous/anxious.      Objective:     Vital Signs (Most Recent):  Temp: 98.2 °F (36.8 °C) (05/09/19 0722)  Pulse: 78 (05/09/19 0722)  Resp: 18 (05/09/19 0722)  BP: (!) 105/51 (05/09/19 0722)  SpO2: 97 % (05/09/19 0722) Vital Signs (24h Range):  Temp:  [97.7 °F (36.5 °C)-98.7 °F (37.1 °C)] 98.2 °F (36.8 °C)  Pulse:  [75-93] 78  Resp:   [18-20] 18  SpO2:  [97 %-100 %] 97 %  BP: (105-129)/(49-60) 105/51     Weight: (!) 149.2 kg (329 lb)  Body mass index is 53.1 kg/m².  Body surface area is 2.64 meters squared.      Intake/Output Summary (Last 24 hours) at 5/9/2019 1125  Last data filed at 5/9/2019 0800  Gross per 24 hour   Intake 1700 ml   Output 3100 ml   Net -1400 ml       Physical Exam   Constitutional: She is oriented to person, place, and time. She appears well-developed and well-nourished. She is cooperative.   HENT:   Head: Normocephalic.   Right Ear: Hearing normal. No drainage.   Left Ear: Hearing normal. No drainage.   Nose: Nose normal.   Mouth/Throat: Oropharynx is clear and moist.   Eyes: Conjunctivae, EOM and lids are normal. Right eye exhibits no discharge. Left eye exhibits no discharge. No scleral icterus.   Neck: Normal range of motion. No thyroid mass present.   Cardiovascular: Normal rate, regular rhythm and normal heart sounds.   No murmur heard.  Pulmonary/Chest: Effort normal. No respiratory distress. She has decreased breath sounds. She has no wheezes. She has no rhonchi. She has no rales. She exhibits mass. Right breast exhibits skin change and tenderness. Left breast exhibits no nipple discharge.       Abdominal: Soft. Bowel sounds are normal. She exhibits no distension. There is no tenderness.   Genitourinary:   Genitourinary Comments: deferred   Musculoskeletal: Normal range of motion. She exhibits edema.   BLE edema   Lymphadenopathy:        Head (right side): No submandibular, no preauricular and no posterior auricular adenopathy present.        Head (left side): No submandibular, no preauricular and no posterior auricular adenopathy present.        Right cervical: No superficial cervical adenopathy present.       Left cervical: No superficial cervical adenopathy present.   Neurological: She is alert and oriented to person, place, and time.   Skin: Skin is warm and dry.   Psychiatric: Her speech is normal and behavior  is normal. Thought content normal. Her mood appears anxious. She exhibits a depressed mood.   Vitals reviewed.      Significant Labs:   BMP:   Recent Labs   Lab 05/07/19 1715 05/08/19  0542 05/09/19  0455   * 146* 119*   * 134* 135*   K 4.2 3.6 3.5    102 95   CO2 24 23 32*   BUN 17 15 12   CREATININE 0.9 0.7 0.7   CALCIUM 8.9 8.8 8.9   , CBC:   Recent Labs   Lab 05/07/19 1715 05/08/19  0542 05/09/19  0455   WBC 30.26* 20.15* 12.51   HGB 9.0* 8.1* 8.2*   HCT 28.3* 25.3* 24.9*    256 256   , LFTs:   Recent Labs   Lab 05/07/19 1715   ALT 14   AST 27   ALKPHOS 90   BILITOT 0.6   PROT 6.3   ALBUMIN 2.4*    and All pertinent labs from the last 24 hours have been reviewed.    Diagnostic Results:  I have reviewed all pertinent imaging results/findings within the past 24 hours.    Assessment/Plan:     * Breast mass, right  May 8, 2019  --General surgery consult for breast mass biopsy. Gen surg would like to r/o sepsis prior to proceeding  --I spoke with Dr. Cunha, Rad/Onc who agree to see patient to determine possible radiation options  --CT Chest/Abdomen/Pelvis concerning for metastatic disease. Likely more than one primary. We will need biopsy results and further staging to determine treatment recommendations  --hemoglobin 8.1. No urgent need for additional blood transfusion. Consider PRBCs transfusion for hg < 8. WBC 20.1, likely reactive. No clear infectious source identified. ID consulted. Await ID recs  --BMP unremarkable  --Continue supportive care    May 9, 2019-Unable to perform breast biopsy/imaging inpatient, however it has been arranged for patient to be transferred to [facility] outpatient clinic to have biopsy performed today. She will then transfer back to hospital to complete radiation x 5 days to the right chest wall per Rad/Onc in hopes to control bleeding from right breast mass. Patient will then need to be arranged for outpatient follow up for further treatment  recommendations. Hemoglobin 8.2. No urgent indication for blood transfusion. Transfuse if hemoglobin <8. CBC otherwise unremarkable. We will hold off on Kidney mass workup as this can be done outpatient. Patient reports known history of renal mass since 2014. This is likely slow growing and will likely not be an acute issue for this patient. Patient with known difficult social situation. She is likely to need care closer to her home town which is in [place], [place]. No family here in [place] that can provide her with living arrangements. Recommend social work consult to see if they could be of any assistance to patient.     Left renal mass  May 8, 2019  --Outpatient consult to Urology for further workup. No urgent need for inpatient biopsy. Likely slow growing    Leukocytosis  May 8, 2019  --WBC trending downward. No clear etiology thus far. ID consulted. General surgery would like to r/o sepsis prior to perform biopsy. Continue empiric IV abx    May 9, 2019  --WBC WNL. Patient off abx. Monitor        Thank you for your consult. I will follow-up with patient. Please contact us if you have any additional questions.     Blaire Arguello NP  Hematology/Oncology  [facility]

## 2019-05-09 NOTE — NURSING
Patient returned from [facility] facility via jean stretcher, voices no concerns, nad noted

## 2019-05-10 DIAGNOSIS — N63.10 BREAST MASS, RIGHT: Primary | ICD-10-CM

## 2019-05-10 LAB
ANION GAP SERPL CALC-SCNC: 8 MMOL/L (ref 8–16)
BASOPHILS # BLD AUTO: 0.02 K/UL (ref 0–0.2)
BASOPHILS NFR BLD: 0.2 % (ref 0–1.9)
BUN SERPL-MCNC: 9 MG/DL (ref 6–20)
CALCIUM SERPL-MCNC: 8.7 MG/DL (ref 8.7–10.5)
CHLORIDE SERPL-SCNC: 98 MMOL/L (ref 95–110)
CO2 SERPL-SCNC: 31 MMOL/L (ref 23–29)
CREAT SERPL-MCNC: 0.6 MG/DL (ref 0.5–1.4)
DIFFERENTIAL METHOD: ABNORMAL
EOSINOPHIL # BLD AUTO: 0.1 K/UL (ref 0–0.5)
EOSINOPHIL NFR BLD: 1.3 % (ref 0–8)
ERYTHROCYTE [DISTWIDTH] IN BLOOD BY AUTOMATED COUNT: 14.6 % (ref 11.5–14.5)
EST. GFR  (AFRICAN AMERICAN): >60 ML/MIN/1.73 M^2
EST. GFR  (NON AFRICAN AMERICAN): >60 ML/MIN/1.73 M^2
GLUCOSE SERPL-MCNC: 123 MG/DL (ref 70–110)
HCT VFR BLD AUTO: 25.2 % (ref 37–48.5)
HGB BLD-MCNC: 7.8 G/DL (ref 12–16)
LYMPHOCYTES # BLD AUTO: 0.7 K/UL (ref 1–4.8)
LYMPHOCYTES NFR BLD: 7.9 % (ref 18–48)
MCH RBC QN AUTO: 27.5 PG (ref 27–31)
MCHC RBC AUTO-ENTMCNC: 31 G/DL (ref 32–36)
MCV RBC AUTO: 89 FL (ref 82–98)
MONOCYTES # BLD AUTO: 0.7 K/UL (ref 0.3–1)
MONOCYTES NFR BLD: 7.3 % (ref 4–15)
NEUTROPHILS # BLD AUTO: 7.8 K/UL (ref 1.8–7.7)
NEUTROPHILS NFR BLD: 83.3 % (ref 38–73)
PLATELET # BLD AUTO: 271 K/UL (ref 150–350)
PMV BLD AUTO: 9.6 FL (ref 9.2–12.9)
POTASSIUM SERPL-SCNC: 3.9 MMOL/L (ref 3.5–5.1)
RBC # BLD AUTO: 2.84 M/UL (ref 4–5.4)
SODIUM SERPL-SCNC: 137 MMOL/L (ref 136–145)
WBC # BLD AUTO: 9.36 K/UL (ref 3.9–12.7)

## 2019-05-10 PROCEDURE — 25000003 PHARM REV CODE 250: Performed by: NURSE PRACTITIONER

## 2019-05-10 PROCEDURE — 11000001 HC ACUTE MED/SURG PRIVATE ROOM

## 2019-05-10 PROCEDURE — 36415 COLL VENOUS BLD VENIPUNCTURE: CPT

## 2019-05-10 PROCEDURE — 99233 SBSQ HOSP IP/OBS HIGH 50: CPT | Mod: ,,, | Performed by: INTERNAL MEDICINE

## 2019-05-10 PROCEDURE — 80048 BASIC METABOLIC PNL TOTAL CA: CPT

## 2019-05-10 PROCEDURE — 85025 COMPLETE CBC W/AUTO DIFF WBC: CPT

## 2019-05-10 PROCEDURE — 27000221 HC OXYGEN, UP TO 24 HOURS

## 2019-05-10 PROCEDURE — 77412 RADIATION TX DELIVERY LVL 3: CPT | Performed by: RADIOLOGY

## 2019-05-10 PROCEDURE — 94760 N-INVAS EAR/PLS OXIMETRY 1: CPT

## 2019-05-10 PROCEDURE — 99233 PR SUBSEQUENT HOSPITAL CARE,LEVL III: ICD-10-PCS | Mod: ,,, | Performed by: SURGERY

## 2019-05-10 PROCEDURE — 99233 PR SUBSEQUENT HOSPITAL CARE,LEVL III: ICD-10-PCS | Mod: ,,, | Performed by: INTERNAL MEDICINE

## 2019-05-10 PROCEDURE — 99233 SBSQ HOSP IP/OBS HIGH 50: CPT | Mod: ,,, | Performed by: SURGERY

## 2019-05-10 RX ORDER — FUROSEMIDE 40 MG/1
40 TABLET ORAL DAILY
Status: DISCONTINUED | OUTPATIENT
Start: 2019-05-10 | End: 2019-05-12

## 2019-05-10 RX ORDER — POTASSIUM CHLORIDE 750 MG/1
20 CAPSULE, EXTENDED RELEASE ORAL ONCE
Status: DISCONTINUED | OUTPATIENT
Start: 2019-05-10 | End: 2019-05-10

## 2019-05-10 RX ORDER — BISACODYL 5 MG
10 TABLET, DELAYED RELEASE (ENTERIC COATED) ORAL ONCE
Status: COMPLETED | OUTPATIENT
Start: 2019-05-10 | End: 2019-05-10

## 2019-05-10 RX ORDER — HYDROCODONE BITARTRATE AND ACETAMINOPHEN 10; 325 MG/1; MG/1
1 TABLET ORAL EVERY 6 HOURS PRN
Status: DISCONTINUED | OUTPATIENT
Start: 2019-05-10 | End: 2019-05-13

## 2019-05-10 RX ADMIN — HYDROCODONE BITARTRATE AND ACETAMINOPHEN 1 TABLET: 10; 325 TABLET ORAL at 09:05

## 2019-05-10 RX ADMIN — HYDROCODONE BITARTRATE AND ACETAMINOPHEN 1 TABLET: 10; 325 TABLET ORAL at 04:05

## 2019-05-10 RX ADMIN — HYDROCODONE BITARTRATE AND ACETAMINOPHEN 1 TABLET: 7.5; 325 TABLET ORAL at 03:05

## 2019-05-10 RX ADMIN — HYDROCODONE BITARTRATE AND ACETAMINOPHEN 1 TABLET: 10; 325 TABLET ORAL at 10:05

## 2019-05-10 RX ADMIN — POTASSIUM CHLORIDE 20 MEQ: 1500 TABLET, EXTENDED RELEASE ORAL at 08:05

## 2019-05-10 RX ADMIN — BISACODYL 10 MG: 5 TABLET, COATED ORAL at 11:05

## 2019-05-10 RX ADMIN — FUROSEMIDE 40 MG: 40 TABLET ORAL at 08:05

## 2019-05-10 NOTE — SUBJECTIVE & OBJECTIVE
Interval History: Unable to perform breast biopsy/imaging inpatient. In attempt to control bleeding of breast tumor Rad/Onc to perform inpatient radiation x 5 days. CBC remains stable. No acute events overnight.  May 9, 2019--Patient is s/p Bilateral Diagnostic Mammogram and Bilateral breast ultrasound. IR consult today for possible axillary node/renal mass biopsy. Patient has received 1st dose of radiation on yesterday.   Oncology Treatment Plan:   [No treatment plan]    Medications:  Continuous Infusions:  Scheduled Meds:   bisacodyl  10 mg Oral Once    furosemide  40 mg Oral Daily    potassium chloride  20 mEq Oral Daily     PRN Meds:HYDROcodone-acetaminophen, ramelteon     Review of patient's allergies indicates:  No Known Allergies     Past Medical History:   Diagnosis Date    Encounter for blood transfusion     Morbid obesity 5/7/2019    Venous stasis dermatitis of both lower extremities 5/7/2019     History reviewed. No pertinent surgical history.  Family History     None        Tobacco Use    Smoking status: Current Every Day Smoker     Packs/day: 1.00   Substance and Sexual Activity    Alcohol use: Not Currently    Drug use: Never    Sexual activity: Not Currently       Review of Systems   Constitutional: Negative for activity change, appetite change, chills, diaphoresis, fatigue, fever and unexpected weight change.   HENT: Negative for congestion, mouth sores, nosebleeds, sore throat, trouble swallowing and voice change.    Eyes: Negative for photophobia and visual disturbance.   Respiratory: Negative for cough, chest tightness, shortness of breath and wheezing.    Cardiovascular: Negative for chest pain, palpitations and leg swelling.   Gastrointestinal: Negative for abdominal distention, abdominal pain, anal bleeding, blood in stool, constipation, diarrhea, nausea, rectal pain and vomiting.   Genitourinary: Negative for difficulty urinating, dysuria and hematuria.   Musculoskeletal: Negative  for arthralgias, back pain and myalgias.   Skin: Positive for wound. Negative for pallor and rash.   Neurological: Negative for dizziness, syncope, weakness, light-headedness and headaches.   Hematological: Negative for adenopathy. Does not bruise/bleed easily.   Psychiatric/Behavioral: The patient is nervous/anxious.      Objective:     Vital Signs (Most Recent):  Temp: 97.3 °F (36.3 °C) (05/10/19 0745)  Pulse: 74 (05/10/19 0912)  Resp: 18 (05/10/19 0912)  BP: 126/63 (05/10/19 0745)  SpO2: 95 % (05/10/19 0745) Vital Signs (24h Range):  Temp:  [97.3 °F (36.3 °C)-98.4 °F (36.9 °C)] 97.3 °F (36.3 °C)  Pulse:  [73-78] 74  Resp:  [18] 18  SpO2:  [93 %-100 %] 95 %  BP: (120-132)/(58-64) 126/63     Weight: (!) 149.2 kg (329 lb)  Body mass index is 53.1 kg/m².  Body surface area is 2.64 meters squared.      Intake/Output Summary (Last 24 hours) at 5/10/2019 1054  Last data filed at 5/9/2019 1800  Gross per 24 hour   Intake 360 ml   Output 300 ml   Net 60 ml       Physical Exam   Constitutional: She is oriented to person, place, and time. She appears well-developed and well-nourished. She is cooperative.   HENT:   Head: Normocephalic.   Right Ear: Hearing normal. No drainage.   Left Ear: Hearing normal. No drainage.   Nose: Nose normal.   Mouth/Throat: Oropharynx is clear and moist.   Eyes: Conjunctivae, EOM and lids are normal. Right eye exhibits no discharge. Left eye exhibits no discharge. No scleral icterus.   Neck: Normal range of motion. No thyroid mass present.   Cardiovascular: Normal rate, regular rhythm and normal heart sounds.   No murmur heard.  Pulmonary/Chest: Effort normal. No respiratory distress. She has decreased breath sounds. She has no wheezes. She has no rhonchi. She has no rales. She exhibits mass. Right breast exhibits skin change and tenderness. Left breast exhibits no nipple discharge.       Abdominal: Soft. Bowel sounds are normal. She exhibits no distension. There is no tenderness.    Genitourinary:   Genitourinary Comments: deferred   Musculoskeletal: Normal range of motion. She exhibits edema.   BLE edema   Lymphadenopathy:        Head (right side): No submandibular, no preauricular and no posterior auricular adenopathy present.        Head (left side): No submandibular, no preauricular and no posterior auricular adenopathy present.        Right cervical: No superficial cervical adenopathy present.       Left cervical: No superficial cervical adenopathy present.   Neurological: She is alert and oriented to person, place, and time.   Skin: Skin is warm and dry.   Psychiatric: Her speech is normal and behavior is normal. Thought content normal. Her mood appears anxious. She exhibits a depressed mood.   Vitals reviewed.      Significant Labs:   BMP:   Recent Labs   Lab 05/09/19  0455 05/10/19  0507   * 123*   * 137   K 3.5 3.9   CL 95 98   CO2 32* 31*   BUN 12 9   CREATININE 0.7 0.6   CALCIUM 8.9 8.7   , CBC:   Recent Labs   Lab 05/09/19  0455 05/10/19  0507   WBC 12.51 9.36   HGB 8.2* 7.8*   HCT 24.9* 25.2*    271   , LFTs:   No results for input(s): ALT, AST, ALKPHOS, BILITOT, PROT, ALBUMIN in the last 48 hours. and All pertinent labs from the last 24 hours have been reviewed.    Diagnostic Results:  I have reviewed all pertinent imaging results/findings within the past 24 hours.

## 2019-05-10 NOTE — PROGRESS NOTES
[facility]   General Surgery  Progress Note    Subjective:     History of Present Illness:  The patient was accepted in transfer from the [facility] for a fungating bleeding right breast mass that was suspicious for breast cancer.  She underwent a CT scan of her chest that showed a right and left breast masses, right axillary lymph nodes and likely metastatic disease.  She was admitted by the medical service and surgery Oncology consult were obtained.  The patient had a CT scan of her abdomen that showed a renal mass.    The patient states she noticed a breast mass about 4 weeks ago.  She says it bleeds if it rubs on her clothes.  It is not associated with a significant pain.    Post-Op Info:  * No surgery found *         Interval History:  Patient underwent breast imaging.  Concerning for bilateral breast cancer with right axillary adenopathy    Radiation treatment to the  mass on the right breast has begun    Medications:  Continuous Infusions:  Scheduled Meds:   furosemide  40 mg Oral Daily    potassium chloride  20 mEq Oral Daily     PRN Meds:HYDROcodone-acetaminophen, ramelteon     Review of patient's allergies indicates:  No Known Allergies  Objective:     Vital Signs (Most Recent):  Temp: 97.3 °F (36.3 °C) (05/10/19 0745)  Pulse: 74 (05/10/19 0912)  Resp: 18 (05/10/19 0912)  BP: 126/63 (05/10/19 0745)  SpO2: 95 % (05/10/19 0745) Vital Signs (24h Range):  Temp:  [97.3 °F (36.3 °C)-98.4 °F (36.9 °C)] 97.3 °F (36.3 °C)  Pulse:  [73-78] 74  Resp:  [18] 18  SpO2:  [93 %-100 %] 95 %  BP: (120-132)/(58-64) 126/63     Weight: (!) 149.2 kg (329 lb)  Body mass index is 53.1 kg/m².    Intake/Output - Last 3 Shifts       05/08 0700 - 05/09 0659 05/09 0700 - 05/10 0659 05/10 0700 - 05/11 0659    P.O. 600 600     IV Piggyback 1100      Total Intake(mL/kg) 1700 (11.4) 600 (4)     Urine (mL/kg/hr) 4000 (1.1) 300 (0.1)     Stool       Total Output 4000 300     Net -2300 +300            Urine Occurrence   5 x           Physical Exam   Constitutional: No distress.   Morbidly obese   HENT:   Head: Normocephalic.   Neck: Neck supple.   Cardiovascular: Normal rate, regular rhythm and normal heart sounds.   Pulmonary/Chest: Effort normal and breath sounds normal.   Abdominal: Soft. Bowel sounds are normal. She exhibits no distension. There is no tenderness.   Obese   Skin:   Previous breast exam showed a mass in the central region of the left breast.  There is a mass in the central reason of the right breast as well as a large mass on the external surface of the breast.  There is right axillary adenopathy             Significant Diagnostics:    Mammogram and breast ultrasound were reviewed    Assessment/Plan:     * Breast mass, right  Highly concerning for breast cancer.  On imaging.  There is also enlarged axillary nodes.    The plan is for her to go to the [facility] facility on Tuesday for a scheduled biopsy at noon     Abnormal chest CT  Likely represents metastatic disease    Breast mass, left  Concerning for breast cancer on mammogram and ultrasound.    Biopsies of the left breast as well as the right breast and right axillary abnormalities are planned for Tuesday at the [facility] at approximately noon.    The patient will need to be given a travel past for this    Left renal mass  Further management and workup per Oncology/hospital Medicine    Venous stasis dermatitis of both lower extremities  Management per Medicine    Morbid obesity  This should be addressed with her primary care doctor after discharge    Leukocytosis  Likely secondary to the malignancy        Rufus Murphy MD  General Surgery  [facility]

## 2019-05-10 NOTE — PLAN OF CARE
Problem: Adult Inpatient Plan of Care  Goal: Plan of Care Review  Outcome: Ongoing (interventions implemented as appropriate)  POC reviewed, including indications and possible side effects of administered medications. Patient verbalized understanding and teach back. No adverse reactions noted. Patient c/o breast pain. Administered medications per order. Dressing remains CDI. No s/s of acute distress noted. Patient remains free of falls and injuries during shift. Will continue to monitor.     Chart check complete.

## 2019-05-10 NOTE — ASSESSMENT & PLAN NOTE
Concerning for breast cancer on mammogram and ultrasound.    Biopsies of the left breast as well as the right breast and right axillary abnormalities are planned for Tuesday at the [facility] at approximately noon.    The patient will need to be given a travel past for this

## 2019-05-10 NOTE — PLAN OF CARE
Problem: Adult Inpatient Plan of Care  Goal: Plan of Care Review  Outcome: Ongoing (interventions implemented as appropriate)  SIM and day 1 of xrt to right breast today 5-9-19. Breast handout and verbal instructions given. Skin care and side effects reviewed. Contact info given. Patient verbalized understanding.

## 2019-05-10 NOTE — CONSULTS
CT guided biopsy of renal mass and axillary lymphadenopathy will be performed as an outpatient as discussed.  Please place the orders for the biopsies requested as an outpatient and we will get it scheduled.  Thank you for the consult.

## 2019-05-10 NOTE — PROGRESS NOTES
[facility]  Hematology/Oncology  Progress Note    Patient Name: Isadora Colin  Admission Date: 5/7/2019  Hospital Length of Stay: 3 days  Code Status: No Order     Subjective:     HPI:  58 y.o female history of morbid obesity, peripheral edema (on Lasix at home),chronic venous stasis dermatitis who presented to [facility] on May 6, 2018 for further evaluation of bleeding right breast mass which patient reports onset approximately 4 weeks ago and has progressively gotten larger.  Associated symptoms include right breast pain.  Patient denies any other symptoms. She denies CP, SOB, bowel or urinary complaints, fever, abdominal pain, chills, or weight loss.     At [facility] in ED, she was noted hypotensive (79 systolic) without tachycardia which responded to IVF's. Labs reflected lactic acidosis, leukocytosis(23k), and normocytic anemia Hg 9.3. Lactic acidosis resolved after IVF's.  CT Chest revealed 8.3 cm right breast mass, 4.7 cm left breast mass, mass involving right chest wall and rib, and surrounding lymph node involvement. She was treated empirically with Zosyn and Vancomycin IV abx. Imaging negative for infectious process.     Patient was transferred to [facility] for higher level care. She has received 2 units of blood. She has never had a mammogram or colonoscopy. Does not have regular PCP.     CT Abdomen/Pelvis done at [facility] revealed 5.4 cm renal mass. ID consulted for elevated WBC. General surgery consulted for mass biopsy. General surgery awaiting r/o sepsis to proceed with biopsy               Interval History: Unable to perform breast biopsy/imaging inpatient. In attempt to control bleeding of breast tumor Rad/Onc to perform inpatient radiation x 5 days. CBC remains stable. No acute events overnight.  May 9, 2019--Patient is s/p Bilateral Diagnostic Mammogram and Bilateral breast ultrasound. IR consult today for possible axillary node/renal  mass biopsy. Patient has received 1st dose of radiation on yesterday.   Oncology Treatment Plan:   [No treatment plan]    Medications:  Continuous Infusions:  Scheduled Meds:   bisacodyl  10 mg Oral Once    furosemide  40 mg Oral Daily    potassium chloride  20 mEq Oral Daily     PRN Meds:HYDROcodone-acetaminophen, ramelteon     Review of patient's allergies indicates:  No Known Allergies     Past Medical History:   Diagnosis Date    Encounter for blood transfusion     Morbid obesity 5/7/2019    Venous stasis dermatitis of both lower extremities 5/7/2019     History reviewed. No pertinent surgical history.  Family History     None        Tobacco Use    Smoking status: Current Every Day Smoker     Packs/day: 1.00   Substance and Sexual Activity    Alcohol use: Not Currently    Drug use: Never    Sexual activity: Not Currently       Review of Systems   Constitutional: Negative for activity change, appetite change, chills, diaphoresis, fatigue, fever and unexpected weight change.   HENT: Negative for congestion, mouth sores, nosebleeds, sore throat, trouble swallowing and voice change.    Eyes: Negative for photophobia and visual disturbance.   Respiratory: Negative for cough, chest tightness, shortness of breath and wheezing.    Cardiovascular: Negative for chest pain, palpitations and leg swelling.   Gastrointestinal: Negative for abdominal distention, abdominal pain, anal bleeding, blood in stool, constipation, diarrhea, nausea, rectal pain and vomiting.   Genitourinary: Negative for difficulty urinating, dysuria and hematuria.   Musculoskeletal: Negative for arthralgias, back pain and myalgias.   Skin: Positive for wound. Negative for pallor and rash.   Neurological: Negative for dizziness, syncope, weakness, light-headedness and headaches.   Hematological: Negative for adenopathy. Does not bruise/bleed easily.   Psychiatric/Behavioral: The patient is nervous/anxious.      Objective:     Vital Signs (Most  Recent):  Temp: 97.3 °F (36.3 °C) (05/10/19 0745)  Pulse: 74 (05/10/19 0912)  Resp: 18 (05/10/19 0912)  BP: 126/63 (05/10/19 0745)  SpO2: 95 % (05/10/19 0745) Vital Signs (24h Range):  Temp:  [97.3 °F (36.3 °C)-98.4 °F (36.9 °C)] 97.3 °F (36.3 °C)  Pulse:  [73-78] 74  Resp:  [18] 18  SpO2:  [93 %-100 %] 95 %  BP: (120-132)/(58-64) 126/63     Weight: (!) 149.2 kg (329 lb)  Body mass index is 53.1 kg/m².  Body surface area is 2.64 meters squared.      Intake/Output Summary (Last 24 hours) at 5/10/2019 1054  Last data filed at 5/9/2019 1800  Gross per 24 hour   Intake 360 ml   Output 300 ml   Net 60 ml       Physical Exam   Constitutional: She is oriented to person, place, and time. She appears well-developed and well-nourished. She is cooperative.   HENT:   Head: Normocephalic.   Right Ear: Hearing normal. No drainage.   Left Ear: Hearing normal. No drainage.   Nose: Nose normal.   Mouth/Throat: Oropharynx is clear and moist.   Eyes: Conjunctivae, EOM and lids are normal. Right eye exhibits no discharge. Left eye exhibits no discharge. No scleral icterus.   Neck: Normal range of motion. No thyroid mass present.   Cardiovascular: Normal rate, regular rhythm and normal heart sounds.   No murmur heard.  Pulmonary/Chest: Effort normal. No respiratory distress. She has decreased breath sounds. She has no wheezes. She has no rhonchi. She has no rales. She exhibits mass. Right breast exhibits skin change and tenderness. Left breast exhibits no nipple discharge.       Abdominal: Soft. Bowel sounds are normal. She exhibits no distension. There is no tenderness.   Genitourinary:   Genitourinary Comments: deferred   Musculoskeletal: Normal range of motion. She exhibits edema.   BLE edema   Lymphadenopathy:        Head (right side): No submandibular, no preauricular and no posterior auricular adenopathy present.        Head (left side): No submandibular, no preauricular and no posterior auricular adenopathy present.        Right  cervical: No superficial cervical adenopathy present.       Left cervical: No superficial cervical adenopathy present.   Neurological: She is alert and oriented to person, place, and time.   Skin: Skin is warm and dry.   Psychiatric: Her speech is normal and behavior is normal. Thought content normal. Her mood appears anxious. She exhibits a depressed mood.   Vitals reviewed.      Significant Labs:   BMP:   Recent Labs   Lab 05/09/19  0455 05/10/19  0507   * 123*   * 137   K 3.5 3.9   CL 95 98   CO2 32* 31*   BUN 12 9   CREATININE 0.7 0.6   CALCIUM 8.9 8.7   , CBC:   Recent Labs   Lab 05/09/19  0455 05/10/19  0507   WBC 12.51 9.36   HGB 8.2* 7.8*   HCT 24.9* 25.2*    271   , LFTs:   No results for input(s): ALT, AST, ALKPHOS, BILITOT, PROT, ALBUMIN in the last 48 hours. and All pertinent labs from the last 24 hours have been reviewed.    Diagnostic Results:  I have reviewed all pertinent imaging results/findings within the past 24 hours.    Assessment/Plan:     * Breast mass, right  May 8, 2019  --General surgery consult for breast mass biopsy. Gen surg would like to r/o sepsis prior to proceeding  --I spoke with Dr. Cunha, Rad/Onc who agree to see patient to determine possible radiation options  --CT Chest/Abdomen/Pelvis concerning for metastatic disease. Likely more than one primary. We will need biopsy results and further staging to determine treatment recommendations  --hemoglobin 8.1. No urgent need for additional blood transfusion. Consider PRBCs transfusion for hg < 8. WBC 20.1, likely reactive. No clear infectious source identified. ID consulted. Await ID recs  --BMP unremarkable  --Continue supportive care    May 9, 2019-Unable to perform breast biopsy/imaging inpatient, however it has been arranged for patient to be transferred to [facility] outpatient clinic to have biopsy performed today. She will then transfer back to hospital to complete radiation x 5 days to the right chest wall per  Rad/Onc in hopes to control bleeding from right breast mass. Patient will then need to be arranged for outpatient follow up for further treatment recommendations. Hemoglobin 8.2. No urgent indication for blood transfusion. Transfuse if hemoglobin <8. CBC otherwise unremarkable. We will hold off on Kidney mass workup as this can be done outpatient. Patient reports known history of renal mass since 2014. This is likely slow growing and will likely not be an acute issue for this patient. Patient with known difficult social situation. She is likely to need care closer to her home town which is in [place], [place]. No family here in [place] that can provide her with living arrangements. Recommend social work consult to see if they could be of any assistance to patient.     May 10, 2019--Patient did have bilateral breast US and bilateral mammogram on yesterday at [facility]. Highly suspicious for malignancy. Needs biopsies. IR consult placed today for possible axillary node biopsy and renal mass biopsy to help expedite diagnosing and treatment options for patient. Patient has received 1 of 5 planned inpatient radiation treatment in hopes to control bleeding of right breast tumor. Hemoglobin 7.8. Patient asymptomatic. No urgent need for blood transfusion. Transfuse if hemoglobin < 7 or anemia symptoms. Patient plans to seek follow up care post discharge with Oncologist near home who previously treated her mother for cancer. She is unsure of the MDs exact name. We will be happy to assist with transferring records as necessary    Left renal mass  May 8, 2019  --Outpatient consult to Urology for further workup. No urgent need for inpatient biopsy. Likely slow growing    May 10, 2019  --Patient will be inpatient next few days for radiation for bleeding control.  Consult IR for possible needle biopsy    Leukocytosis  May 8, 2019  --WBC trending downward. No clear etiology thus far. ID consulted. General surgery would like to r/o  sepsis prior to perform biopsy. Continue empiric IV abx    May 10, 2019  Resolved         Thank you for your consult. I will follow-up with patient. Please contact us if you have any additional questions.     Blaire Arguello NP  Hematology/Oncology  [facility]

## 2019-05-10 NOTE — SUBJECTIVE & OBJECTIVE
Interval History:  Patient underwent breast imaging.  Concerning for bilateral breast cancer with right axillary adenopathy    Radiation treatment to the  mass on the right breast has begun    Medications:  Continuous Infusions:  Scheduled Meds:   furosemide  40 mg Oral Daily    potassium chloride  20 mEq Oral Daily     PRN Meds:HYDROcodone-acetaminophen, ramelteon     Review of patient's allergies indicates:  No Known Allergies  Objective:     Vital Signs (Most Recent):  Temp: 97.3 °F (36.3 °C) (05/10/19 0745)  Pulse: 74 (05/10/19 0912)  Resp: 18 (05/10/19 0912)  BP: 126/63 (05/10/19 0745)  SpO2: 95 % (05/10/19 0745) Vital Signs (24h Range):  Temp:  [97.3 °F (36.3 °C)-98.4 °F (36.9 °C)] 97.3 °F (36.3 °C)  Pulse:  [73-78] 74  Resp:  [18] 18  SpO2:  [93 %-100 %] 95 %  BP: (120-132)/(58-64) 126/63     Weight: (!) 149.2 kg (329 lb)  Body mass index is 53.1 kg/m².    Intake/Output - Last 3 Shifts       05/08 0700 - 05/09 0659 05/09 0700 - 05/10 0659 05/10 0700 - 05/11 0659    P.O. 600 600     IV Piggyback 1100      Total Intake(mL/kg) 1700 (11.4) 600 (4)     Urine (mL/kg/hr) 4000 (1.1) 300 (0.1)     Stool       Total Output 4000 300     Net -2300 +300            Urine Occurrence  5 x           Physical Exam   Constitutional: No distress.   Morbidly obese   HENT:   Head: Normocephalic.   Neck: Neck supple.   Cardiovascular: Normal rate, regular rhythm and normal heart sounds.   Pulmonary/Chest: Effort normal and breath sounds normal.   Abdominal: Soft. Bowel sounds are normal. She exhibits no distension. There is no tenderness.   Obese   Skin:   Previous breast exam showed a mass in the central region of the left breast.  There is a mass in the central reason of the right breast as well as a large mass on the external surface of the breast.  There is right axillary adenopathy             Significant Diagnostics:    Mammogram and breast ultrasound were reviewed

## 2019-05-10 NOTE — ASSESSMENT & PLAN NOTE
May 8, 2019  --General surgery consult for breast mass biopsy. Gen surg would like to r/o sepsis prior to proceeding  --I spoke with Dr. Cunha, Rad/Onc who agree to see patient to determine possible radiation options  --CT Chest/Abdomen/Pelvis concerning for metastatic disease. Likely more than one primary. We will need biopsy results and further staging to determine treatment recommendations  --hemoglobin 8.1. No urgent need for additional blood transfusion. Consider PRBCs transfusion for hg < 8. WBC 20.1, likely reactive. No clear infectious source identified. ID consulted. Await ID recs  --BMP unremarkable  --Continue supportive care    May 9, 2019-Unable to perform breast biopsy/imaging inpatient, however it has been arranged for patient to be transferred to [facility] outpatient clinic to have biopsy performed today. She will then transfer back to hospital to complete radiation x 5 days to the right chest wall per Rad/Onc in hopes to control bleeding from right breast mass. Patient will then need to be arranged for outpatient follow up for further treatment recommendations. Hemoglobin 8.2. No urgent indication for blood transfusion. Transfuse if hemoglobin <8. CBC otherwise unremarkable. We will hold off on Kidney mass workup as this can be done outpatient. Patient reports known history of renal mass since 2014. This is likely slow growing and will likely not be an acute issue for this patient. Patient with known difficult social situation. She is likely to need care closer to her home town which is in [place], [place]. No family here in [place] that can provide her with living arrangements. Recommend social work consult to see if they could be of any assistance to patient.     May 10, 2019--Patient did have bilateral breast US and bilateral mammogram on yesterday at [facility]. Highly suspicious for malignancy. Needs biopsies. IR consult placed today for possible axillary node biopsy and renal mass biopsy to help  expedite diagnosing and treatment options for patient. Patient has received 1 of 5 planned inpatient radiation treatment in hopes to control bleeding of right breast tumor. Hemoglobin 7.8. Patient asymptomatic. No urgent need for blood transfusion. Transfuse if hemoglobin < 7 or anemia symptoms. Patient plans to seek follow up care post discharge with Oncologist near home who previously treated her mother for cancer. She is unsure of the MDs exact name. We will be happy to assist with transferring records as necessary

## 2019-05-10 NOTE — ASSESSMENT & PLAN NOTE
May 8, 2019  --WBC trending downward. No clear etiology thus far. ID consulted. General surgery would like to r/o sepsis prior to perform biopsy. Continue empiric IV abx    May 10, 2019  Resolved

## 2019-05-10 NOTE — ASSESSMENT & PLAN NOTE
--concerning for malignancy. CT at previous facility showed mass involving bilateral breast, lung, and bone. Will order CT abdomen Pelvis for further evaluation  --Oncology consulted  --General Surgery consulted for tissue biopsy    5/8/19 plans to perform biopsy once sepsis rule out or treatment  5/9/19 - per General Surgery - had diagnostic mammogram and U/S today in clinic. Needs biopsy of right sided lymph nodes  Will have radiation to inflammatory breast area prior to going home to  [place], [place] (near [place]). Oncology will arrange follow up  5/10 - radiation day 2, will have axillary lymph node bx and breast bx Tuesday at [facility]

## 2019-05-10 NOTE — PLAN OF CARE
Problem: Adult Inpatient Plan of Care  Goal: Plan of Care Review  Outcome: Ongoing (interventions implemented as appropriate)  Remains free from injury, pain controlled with prn pain medication, radiation treatments continue, 12 hour chart check completed, will continue to monitor

## 2019-05-10 NOTE — ASSESSMENT & PLAN NOTE
Had diagnostic mammogram at clinic today  Impression:  Left  Mass: Left breast 57 mm x 48 mm mass at the central position. Assessment: 4C - Suspicious finding. Biopsy is recommended.      Right  Mass: Right breast mass at the central position. Assessment: 5 - Highly suggestive of malignancy. Biopsy is recommended.      BI-RADS Category:   Overall: 5 - Highly Suggestive of Malignancy

## 2019-05-10 NOTE — ASSESSMENT & PLAN NOTE
May 8, 2019  --Outpatient consult to Urology for further workup. No urgent need for inpatient biopsy. Likely slow growing    May 10, 2019  --Patient will be inpatient next few days for radiation for bleeding control.  Consult IR for possible needle biopsy

## 2019-05-10 NOTE — SUBJECTIVE & OBJECTIVE
Interval History: Pt is tearful today. She says she is sad for her daughter. Has pain to the right breast, taking prn analgesia. No other complaints voiced.     Review of Systems   Constitutional: Negative for activity change, diaphoresis, fatigue and unexpected weight change.   HENT: Negative for congestion, ear pain and sore throat.    Eyes: Negative.    Respiratory: Negative for shortness of breath and wheezing.    Cardiovascular: Positive for leg swelling. Negative for chest pain and palpitations.   Gastrointestinal: Negative for abdominal pain, constipation, diarrhea and vomiting.   Endocrine: Negative.    Genitourinary: Negative for flank pain, hematuria and urgency.   Musculoskeletal: Positive for myalgias. Negative for joint swelling and neck pain.   Skin: Positive for wound. Negative for pallor.        Bleeding right breast mass     Neurological: Negative for seizures, syncope and light-headedness.   Hematological: Negative.    Psychiatric/Behavioral: Negative.      Objective:     Vital Signs (Most Recent):  Temp: 98 °F (36.7 °C) (05/10/19 1234)  Pulse: 68 (05/10/19 1234)  Resp: 18 (05/10/19 1234)  BP: 129/60 (05/10/19 1234)  SpO2: 97 % (05/10/19 1234) Vital Signs (24h Range):  Temp:  [97.3 °F (36.3 °C)-98.4 °F (36.9 °C)] 98 °F (36.7 °C)  Pulse:  [68-78] 68  Resp:  [18] 18  SpO2:  [93 %-100 %] 97 %  BP: (120-132)/(58-64) 129/60     Weight: (!) 149.2 kg (329 lb)  Body mass index is 53.1 kg/m².    Intake/Output Summary (Last 24 hours) at 5/10/2019 1401  Last data filed at 5/9/2019 1800  Gross per 24 hour   Intake 360 ml   Output 300 ml   Net 60 ml      Physical Exam   Constitutional: She is oriented to person, place, and time. She appears well-developed and well-nourished.        HENT:   Head: Normocephalic and atraumatic.   Eyes: Conjunctivae and EOM are normal.   Neck: Normal range of motion. Neck supple.   Cardiovascular: Normal rate, regular rhythm and normal heart sounds.   No murmur  heard.  Pulmonary/Chest: Effort normal and breath sounds normal. No respiratory distress.        Abdominal: Soft. Bowel sounds are normal. She exhibits no distension. There is no tenderness.   Protuberant - obese     Musculoskeletal: Normal range of motion. She exhibits no edema.   Neurological: She is alert and oriented to person, place, and time.   Skin: Skin is warm and dry.   Venous stasis changes without signs of infection.    Right protruding vascular breast mass. Left palpable breast mass.  - dressing intact to right breast   Psychiatric: Her mood appears anxious. She exhibits a depressed mood.   Vitals reviewed.      Significant Labs:   CBC:   Recent Labs   Lab 05/09/19 0455 05/10/19  0507   WBC 12.51 9.36   HGB 8.2* 7.8*   HCT 24.9* 25.2*    271     CMP:   Recent Labs   Lab 05/09/19 0455 05/10/19  0507   * 137   K 3.5 3.9   CL 95 98   CO2 32* 31*   * 123*   BUN 12 9   CREATININE 0.7 0.6   CALCIUM 8.9 8.7   ANIONGAP 8 8   EGFRNONAA >60 >60     All pertinent labs within the past 24 hours have been reviewed.    Significant Imaging: I have reviewed all pertinent imaging results/findings within the past 24 hours.

## 2019-05-10 NOTE — ASSESSMENT & PLAN NOTE
--normocytic anemia s/p 2 units PRBC's   --could be related to malignancy  --has never had colonoscopy    5/9/19  Hemoglobin 8.2. Down from 9. Closely monitor  5/10 - Hgb 7.8, if continues to fall, may need blood transfusion if continues to fall

## 2019-05-10 NOTE — PROGRESS NOTES
"[facility] Hospital Medicine  Progress Note    Patient Name: Isadora Colin  MRN: [id number]  Patient Class: IP- Inpatient   Admission Date: 5/7/2019  Length of Stay: 3 days  Attending Physician: Sagar Cloud, *  Primary Care Provider: JOLENE PHIPPS III, MD        Subjective:     Principal Problem:Breast mass, right    HPI:  Isadora Colin is a 58 year old female with history of morbid obesity, peripheral edema,chronic venous stasis dermatitis who presented to [facility] on May 6, 2018 for further evaluation of bleeding right breast mass. She recalls noticing the breast mass around 4 weeks ago or "so". This was never evaluated by a physician. She denies fever, abdominal pain, chills, or weight loss. In ED, she was noted hypotensive (79 systolic) without tachycardia which responded to IVF's. Labs reflected lactic acidosis, leukocytosis(23k), and normocytic anemia. Lactic acidosis resolved after IVF's.  CT Chest revealed 8,3 cm right breast mass, 4.7 cm left breast mass, mass involving right chest wall and rib, and surrounding lymph node involvement. She was treated empirically with Zosyn and Vancomycin empirically. Imaging negative for infectious process. [facility] contacted for transfer for further sepsis work up and Oncology consult. She had not further bleeding from protruding breast mass. She has received 2 units of blood. She has never had a mammogram or colonoscopy. She takes Lasix for swelling, but denies having an Echocardiogram.                     Hospital Course:  Isadora Canas is a 58 year old female who was admitted to [facility] for further evaluation of right breast mass and leukocytosis. CT at previous facility shows 8 cm right breast mass, 4 cm left breast mass, and lung/bone/lymph node involvement suspicious for metastatic disease. Oncology consulted who recommended general Surgery consult for biopsy of breast mass. General Surgery " will perform biopsy once she is clinically stable and sepsis rule out or treated. CT abdomen Pelvis now shows 5.4 cm renal mass. Will need outpatient biopsy for this. For leukocytosis, patient was empirically treated with IV Vancomycin and Zosyn. CT chest did not suggest infection. Repeat labs 30k leukocytosis and now down to 20k. She will continue Cefepime and Vancomycin empirically. Infectious Disease consulted. 5/9 - pt took leave and had mammogram and U/S and has returned. WBC normalized and ID suspected leukocytosis was of inflammatory origin. Antibiotics discontinued. Had markings for radiation and will have initial radiation treatments to right breast to prevent further bleeding. Right axillary lymph node biopsy pending. Oncology will arrange follow up appointments in [facility] as pt will discharge home and continue treatments/diagnostic work up there. 5/10 - Radiation treatments in progress for breast mass. Pt will have renal mass biopsy by IR on Monday 5/13 and on 5/14 will have breast/lymph node biopsy at clinic then discharge to home.        Interval History: Pt is tearful today. She says she is sad for her daughter. Has pain to the right breast, taking prn analgesia. No other complaints voiced.     Review of Systems   Constitutional: Negative for activity change, diaphoresis, fatigue and unexpected weight change.   HENT: Negative for congestion, ear pain and sore throat.    Eyes: Negative.    Respiratory: Negative for shortness of breath and wheezing.    Cardiovascular: Positive for leg swelling. Negative for chest pain and palpitations.   Gastrointestinal: Negative for abdominal pain, constipation, diarrhea and vomiting.   Endocrine: Negative.    Genitourinary: Negative for flank pain, hematuria and urgency.   Musculoskeletal: Positive for myalgias. Negative for joint swelling and neck pain.   Skin: Positive for wound. Negative for pallor.        Bleeding right breast mass     Neurological:  Negative for seizures, syncope and light-headedness.   Hematological: Negative.    Psychiatric/Behavioral: Negative.      Objective:     Vital Signs (Most Recent):  Temp: 98 °F (36.7 °C) (05/10/19 1234)  Pulse: 68 (05/10/19 1234)  Resp: 18 (05/10/19 1234)  BP: 129/60 (05/10/19 1234)  SpO2: 97 % (05/10/19 1234) Vital Signs (24h Range):  Temp:  [97.3 °F (36.3 °C)-98.4 °F (36.9 °C)] 98 °F (36.7 °C)  Pulse:  [68-78] 68  Resp:  [18] 18  SpO2:  [93 %-100 %] 97 %  BP: (120-132)/(58-64) 129/60     Weight: (!) 149.2 kg (329 lb)  Body mass index is 53.1 kg/m².    Intake/Output Summary (Last 24 hours) at 5/10/2019 1401  Last data filed at 5/9/2019 1800  Gross per 24 hour   Intake 360 ml   Output 300 ml   Net 60 ml      Physical Exam   Constitutional: She is oriented to person, place, and time. She appears well-developed and well-nourished.        HENT:   Head: Normocephalic and atraumatic.   Eyes: Conjunctivae and EOM are normal.   Neck: Normal range of motion. Neck supple.   Cardiovascular: Normal rate, regular rhythm and normal heart sounds.   No murmur heard.  Pulmonary/Chest: Effort normal and breath sounds normal. No respiratory distress.        Abdominal: Soft. Bowel sounds are normal. She exhibits no distension. There is no tenderness.   Protuberant - obese     Musculoskeletal: Normal range of motion. She exhibits no edema.   Neurological: She is alert and oriented to person, place, and time.   Skin: Skin is warm and dry.   Venous stasis changes without signs of infection.    Right protruding vascular breast mass. Left palpable breast mass.  - dressing intact to right breast   Psychiatric: Her mood appears anxious. She exhibits a depressed mood.   Vitals reviewed.      Significant Labs:   CBC:   Recent Labs   Lab 05/09/19  0455 05/10/19  0507   WBC 12.51 9.36   HGB 8.2* 7.8*   HCT 24.9* 25.2*    271     CMP:   Recent Labs   Lab 05/09/19  0455 05/10/19  0507   * 137   K 3.5 3.9   CL 95 98   CO2 32* 31*   GLU  119* 123*   BUN 12 9   CREATININE 0.7 0.6   CALCIUM 8.9 8.7   ANIONGAP 8 8   EGFRNONAA >60 >60     All pertinent labs within the past 24 hours have been reviewed.    Significant Imaging: I have reviewed all pertinent imaging results/findings within the past 24 hours.    Assessment/Plan:      * Breast mass, right  --concerning for malignancy. CT at previous facility showed mass involving bilateral breast, lung, and bone. Will order CT abdomen Pelvis for further evaluation  --Oncology consulted  --General Surgery consulted for tissue biopsy    5/8/19 plans to perform biopsy once sepsis rule out or treatment  5/9/19 - per General Surgery - had diagnostic mammogram and U/S today in clinic. Needs biopsy of right sided lymph nodes  Will have radiation to inflammatory breast area prior to going home to  [place] (near [place]). Oncology will arrange follow up  5/10 - radiation day 2, will have axillary lymph node bx and breast bx Tuesday at [facility]          Abnormal chest CT        Breast mass, left  Had diagnostic mammogram at clinic today  Impression:  Left  Mass: Left breast 57 mm x 48 mm mass at the central position. Assessment: 4C - Suspicious finding. Biopsy is recommended.      Right  Mass: Right breast mass at the central position. Assessment: 5 - Highly suggestive of malignancy. Biopsy is recommended.      BI-RADS Category:   Overall: 5 - Highly Suggestive of Malignancy           Left renal mass  5.4 cm mass  Likely a different primary  Will need outpatient biopsy  According to patient - she was aware of kidney mass back from 2014      Venous stasis dermatitis of both lower extremities  --No signs of infection  --daily skin care    Morbid obesity  Counseled on need for weight loss      Leukocytosis  -infectious etiology not found  --reactive vs ?sepsis which is unlikely  --CT Chest negative for infectious process. CT abdomen for further evaluation  --empiric Cefepime and Vancomycin    5/8/19  30k>>20k  today  Unlikely sepsis  Empiric Cefepime and Vancomycin  Infectious Disease consulted  5/9 - Resolved - per ID leukocytosis was inflammatory, ABX discontinued      Acute blood loss anemia  --normocytic anemia s/p 2 units PRBC's   --could be related to malignancy  --has never had colonoscopy    5/9/19  Hemoglobin 8.2. Down from 9. Closely monitor  5/10 - Hgb 7.8, if continues to fall, may need blood transfusion if continues to fall      VTE Risk Mitigation (From admission, onward)    None              Luli Lancaster, NP  Department of Hospital Medicine   [facility]

## 2019-05-11 LAB
ANION GAP SERPL CALC-SCNC: 9 MMOL/L (ref 8–16)
ANISOCYTOSIS BLD QL SMEAR: SLIGHT
BASOPHILS # BLD AUTO: 0.02 K/UL (ref 0–0.2)
BASOPHILS NFR BLD: 0.2 % (ref 0–1.9)
BUN SERPL-MCNC: 11 MG/DL (ref 6–20)
BURR CELLS BLD QL SMEAR: ABNORMAL
CALCIUM SERPL-MCNC: 9.3 MG/DL (ref 8.7–10.5)
CHLORIDE SERPL-SCNC: 97 MMOL/L (ref 95–110)
CO2 SERPL-SCNC: 32 MMOL/L (ref 23–29)
CREAT SERPL-MCNC: 0.6 MG/DL (ref 0.5–1.4)
DACRYOCYTES BLD QL SMEAR: ABNORMAL
DIFFERENTIAL METHOD: ABNORMAL
EOSINOPHIL # BLD AUTO: 0.2 K/UL (ref 0–0.5)
EOSINOPHIL NFR BLD: 2.2 % (ref 0–8)
ERYTHROCYTE [DISTWIDTH] IN BLOOD BY AUTOMATED COUNT: 15 % (ref 11.5–14.5)
EST. GFR  (AFRICAN AMERICAN): >60 ML/MIN/1.73 M^2
EST. GFR  (NON AFRICAN AMERICAN): >60 ML/MIN/1.73 M^2
GLUCOSE SERPL-MCNC: 108 MG/DL (ref 70–110)
HCT VFR BLD AUTO: 28.7 % (ref 37–48.5)
HGB BLD-MCNC: 8.7 G/DL (ref 12–16)
LYMPHOCYTES # BLD AUTO: 1 K/UL (ref 1–4.8)
LYMPHOCYTES NFR BLD: 9.7 % (ref 18–48)
MCH RBC QN AUTO: 27.6 PG (ref 27–31)
MCHC RBC AUTO-ENTMCNC: 30.3 G/DL (ref 32–36)
MCV RBC AUTO: 91 FL (ref 82–98)
MONOCYTES # BLD AUTO: 0.9 K/UL (ref 0.3–1)
MONOCYTES NFR BLD: 8.4 % (ref 4–15)
NEUTROPHILS # BLD AUTO: 8.1 K/UL (ref 1.8–7.7)
NEUTROPHILS NFR BLD: 81.2 % (ref 38–73)
OVALOCYTES BLD QL SMEAR: ABNORMAL
PLATELET # BLD AUTO: 357 K/UL (ref 150–350)
PMV BLD AUTO: 9.8 FL (ref 9.2–12.9)
POIKILOCYTOSIS BLD QL SMEAR: SLIGHT
POLYCHROMASIA BLD QL SMEAR: ABNORMAL
POTASSIUM SERPL-SCNC: 4 MMOL/L (ref 3.5–5.1)
RBC # BLD AUTO: 3.15 M/UL (ref 4–5.4)
SODIUM SERPL-SCNC: 138 MMOL/L (ref 136–145)
WBC # BLD AUTO: 10.12 K/UL (ref 3.9–12.7)

## 2019-05-11 PROCEDURE — 99233 SBSQ HOSP IP/OBS HIGH 50: CPT | Mod: ,,, | Performed by: INTERNAL MEDICINE

## 2019-05-11 PROCEDURE — 99233 PR SUBSEQUENT HOSPITAL CARE,LEVL III: ICD-10-PCS | Mod: ,,, | Performed by: INTERNAL MEDICINE

## 2019-05-11 PROCEDURE — 85025 COMPLETE CBC W/AUTO DIFF WBC: CPT

## 2019-05-11 PROCEDURE — 94760 N-INVAS EAR/PLS OXIMETRY 1: CPT

## 2019-05-11 PROCEDURE — 11000001 HC ACUTE MED/SURG PRIVATE ROOM

## 2019-05-11 PROCEDURE — 25000003 PHARM REV CODE 250: Performed by: NURSE PRACTITIONER

## 2019-05-11 PROCEDURE — 36415 COLL VENOUS BLD VENIPUNCTURE: CPT

## 2019-05-11 PROCEDURE — 80048 BASIC METABOLIC PNL TOTAL CA: CPT

## 2019-05-11 RX ADMIN — HYDROCODONE BITARTRATE AND ACETAMINOPHEN 1 TABLET: 10; 325 TABLET ORAL at 10:05

## 2019-05-11 RX ADMIN — HYDROCODONE BITARTRATE AND ACETAMINOPHEN 1 TABLET: 10; 325 TABLET ORAL at 04:05

## 2019-05-11 RX ADMIN — POTASSIUM CHLORIDE 20 MEQ: 1500 TABLET, EXTENDED RELEASE ORAL at 08:05

## 2019-05-11 RX ADMIN — FUROSEMIDE 40 MG: 40 TABLET ORAL at 08:05

## 2019-05-11 NOTE — ASSESSMENT & PLAN NOTE
--normocytic anemia s/p 2 units PRBC's   --could be related to malignancy  --has never had colonoscopy    5/9/19  Hemoglobin 8.2. Down from 9. Closely monitor  5/10 - Hgb 7.8, if continues to fall, may need blood transfusion if continues to fall  5/11 - Hgb 8.7

## 2019-05-11 NOTE — ASSESSMENT & PLAN NOTE
There are several scattered tiny right upper lobe pulmonary nodules measuring up to 6 mm on axial image 150 of series 3 highly suspicious for metastatic disease

## 2019-05-11 NOTE — PLAN OF CARE
Problem: Adult Inpatient Plan of Care  Goal: Absence of Hospital-Acquired Illness or Injury  Outcome: Ongoing (interventions implemented as appropriate)  Intervention: Prevent Skin Injury  Encouraged patient to shift weight every two hours and reposition self  Intervention: Prevent Infection  Aseptic technique maintained and handwashing.    Goal: Optimal Comfort and Wellbeing  Outcome: Ongoing (interventions implemented as appropriate)  Intervention: Monitor Pain and Promote Comfort  Patient voice no complaints of pain or discomfort at this time.      Comments: Chart reviewed. No signs or symptoms of acute distress noted at this time will continue to monitor.

## 2019-05-11 NOTE — SUBJECTIVE & OBJECTIVE
Interval History: Leg swelling resolved. Awaiting radiation treatments and biopsies. No complaints currently.     Review of Systems   Constitutional: Negative for activity change, diaphoresis, fatigue and unexpected weight change.   HENT: Negative for congestion, ear pain and sore throat.    Eyes: Negative.    Respiratory: Negative for shortness of breath and wheezing.    Cardiovascular: Positive for leg swelling. Negative for chest pain and palpitations.   Gastrointestinal: Negative for abdominal pain, constipation, diarrhea and vomiting.   Endocrine: Negative.    Genitourinary: Negative for flank pain, hematuria and urgency.   Musculoskeletal: Positive for myalgias. Negative for joint swelling and neck pain.   Skin: Positive for wound. Negative for pallor.        Bleeding right breast mass     Neurological: Negative for seizures, syncope and light-headedness.   Hematological: Negative.    Psychiatric/Behavioral: Negative.      Objective:     Vital Signs (Most Recent):  Temp: 98.5 °F (36.9 °C) (05/11/19 1211)  Pulse: 77 (05/11/19 1211)  Resp: 18 (05/11/19 1211)  BP: 134/61 (05/11/19 1211)  SpO2: (!) 92 % (05/11/19 1211) Vital Signs (24h Range):  Temp:  [97.7 °F (36.5 °C)-99.2 °F (37.3 °C)] 98.5 °F (36.9 °C)  Pulse:  [72-79] 77  Resp:  [15-18] 18  SpO2:  [92 %-100 %] 92 %  BP: (104-134)/(47-64) 134/61     Weight: (!) 149.2 kg (329 lb)  Body mass index is 53.1 kg/m².    Intake/Output Summary (Last 24 hours) at 5/11/2019 1242  Last data filed at 5/11/2019 0600  Gross per 24 hour   Intake 480 ml   Output --   Net 480 ml      Physical Exam   Constitutional: She is oriented to person, place, and time. She appears well-developed and well-nourished.        HENT:   Head: Normocephalic and atraumatic.   Eyes: Conjunctivae and EOM are normal.   Neck: Normal range of motion. Neck supple.   Cardiovascular: Normal rate, regular rhythm and normal heart sounds.   No murmur heard.  Pulmonary/Chest: Effort normal. No respiratory  distress.   Mildly course, rhonchi/wheezing     Abdominal: Soft. Bowel sounds are normal. She exhibits no distension. There is no tenderness.   Protuberant - obese     Musculoskeletal: Normal range of motion. She exhibits no edema.   Neurological: She is alert and oriented to person, place, and time.   Skin: Skin is warm and dry.   Venous stasis changes without signs of infection.    Right protruding vascular breast mass. Left palpable breast mass.  - dressing intact to right breast   Psychiatric: Her mood appears anxious. She exhibits a depressed mood.   Vitals reviewed.      Significant Labs:   CBC:   Recent Labs   Lab 05/10/19  0507 05/11/19 0434   WBC 9.36 10.12   HGB 7.8* 8.7*   HCT 25.2* 28.7*    357*     CMP:   Recent Labs   Lab 05/10/19  0507 05/11/19  0434    138   K 3.9 4.0   CL 98 97   CO2 31* 32*   * 108   BUN 9 11   CREATININE 0.6 0.6   CALCIUM 8.7 9.3   ANIONGAP 8 9   EGFRNONAA >60 >60     All pertinent labs within the past 24 hours have been reviewed.    Significant Imaging: I have reviewed all pertinent imaging results/findings within the past 24 hours.

## 2019-05-11 NOTE — PROGRESS NOTES
"[facility] Hospital Medicine  Progress Note    Patient Name: Isadora Colin  MRN: [id number]  Patient Class: IP- Inpatient   Admission Date: 5/7/2019  Length of Stay: 4 days  Attending Physician: Danial Price MD  Primary Care Provider: JOLENE PHIPPS III, MD        Subjective:     Principal Problem:Breast mass, right    HPI:  Isadora Colin is a 58 year old female with history of morbid obesity, peripheral edema,chronic venous stasis dermatitis who presented to [facility] on May 6, 2018 for further evaluation of bleeding right breast mass. She recalls noticing the breast mass around 4 weeks ago or "so". This was never evaluated by a physician. She denies fever, abdominal pain, chills, or weight loss. In ED, she was noted hypotensive (79 systolic) without tachycardia which responded to IVF's. Labs reflected lactic acidosis, leukocytosis(23k), and normocytic anemia. Lactic acidosis resolved after IVF's.  CT Chest revealed 8,3 cm right breast mass, 4.7 cm left breast mass, mass involving right chest wall and rib, and surrounding lymph node involvement. She was treated empirically with Zosyn and Vancomycin empirically. Imaging negative for infectious process. [facility] contacted for transfer for further sepsis work up and Oncology consult. She had not further bleeding from protruding breast mass. She has received 2 units of blood. She has never had a mammogram or colonoscopy. She takes Lasix for swelling, but denies having an Echocardiogram.                     Hospital Course:  Isadora Canas is a 58 year old female who was admitted to [facility] for further evaluation of right breast mass and leukocytosis. CT at previous facility shows 8 cm right breast mass, 4 cm left breast mass, and lung/bone/lymph node involvement suspicious for metastatic disease. Oncology consulted who recommended general Surgery consult for biopsy of breast mass. General Surgery " will perform biopsy once she is clinically stable and sepsis rule out or treated. CT abdomen Pelvis now shows 5.4 cm renal mass. Will need outpatient biopsy for this. For leukocytosis, patient was empirically treated with IV Vancomycin and Zosyn. CT chest did not suggest infection. Repeat labs 30k leukocytosis and now down to 20k. She will continue Cefepime and Vancomycin empirically. Infectious Disease consulted. 5/9 - pt took leave and had mammogram and U/S and has returned. WBC normalized and ID suspected leukocytosis was of inflammatory origin. Antibiotics discontinued. Had markings for radiation and will have initial radiation treatments to right breast to prevent further bleeding. Right axillary lymph node biopsy pending. Oncology will arrange follow up appointments in [facility] as pt will discharge home and continue treatments/diagnostic work up there. 5/10 - Radiation treatments in progress for breast mass. Pt will have renal mass biopsy by IR on Monday 5/13 and on 5/14 will have breast/lymph node biopsy at clinic then discharge to home.        Interval History: Leg swelling resolved. Awaiting radiation treatments and biopsies. No complaints currently.     Review of Systems   Constitutional: Negative for activity change, diaphoresis, fatigue and unexpected weight change.   HENT: Negative for congestion, ear pain and sore throat.    Eyes: Negative.    Respiratory: Negative for shortness of breath and wheezing.    Cardiovascular: Positive for leg swelling. Negative for chest pain and palpitations.   Gastrointestinal: Negative for abdominal pain, constipation, diarrhea and vomiting.   Endocrine: Negative.    Genitourinary: Negative for flank pain, hematuria and urgency.   Musculoskeletal: Positive for myalgias. Negative for joint swelling and neck pain.   Skin: Positive for wound. Negative for pallor.        Bleeding right breast mass     Neurological: Negative for seizures, syncope and light-headedness.    Hematological: Negative.    Psychiatric/Behavioral: Negative.      Objective:     Vital Signs (Most Recent):  Temp: 98.5 °F (36.9 °C) (05/11/19 1211)  Pulse: 77 (05/11/19 1211)  Resp: 18 (05/11/19 1211)  BP: 134/61 (05/11/19 1211)  SpO2: (!) 92 % (05/11/19 1211) Vital Signs (24h Range):  Temp:  [97.7 °F (36.5 °C)-99.2 °F (37.3 °C)] 98.5 °F (36.9 °C)  Pulse:  [72-79] 77  Resp:  [15-18] 18  SpO2:  [92 %-100 %] 92 %  BP: (104-134)/(47-64) 134/61     Weight: (!) 149.2 kg (329 lb)  Body mass index is 53.1 kg/m².    Intake/Output Summary (Last 24 hours) at 5/11/2019 1242  Last data filed at 5/11/2019 0600  Gross per 24 hour   Intake 480 ml   Output --   Net 480 ml      Physical Exam   Constitutional: She is oriented to person, place, and time. She appears well-developed and well-nourished.        HENT:   Head: Normocephalic and atraumatic.   Eyes: Conjunctivae and EOM are normal.   Neck: Normal range of motion. Neck supple.   Cardiovascular: Normal rate, regular rhythm and normal heart sounds.   No murmur heard.  Pulmonary/Chest: Effort normal. No respiratory distress.   Mildly course, rhonchi/wheezing     Abdominal: Soft. Bowel sounds are normal. She exhibits no distension. There is no tenderness.   Protuberant - obese     Musculoskeletal: Normal range of motion. She exhibits no edema.   Neurological: She is alert and oriented to person, place, and time.   Skin: Skin is warm and dry.   Venous stasis changes without signs of infection.    Right protruding vascular breast mass. Left palpable breast mass.  - dressing intact to right breast   Psychiatric: Her mood appears anxious. She exhibits a depressed mood.   Vitals reviewed.      Significant Labs:   CBC:   Recent Labs   Lab 05/10/19  0507 05/11/19  0434   WBC 9.36 10.12   HGB 7.8* 8.7*   HCT 25.2* 28.7*    357*     CMP:   Recent Labs   Lab 05/10/19  0507 05/11/19  0434    138   K 3.9 4.0   CL 98 97   CO2 31* 32*   * 108   BUN 9 11   CREATININE 0.6  0.6   CALCIUM 8.7 9.3   ANIONGAP 8 9   EGFRNONAA >60 >60     All pertinent labs within the past 24 hours have been reviewed.    Significant Imaging: I have reviewed all pertinent imaging results/findings within the past 24 hours.    Assessment/Plan:      * Breast mass, right  --concerning for malignancy. CT at previous facility showed mass involving bilateral breast, lung, and bone. Will order CT abdomen Pelvis for further evaluation  --Oncology consulted  --General Surgery consulted for tissue biopsy    5/8/19 plans to perform biopsy once sepsis rule out or treatment  5/9/19 - per General Surgery - had diagnostic mammogram and U/S today in clinic. Needs biopsy of right sided lymph nodes  Will have radiation to inflammatory breast area prior to going home to  [place], [place] (near [place]). Oncology will arrange follow up  5/10 - radiation day 2, will have axillary lymph node bx and breast bx Tuesday at [facility]  5/11 - radiation pending          Abnormal chest CT - Pulmonary Nodules  There are several scattered tiny right upper lobe pulmonary nodules measuring up to 6 mm on axial image 150 of series 3 highly suspicious for metastatic disease      Breast mass, left  Had diagnostic mammogram at clinic t  Impression:  Left  Mass: Left breast 57 mm x 48 mm mass at the central position. Assessment: 4C - Suspicious finding. Biopsy is recommended.      Right  Mass: Right breast mass at the central position. Assessment: 5 - Highly suggestive of malignancy. Biopsy is recommended.      BI-RADS Category:   Overall: 5 - Highly Suggestive of Malignancy  5/11 - Right axillary lymph node biopsy on Tuesday           Left renal mass  5.4 cm mass  Likely a different primary  Will need outpatient biopsy  According to patient - she was aware of kidney mass back from 2014 5/11 - renal biopsy per IR on Monday      Venous stasis dermatitis of both lower extremities  --No signs of infection  --daily skin care    Morbid obesity  Counseled on  need for weight loss      Leukocytosis  -infectious etiology not found  --reactive vs ?sepsis which is unlikely  --CT Chest negative for infectious process. CT abdomen for further evaluation  --empiric Cefepime and Vancomycin    5/8/19  30k>>20k today  Unlikely sepsis  Empiric Cefepime and Vancomycin  Infectious Disease consulted  5/9 - Resolved - per ID leukocytosis was inflammatory, ABX discontinued      Acute blood loss anemia  --normocytic anemia s/p 2 units PRBC's   --could be related to malignancy  --has never had colonoscopy    5/9/19  Hemoglobin 8.2. Down from 9. Closely monitor  5/10 - Hgb 7.8, if continues to fall, may need blood transfusion if continues to fall  5/11 - Hgb 8.7      VTE Risk Mitigation (From admission, onward)    None              Luli Lancaster NP  Department of Hospital Medicine   [facility]

## 2019-05-11 NOTE — PLAN OF CARE
Problem: Adult Inpatient Plan of Care  Goal: Plan of Care Review  Outcome: Ongoing (interventions implemented as appropriate)  Remains free from harm, pain controlled via PO pain meds, refuses to reposition at most times, will continue to monitor. 24 hour chart check complete.

## 2019-05-11 NOTE — PROGRESS NOTES
[facility] -   Hematology/Oncology  Progress Note    Patient Name: Isadora Colin  Admission Date: 5/7/2019  Hospital Length of Stay: 4 days  Code Status: No Order     Subjective:     HPI:  58 y.o female history of morbid obesity, peripheral edema (on Lasix at home),chronic venous stasis dermatitis who presented to [facility] on May 6, 2018 for further evaluation of bleeding right breast mass which patient reports onset approximately 4 weeks ago and has progressively gotten larger.  Associated symptoms include right breast pain.  Patient denies any other symptoms. She denies CP, SOB, bowel or urinary complaints, fever, abdominal pain, chills, or weight loss.     At [facility] in ED, she was noted hypotensive (79 systolic) without tachycardia which responded to IVF's. Labs reflected lactic acidosis, leukocytosis(23k), and normocytic anemia Hg 9.3. Lactic acidosis resolved after IVF's.  CT Chest revealed 8.3 cm right breast mass, 4.7 cm left breast mass, mass involving right chest wall and rib, and surrounding lymph node involvement. She was treated empirically with Zosyn and Vancomycin IV abx. Imaging negative for infectious process.     Patient was transferred to [facility] for higher level care. She has received 2 units of blood. She has never had a mammogram or colonoscopy. Does not have regular PCP.     CT Abdomen/Pelvis done at [facility] revealed 5.4 cm renal mass. ID consulted for elevated WBC. General surgery consulted for mass biopsy. General surgery awaiting r/o sepsis to proceed with biopsy               Interval History: Bleeding from breast ulcer site has improved with radiation.  Mrs. Colin is comfortable.  Afebrile, Tmax 99.2.    Oncology Treatment Plan:   [No treatment plan]    Medications:  Continuous Infusions:  Scheduled Meds:   furosemide  40 mg Oral Daily    potassium chloride  20 mEq Oral Daily     PRN Meds:HYDROcodone-acetaminophen,  ramelteon     Review of Systems   Constitutional: Negative.    HENT: Negative.    Eyes: Negative.    Respiratory: Negative.    Cardiovascular: Negative.    Gastrointestinal: Negative.    Endocrine: Negative.    Genitourinary: Negative.    Musculoskeletal: Negative.    Skin: Negative.    Allergic/Immunologic: Negative.    Neurological: Negative.    Hematological: Negative.    Psychiatric/Behavioral: Negative.      Objective:     Vital Signs (Most Recent):  Temp: 97.7 °F (36.5 °C) (05/11/19 0724)  Pulse: 79 (05/11/19 0724)  Resp: 18 (05/11/19 0724)  BP: (!) 121/59 (05/11/19 0724)  SpO2: (!) 92 % (05/11/19 0724) Vital Signs (24h Range):  Temp:  [97.7 °F (36.5 °C)-99.2 °F (37.3 °C)] 97.7 °F (36.5 °C)  Pulse:  [68-79] 79  Resp:  [15-18] 18  SpO2:  [92 %-100 %] 92 %  BP: (104-131)/(47-64) 121/59     Weight: (!) 149.2 kg (329 lb)  Body mass index is 53.1 kg/m².  Body surface area is 2.64 meters squared.      Intake/Output Summary (Last 24 hours) at 5/11/2019 0929  Last data filed at 5/11/2019 0600  Gross per 24 hour   Intake 480 ml   Output --   Net 480 ml       Physical Exam   Constitutional: She is oriented to person, place, and time. She appears well-developed and well-nourished.   HENT:   Head: Normocephalic and atraumatic.   Eyes: Conjunctivae and EOM are normal.   Neck: Normal range of motion. Neck supple.   Cardiovascular: Normal rate and regular rhythm.   Pulmonary/Chest: Effort normal and breath sounds normal.   Abdominal: Soft. Bowel sounds are normal.   Musculoskeletal: Normal range of motion.   Neurological: She is alert and oriented to person, place, and time.   Skin: Skin is warm and dry.   Psychiatric: She has a normal mood and affect. Her behavior is normal. Judgment and thought content normal.   Nursing note and vitals reviewed.      Significant Labs:   All pertinent labs from the last 24 hours have been reviewed.    Diagnostic Results:  I have reviewed all pertinent imaging results/findings within the  past 24 hours.    Assessment/Plan:     * Breast mass, right  May 8, 2019  --General surgery consult for breast mass biopsy. Gen surg would like to r/o sepsis prior to proceeding  --I spoke with Dr. Cunha, Rad/Onc who agree to see patient to determine possible radiation options  --CT Chest/Abdomen/Pelvis concerning for metastatic disease. Likely more than one primary. We will need biopsy results and further staging to determine treatment recommendations  --hemoglobin 8.1. No urgent need for additional blood transfusion. Consider PRBCs transfusion for hg < 8. WBC 20.1, likely reactive. No clear infectious source identified. ID consulted. Await ID recs  --BMP unremarkable  --Continue supportive care    May 11th  CT guided biopsy of renal mass has been arranged with Radiology for Monday May 13th.  Biopsy of breast and axillary mass will be done on Tuesday May 14th in outpatient clinic at [facility].  Have asked Mrs. Colin to let me know the name of medical oncologist in [place] so we can coordinate oncology care.  Mrs. Colin would like to be treated in [place] as it is much closer to home, where she lives with her mother.  Her daughter lives in [place], but has roommates and so she cannot stay and get care in [place].    May 9, 2019-Unable to perform breast biopsy/imaging inpatient, however it has been arranged for patient to be transferred to [facility] outpatient clinic to have biopsy performed today. She will then transfer back to hospital to complete radiation x 5 days to the right chest wall per Rad/Onc in hopes to control bleeding from right breast mass. Patient will then need to be arranged for outpatient follow up for further treatment recommendations. Hemoglobin 8.2. No urgent indication for blood transfusion. Transfuse if hemoglobin <8. CBC otherwise unremarkable. We will hold off on Kidney mass workup as this can be done outpatient. Patient reports known history of renal mass since 2014. This is  likely slow growing and will likely not be an acute issue for this patient. Patient with known difficult social situation. She is likely to need care closer to her home town which is in [place], [place]. No family here in [place] that can provide her with living arrangements. Recommend social work consult to see if they could be of any assistance to patient.     May 10, 2019--Patient did have bilateral breast US and bilateral mammogram on yesterday at [facility]. Highly suspicious for malignancy. Needs biopsies. IR consult placed today for possible axillary node biopsy and renal mass biopsy to help expedite diagnosing and treatment options for patient. Patient has received 1 of 5 planned inpatient radiation treatment in hopes to control bleeding of right breast tumor. Hemoglobin 7.8. Patient asymptomatic. No urgent need for blood transfusion. Transfuse if hemoglobin < 7 or anemia symptoms. Patient plans to seek follow up care post discharge with Oncologist near home who previously treated her mother for cancer. She is unsure of the MDs exact name. We will be happy to assist with transferring records as necessary    Left renal mass  May 8, 2019  --Outpatient consult to Urology for further workup. No urgent need for inpatient biopsy. Likely slow growing    May 10, 2019  --Patient will be inpatient next few days for radiation for bleeding control.  Consult IR for possible needle biopsy    Leukocytosis  May 8, 2019  --WBC trending downward. No clear etiology thus far. ID consulted. General surgery would like to r/o sepsis prior to perform biopsy. Continue empiric IV abx    May 10, 2019  Resolved         Thank you for your consult. I will follow-up with patient. Please contact us if you have any additional questions.     Brett Pepper MD  Hematology/Oncology  [facility]

## 2019-05-11 NOTE — ASSESSMENT & PLAN NOTE
Had diagnostic mammogram at clinic t  Impression:  Left  Mass: Left breast 57 mm x 48 mm mass at the central position. Assessment: 4C - Suspicious finding. Biopsy is recommended.      Right  Mass: Right breast mass at the central position. Assessment: 5 - Highly suggestive of malignancy. Biopsy is recommended.      BI-RADS Category:   Overall: 5 - Highly Suggestive of Malignancy  5/11 - Right axillary lymph node biopsy on Tuesday

## 2019-05-11 NOTE — ASSESSMENT & PLAN NOTE
5.4 cm mass  Likely a different primary  Will need outpatient biopsy  According to patient - she was aware of kidney mass back from 2014 5/11 - renal biopsy per IR on Monday

## 2019-05-11 NOTE — SUBJECTIVE & OBJECTIVE
Interval History: Bleeding from breast ulcer site has improved with radiation.  Mrs. Colin is comfortable.  Afebrile, Tmax 99.2.    Oncology Treatment Plan:   [No treatment plan]    Medications:  Continuous Infusions:  Scheduled Meds:   furosemide  40 mg Oral Daily    potassium chloride  20 mEq Oral Daily     PRN Meds:HYDROcodone-acetaminophen, ramelteon     Review of Systems   Constitutional: Negative.    HENT: Negative.    Eyes: Negative.    Respiratory: Negative.    Cardiovascular: Negative.    Gastrointestinal: Negative.    Endocrine: Negative.    Genitourinary: Negative.    Musculoskeletal: Negative.    Skin: Negative.    Allergic/Immunologic: Negative.    Neurological: Negative.    Hematological: Negative.    Psychiatric/Behavioral: Negative.      Objective:     Vital Signs (Most Recent):  Temp: 97.7 °F (36.5 °C) (05/11/19 0724)  Pulse: 79 (05/11/19 0724)  Resp: 18 (05/11/19 0724)  BP: (!) 121/59 (05/11/19 0724)  SpO2: (!) 92 % (05/11/19 0724) Vital Signs (24h Range):  Temp:  [97.7 °F (36.5 °C)-99.2 °F (37.3 °C)] 97.7 °F (36.5 °C)  Pulse:  [68-79] 79  Resp:  [15-18] 18  SpO2:  [92 %-100 %] 92 %  BP: (104-131)/(47-64) 121/59     Weight: (!) 149.2 kg (329 lb)  Body mass index is 53.1 kg/m².  Body surface area is 2.64 meters squared.      Intake/Output Summary (Last 24 hours) at 5/11/2019 0929  Last data filed at 5/11/2019 0600  Gross per 24 hour   Intake 480 ml   Output --   Net 480 ml       Physical Exam   Constitutional: She is oriented to person, place, and time. She appears well-developed and well-nourished.   HENT:   Head: Normocephalic and atraumatic.   Eyes: Conjunctivae and EOM are normal.   Neck: Normal range of motion. Neck supple.   Cardiovascular: Normal rate and regular rhythm.   Pulmonary/Chest: Effort normal and breath sounds normal.   Abdominal: Soft. Bowel sounds are normal.   Musculoskeletal: Normal range of motion.   Neurological: She is alert and oriented to person, place, and time.    Skin: Skin is warm and dry.   Psychiatric: She has a normal mood and affect. Her behavior is normal. Judgment and thought content normal.   Nursing note and vitals reviewed.      Significant Labs:   All pertinent labs from the last 24 hours have been reviewed.    Diagnostic Results:  I have reviewed all pertinent imaging results/findings within the past 24 hours.

## 2019-05-11 NOTE — ASSESSMENT & PLAN NOTE
May 8, 2019  --General surgery consult for breast mass biopsy. Gen surg would like to r/o sepsis prior to proceeding  --I spoke with Dr. Cunha, Rad/Onc who agree to see patient to determine possible radiation options  --CT Chest/Abdomen/Pelvis concerning for metastatic disease. Likely more than one primary. We will need biopsy results and further staging to determine treatment recommendations  --hemoglobin 8.1. No urgent need for additional blood transfusion. Consider PRBCs transfusion for hg < 8. WBC 20.1, likely reactive. No clear infectious source identified. ID consulted. Await ID recs  --BMP unremarkable  --Continue supportive care    May 11th  CT guided biopsy of renal mass has been arranged with Radiology for Monday May 13th.  Biopsy of breast and axillary mass will be done on Tuesday May 14th in outpatient clinic at [facility].  Have asked Mrs. Colin to let me know the name of medical oncologist in [place] so we can coordinate oncology care.  Mrs. Colin would like to be treated in [place] as it is much closer to home, where she lives with her mother.  Her daughter lives in [place], but has roommates and so she cannot stay and get care in [place].    May 9, 2019-Unable to perform breast biopsy/imaging inpatient, however it has been arranged for patient to be transferred to [facility] outpatient clinic to have biopsy performed today. She will then transfer back to hospital to complete radiation x 5 days to the right chest wall per Rad/Onc in hopes to control bleeding from right breast mass. Patient will then need to be arranged for outpatient follow up for further treatment recommendations. Hemoglobin 8.2. No urgent indication for blood transfusion. Transfuse if hemoglobin <8. CBC otherwise unremarkable. We will hold off on Kidney mass workup as this can be done outpatient. Patient reports known history of renal mass since 2014. This is likely slow growing and will likely not be an acute issue for  this patient. Patient with known difficult social situation. She is likely to need care closer to her home town which is in [place], [place]. No family here in [place] that can provide her with living arrangements. Recommend social work consult to see if they could be of any assistance to patient.     May 10, 2019--Patient did have bilateral breast US and bilateral mammogram on yesterday at [facility]. Highly suspicious for malignancy. Needs biopsies. IR consult placed today for possible axillary node biopsy and renal mass biopsy to help expedite diagnosing and treatment options for patient. Patient has received 1 of 5 planned inpatient radiation treatment in hopes to control bleeding of right breast tumor. Hemoglobin 7.8. Patient asymptomatic. No urgent need for blood transfusion. Transfuse if hemoglobin < 7 or anemia symptoms. Patient plans to seek follow up care post discharge with Oncologist near home who previously treated her mother for cancer. She is unsure of the MDs exact name. We will be happy to assist with transferring records as necessary

## 2019-05-11 NOTE — ASSESSMENT & PLAN NOTE
--concerning for malignancy. CT at previous facility showed mass involving bilateral breast, lung, and bone. Will order CT abdomen Pelvis for further evaluation  --Oncology consulted  --General Surgery consulted for tissue biopsy    5/8/19 plans to perform biopsy once sepsis rule out or treatment  5/9/19 - per General Surgery - had diagnostic mammogram and U/S today in clinic. Needs biopsy of right sided lymph nodes  Will have radiation to inflammatory breast area prior to going home to  [place], [place] (near [place]). Oncology will arrange follow up  5/10 - radiation day 2, will have axillary lymph node bx and breast bx Tuesday at [facility]  5/11 - radiation pending

## 2019-05-12 LAB
ANION GAP SERPL CALC-SCNC: 8 MMOL/L (ref 8–16)
BASOPHILS # BLD AUTO: 0.02 K/UL (ref 0–0.2)
BASOPHILS NFR BLD: 0.2 % (ref 0–1.9)
BUN SERPL-MCNC: 10 MG/DL (ref 6–20)
CALCIUM SERPL-MCNC: 8.9 MG/DL (ref 8.7–10.5)
CHLORIDE SERPL-SCNC: 93 MMOL/L (ref 95–110)
CO2 SERPL-SCNC: 35 MMOL/L (ref 23–29)
CREAT SERPL-MCNC: 0.6 MG/DL (ref 0.5–1.4)
DIFFERENTIAL METHOD: ABNORMAL
EOSINOPHIL # BLD AUTO: 0.2 K/UL (ref 0–0.5)
EOSINOPHIL NFR BLD: 1.4 % (ref 0–8)
ERYTHROCYTE [DISTWIDTH] IN BLOOD BY AUTOMATED COUNT: 14.9 % (ref 11.5–14.5)
EST. GFR  (AFRICAN AMERICAN): >60 ML/MIN/1.73 M^2
EST. GFR  (NON AFRICAN AMERICAN): >60 ML/MIN/1.73 M^2
GLUCOSE SERPL-MCNC: 124 MG/DL (ref 70–110)
HCT VFR BLD AUTO: 27.3 % (ref 37–48.5)
HGB BLD-MCNC: 8.3 G/DL (ref 12–16)
LYMPHOCYTES # BLD AUTO: 1.1 K/UL (ref 1–4.8)
LYMPHOCYTES NFR BLD: 8.3 % (ref 18–48)
MCH RBC QN AUTO: 27.4 PG (ref 27–31)
MCHC RBC AUTO-ENTMCNC: 30.4 G/DL (ref 32–36)
MCV RBC AUTO: 90 FL (ref 82–98)
MONOCYTES # BLD AUTO: 0.7 K/UL (ref 0.3–1)
MONOCYTES NFR BLD: 5.2 % (ref 4–15)
NEUTROPHILS # BLD AUTO: 11 K/UL (ref 1.8–7.7)
NEUTROPHILS NFR BLD: 84.9 % (ref 38–73)
PLATELET # BLD AUTO: 356 K/UL (ref 150–350)
PMV BLD AUTO: 9.5 FL (ref 9.2–12.9)
POTASSIUM SERPL-SCNC: 3.7 MMOL/L (ref 3.5–5.1)
RBC # BLD AUTO: 3.03 M/UL (ref 4–5.4)
SODIUM SERPL-SCNC: 136 MMOL/L (ref 136–145)
WBC # BLD AUTO: 12.97 K/UL (ref 3.9–12.7)

## 2019-05-12 PROCEDURE — 80048 BASIC METABOLIC PNL TOTAL CA: CPT

## 2019-05-12 PROCEDURE — 99233 PR SUBSEQUENT HOSPITAL CARE,LEVL III: ICD-10-PCS | Mod: ,,, | Performed by: INTERNAL MEDICINE

## 2019-05-12 PROCEDURE — 25000003 PHARM REV CODE 250: Performed by: NURSE PRACTITIONER

## 2019-05-12 PROCEDURE — 63600175 PHARM REV CODE 636 W HCPCS: Performed by: NURSE PRACTITIONER

## 2019-05-12 PROCEDURE — 94760 N-INVAS EAR/PLS OXIMETRY 1: CPT

## 2019-05-12 PROCEDURE — 25000003 PHARM REV CODE 250: Performed by: HOSPITALIST

## 2019-05-12 PROCEDURE — 99233 SBSQ HOSP IP/OBS HIGH 50: CPT | Mod: ,,, | Performed by: INTERNAL MEDICINE

## 2019-05-12 PROCEDURE — 11000001 HC ACUTE MED/SURG PRIVATE ROOM

## 2019-05-12 PROCEDURE — 85025 COMPLETE CBC W/AUTO DIFF WBC: CPT

## 2019-05-12 PROCEDURE — 36415 COLL VENOUS BLD VENIPUNCTURE: CPT

## 2019-05-12 RX ORDER — CYCLOBENZAPRINE HCL 10 MG
10 TABLET ORAL 3 TIMES DAILY PRN
Status: DISCONTINUED | OUTPATIENT
Start: 2019-05-12 | End: 2019-05-15 | Stop reason: HOSPADM

## 2019-05-12 RX ORDER — FUROSEMIDE 10 MG/ML
40 INJECTION INTRAMUSCULAR; INTRAVENOUS DAILY
Status: DISCONTINUED | OUTPATIENT
Start: 2019-05-12 | End: 2019-05-15 | Stop reason: HOSPADM

## 2019-05-12 RX ORDER — FLUCONAZOLE 150 MG/1
150 TABLET ORAL ONCE
Status: COMPLETED | OUTPATIENT
Start: 2019-05-12 | End: 2019-05-12

## 2019-05-12 RX ADMIN — MICONAZOLE NITRATE: 2 OINTMENT TOPICAL at 09:05

## 2019-05-12 RX ADMIN — POTASSIUM CHLORIDE 20 MEQ: 1500 TABLET, EXTENDED RELEASE ORAL at 08:05

## 2019-05-12 RX ADMIN — FUROSEMIDE 40 MG: 10 INJECTION, SOLUTION INTRAVENOUS at 02:05

## 2019-05-12 RX ADMIN — FUROSEMIDE 40 MG: 40 TABLET ORAL at 08:05

## 2019-05-12 RX ADMIN — HYDROCODONE BITARTRATE AND ACETAMINOPHEN 1 TABLET: 10; 325 TABLET ORAL at 08:05

## 2019-05-12 RX ADMIN — FLUCONAZOLE 150 MG: 150 TABLET ORAL at 08:05

## 2019-05-12 RX ADMIN — RAMELTEON 8 MG: 8 TABLET, FILM COATED ORAL at 09:05

## 2019-05-12 RX ADMIN — CYCLOBENZAPRINE HYDROCHLORIDE 10 MG: 10 TABLET, FILM COATED ORAL at 09:05

## 2019-05-12 RX ADMIN — HYDROCODONE BITARTRATE AND ACETAMINOPHEN 1 TABLET: 10; 325 TABLET ORAL at 01:05

## 2019-05-12 RX ADMIN — HYDROCODONE BITARTRATE AND ACETAMINOPHEN 1 TABLET: 10; 325 TABLET ORAL at 02:05

## 2019-05-12 NOTE — ASSESSMENT & PLAN NOTE
May 8, 2019  --Outpatient consult to Urology for further workup. No urgent need for inpatient biopsy. Likely slow growing    May 10, 2019  --Patient will be inpatient next few days for radiation for bleeding control.  Consult IR for possible needle biopsy    May 12, 2019  FNA biopsy of kidney mass will be tomorrow with IR.

## 2019-05-12 NOTE — SUBJECTIVE & OBJECTIVE
Interval History:  Easily agitated today and reporting bilateral leg swelling    Review of Systems   Constitutional: Negative for activity change, diaphoresis, fatigue and unexpected weight change.   HENT: Negative for congestion, ear pain and sore throat.    Eyes: Negative.    Respiratory: Negative for shortness of breath and wheezing.    Cardiovascular: Positive for leg swelling. Negative for chest pain and palpitations.   Gastrointestinal: Negative for abdominal pain, constipation, diarrhea and vomiting.   Endocrine: Negative.    Genitourinary: Negative for flank pain, hematuria and urgency.   Musculoskeletal: Positive for myalgias. Negative for joint swelling and neck pain.   Skin: Positive for wound. Negative for pallor.        Bleeding right breast mass     Neurological: Negative for seizures, syncope and light-headedness.   Hematological: Negative.    Psychiatric/Behavioral: Positive for agitation.     Objective:     Vital Signs (Most Recent):  Temp: 97.7 °F (36.5 °C) (05/12/19 1239)  Pulse: 75 (05/12/19 1239)  Resp: 18 (05/12/19 1239)  BP: (!) 130/59 (05/12/19 1239)  SpO2: 97 % (05/12/19 1239) Vital Signs (24h Range):  Temp:  [97.7 °F (36.5 °C)-98.9 °F (37.2 °C)] 97.7 °F (36.5 °C)  Pulse:  [75-93] 75  Resp:  [16-20] 18  SpO2:  [91 %-98 %] 97 %  BP: (121-144)/(56-61) 130/59     Weight: (!) 149.2 kg (329 lb)  Body mass index is 53.1 kg/m².    Intake/Output Summary (Last 24 hours) at 5/12/2019 1429  Last data filed at 5/12/2019 0117  Gross per 24 hour   Intake --   Output 4 ml   Net -4 ml      Physical Exam   Constitutional: She is oriented to person, place, and time. She appears well-developed and well-nourished.        HENT:   Head: Normocephalic and atraumatic.   Eyes: Conjunctivae and EOM are normal.   Neck: Normal range of motion. Neck supple.   Cardiovascular: Normal rate, regular rhythm and normal heart sounds.   No murmur heard.  Pulmonary/Chest: Effort normal. No respiratory distress.   Mildly course,  rhonchi/wheezing     Abdominal: Soft. Bowel sounds are normal. She exhibits no distension. There is no tenderness.   Protuberant - obese     Musculoskeletal: Normal range of motion. She exhibits no edema.   Neurological: She is alert and oriented to person, place, and time.   Skin: Skin is warm and dry.   Venous stasis changes without signs of infection.    Right protruding vascular breast mass. Left palpable breast mass.  - dressing intact to right breast   Psychiatric: Her mood appears anxious. She exhibits a depressed mood.   Vitals reviewed.          Significant Labs:   CBC:   Recent Labs   Lab 05/11/19 0434 05/12/19 0430   WBC 10.12 12.97*   HGB 8.7* 8.3*   HCT 28.7* 27.3*   * 356*     CMP:   Recent Labs   Lab 05/11/19 0434 05/12/19 0430    136   K 4.0 3.7   CL 97 93*   CO2 32* 35*    124*   BUN 11 10   CREATININE 0.6 0.6   CALCIUM 9.3 8.9   ANIONGAP 9 8   EGFRNONAA >60 >60     All pertinent labs within the past 24 hours have been reviewed.    Significant Imaging: I have reviewed all pertinent imaging results/findings within the past 24 hours.

## 2019-05-12 NOTE — ASSESSMENT & PLAN NOTE
--concerning for malignancy. CT at previous facility showed mass involving bilateral breast, lung, and bone. Will order CT abdomen Pelvis for further evaluation  --Oncology consulted  --General Surgery consulted for tissue biopsy    5/8/19 plans to perform biopsy once sepsis rule out or treatment  5/9/19 - per General Surgery - had diagnostic mammogram and U/S today in clinic. Needs biopsy of right sided lymph nodes  Will have radiation to inflammatory breast area prior to going home to  [place], [place] (near [place]). Oncology will arrange follow up  5/10 - radiation day 2, will have axillary lymph node bx and breast bx Tuesday at [facility]  5/12 - radiation pending

## 2019-05-12 NOTE — NURSING
Patient removed dressing from R breast. Notified Luli Lnacaster NP and she stated to place gauze and foam tape over the mass to R breast.

## 2019-05-12 NOTE — ASSESSMENT & PLAN NOTE
5.4 cm mass  Likely a different primary  Will need outpatient biopsy  According to patient - she was aware of kidney mass back from 2014 5/12 - renal biopsy per IR on Monday

## 2019-05-12 NOTE — ASSESSMENT & PLAN NOTE
--normocytic anemia s/p 2 units PRBC's   --could be related to malignancy  --has never had colonoscopy    5/9/19  Hemoglobin 8.2. Down from 9. Closely monitor  5/10 - Hgb 7.8, if continues to fall, may need blood transfusion if continues to fall  5/12 - Hgb 8.3

## 2019-05-12 NOTE — PLAN OF CARE
Problem: Adult Inpatient Plan of Care  Goal: Plan of Care Review  Outcome: Ongoing (interventions implemented as appropriate)  Observed pt lying in bed , NAD, able to verbalize needs progressing toward goal

## 2019-05-12 NOTE — PROGRESS NOTES
[facility]  Hematology/Oncology  Progress Note    Patient Name: Isadora Colin  Admission Date: 5/7/2019  Hospital Length of Stay: 5 days  Code Status: No Order     Subjective:     HPI:  58 y.o female history of morbid obesity, peripheral edema (on Lasix at home),chronic venous stasis dermatitis who presented to [facility] on May 6, 2018 for further evaluation of bleeding right breast mass which patient reports onset approximately 4 weeks ago and has progressively gotten larger.  Associated symptoms include right breast pain.  Patient denies any other symptoms. She denies CP, SOB, bowel or urinary complaints, fever, abdominal pain, chills, or weight loss.     At [facility] in ED, she was noted hypotensive (79 systolic) without tachycardia which responded to IVF's. Labs reflected lactic acidosis, leukocytosis(23k), and normocytic anemia Hg 9.3. Lactic acidosis resolved after IVF's.  CT Chest revealed 8.3 cm right breast mass, 4.7 cm left breast mass, mass involving right chest wall and rib, and surrounding lymph node involvement. She was treated empirically with Zosyn and Vancomycin IV abx. Imaging negative for infectious process.     Patient was transferred to [facility] for higher level care. She has received 2 units of blood. She has never had a mammogram or colonoscopy. Does not have regular PCP.     CT Abdomen/Pelvis done at [facility] revealed 5.4 cm renal mass. ID consulted for elevated WBC. General surgery consulted for mass biopsy. General surgery awaiting r/o sepsis to proceed with biopsy               Interval History: Resting comfortable, afebrile.  Has oozing from breast ulcer site, no bleeding.    Oncology Treatment Plan:   [No treatment plan]    Medications:  Continuous Infusions:  Scheduled Meds:   furosemide  40 mg Oral Daily    potassium chloride  20 mEq Oral Daily     PRN Meds:HYDROcodone-acetaminophen, ramelteon     Review of Systems    Constitutional: Negative.    HENT: Negative.    Eyes: Negative.    Respiratory: Negative.    Cardiovascular: Negative.    Gastrointestinal: Negative.    Endocrine: Negative.    Genitourinary: Negative.    Musculoskeletal: Negative.    Skin: Negative.    Allergic/Immunologic: Negative.    Neurological: Negative.    Hematological: Negative.    Psychiatric/Behavioral: Negative.      Objective:     Vital Signs (Most Recent):  Temp: 98.7 °F (37.1 °C) (05/12/19 0713)  Pulse: 75 (05/12/19 0713)  Resp: 20 (05/12/19 0713)  BP: (!) 133/59 (05/12/19 0713)  SpO2: (!) 91 % (05/12/19 0713) Vital Signs (24h Range):  Temp:  [98 °F (36.7 °C)-98.9 °F (37.2 °C)] 98.7 °F (37.1 °C)  Pulse:  [75-93] 75  Resp:  [16-20] 20  SpO2:  [91 %-98 %] 91 %  BP: (121-144)/(56-61) 133/59     Weight: (!) 149.2 kg (329 lb)  Body mass index is 53.1 kg/m².  Body surface area is 2.64 meters squared.      Intake/Output Summary (Last 24 hours) at 5/12/2019 0925  Last data filed at 5/12/2019 0117  Gross per 24 hour   Intake 120 ml   Output 4 ml   Net 116 ml       Physical Exam   Constitutional: She is oriented to person, place, and time. She appears well-developed and well-nourished.   HENT:   Head: Normocephalic and atraumatic.   Eyes: Conjunctivae and EOM are normal.   Neck: Normal range of motion. Neck supple.   Cardiovascular: Normal rate and regular rhythm.   Pulmonary/Chest: Effort normal and breath sounds normal.   Abdominal: Soft. Bowel sounds are normal.   Musculoskeletal: Normal range of motion.   Neurological: She is alert and oriented to person, place, and time.   Skin: Skin is warm and dry.   Psychiatric: She has a normal mood and affect. Her behavior is normal. Judgment and thought content normal.   Nursing note and vitals reviewed.      Significant Labs:   All pertinent labs from the last 24 hours have been reviewed.    Diagnostic Results:  I have reviewed all pertinent imaging results/findings within the past 24 hours.    Assessment/Plan:      * Breast mass, right  May 8, 2019  --General surgery consult for breast mass biopsy. Gen surg would like to r/o sepsis prior to proceeding  --I spoke with Dr. Cunha, Rad/Onc who agree to see patient to determine possible radiation options  --CT Chest/Abdomen/Pelvis concerning for metastatic disease. Likely more than one primary. We will need biopsy results and further staging to determine treatment recommendations  --hemoglobin 8.1. No urgent need for additional blood transfusion. Consider PRBCs transfusion for hg < 8. WBC 20.1, likely reactive. No clear infectious source identified. ID consulted. Await ID recs  --BMP unremarkable  --Continue supportive care    May 12, 2019 biopsy of breast mass and axillary lymph node will be in clinic on Tuesday.  Mrs Colin will get name of medical oncologist in [place] who took care of her mother who had uterine cancer. Once have name of oncologist will make phone call to get clinic appointment.    May 11th  CT guided biopsy of renal mass has been arranged with Radiology for Monday May 13th.  Biopsy of breast and axillary mass will be done on Tuesday May 14th in outpatient clinic at [facility].  Have asked Mrs. Colin to let me know the name of medical oncologist in [place] so we can coordinate oncology care.  Mrs. Colin would like to be treated in [place] as it is much closer to home, where she lives with her mother.  Her daughter lives in [place], but has roommates and so she cannot stay and get care in [place].    May 9, 2019-Unable to perform breast biopsy/imaging inpatient, however it has been arranged for patient to be transferred to [facility] outpatient clinic to have biopsy performed today. She will then transfer back to hospital to complete radiation x 5 days to the right chest wall per Rad/Onc in hopes to control bleeding from right breast mass. Patient will then need to be arranged for outpatient follow up for further treatment recommendations.  Hemoglobin 8.2. No urgent indication for blood transfusion. Transfuse if hemoglobin <8. CBC otherwise unremarkable. We will hold off on Kidney mass workup as this can be done outpatient. Patient reports known history of renal mass since 2014. This is likely slow growing and will likely not be an acute issue for this patient. Patient with known difficult social situation. She is likely to need care closer to her home town which is in [place], [place]. No family here in [place] that can provide her with living arrangements. Recommend social work consult to see if they could be of any assistance to patient.     May 10, 2019--Patient did have bilateral breast US and bilateral mammogram on yesterday at [facility]. Highly suspicious for malignancy. Needs biopsies. IR consult placed today for possible axillary node biopsy and renal mass biopsy to help expedite diagnosing and treatment options for patient. Patient has received 1 of 5 planned inpatient radiation treatment in hopes to control bleeding of right breast tumor. Hemoglobin 7.8. Patient asymptomatic. No urgent need for blood transfusion. Transfuse if hemoglobin < 7 or anemia symptoms. Patient plans to seek follow up care post discharge with Oncologist near home who previously treated her mother for cancer. She is unsure of the MDs exact name. We will be happy to assist with transferring records as necessary    Left renal mass  May 8, 2019  --Outpatient consult to Urology for further workup. No urgent need for inpatient biopsy. Likely slow growing    May 10, 2019  --Patient will be inpatient next few days for radiation for bleeding control.  Consult IR for possible needle biopsy    May 12, 2019  FNA biopsy of kidney mass will be tomorrow with IR.    Leukocytosis  May 8, 2019  --WBC trending downward. No clear etiology thus far. ID consulted. General surgery would like to r/o sepsis prior to perform biopsy. Continue empiric IV abx    May 10, 2019  Resolved         Thank  you for your consult. I will follow-up with patient. Please contact us if you have any additional questions.     Brett Pepper MD  Hematology/Oncology  [facility]

## 2019-05-12 NOTE — PROGRESS NOTES
"[facility] Hospital Medicine  Progress Note    Patient Name: Isadora Colin  MRN: [id number]  Patient Class: IP- Inpatient   Admission Date: 5/7/2019  Length of Stay: 5 days  Attending Physician: Danial Price MD  Primary Care Provider: JOLENE PHIPPS III, MD        Subjective:     Principal Problem:Breast mass, right    HPI:  Isadora Colin is a 58 year old female with history of morbid obesity, peripheral edema,chronic venous stasis dermatitis who presented to [facility] on May 6, 2018 for further evaluation of bleeding right breast mass. She recalls noticing the breast mass around 4 weeks ago or "so". This was never evaluated by a physician. She denies fever, abdominal pain, chills, or weight loss. In ED, she was noted hypotensive (79 systolic) without tachycardia which responded to IVF's. Labs reflected lactic acidosis, leukocytosis(23k), and normocytic anemia. Lactic acidosis resolved after IVF's.  CT Chest revealed 8,3 cm right breast mass, 4.7 cm left breast mass, mass involving right chest wall and rib, and surrounding lymph node involvement. She was treated empirically with Zosyn and Vancomycin empirically. Imaging negative for infectious process. [facility] contacted for transfer for further sepsis work up and Oncology consult. She had not further bleeding from protruding breast mass. She has received 2 units of blood. She has never had a mammogram or colonoscopy. She takes Lasix for swelling, but denies having an Echocardiogram.                     Hospital Course:  Isadora Canas is a 58 year old female who was admitted to [facility] for further evaluation of right breast mass and leukocytosis. CT at previous facility shows 8 cm right breast mass, 4 cm left breast mass, and lung/bone/lymph node involvement suspicious for metastatic disease. Oncology consulted who recommended general Surgery consult for biopsy of breast mass. General Surgery " will perform biopsy once she is clinically stable and sepsis rule out or treated. CT abdomen Pelvis now shows 5.4 cm renal mass. Will need outpatient biopsy for this. For leukocytosis, patient was empirically treated with IV Vancomycin and Zosyn. CT chest did not suggest infection. Repeat labs 30k leukocytosis and now down to 20k. She will continue Cefepime and Vancomycin empirically. Infectious Disease consulted. 5/9 - pt took leave and had mammogram and U/S and has returned. WBC normalized and ID suspected leukocytosis was of inflammatory origin. Antibiotics discontinued. Had markings for radiation and will have initial radiation treatments to right breast to prevent further bleeding. Right axillary lymph node biopsy pending. Oncology will arrange follow up appointments in [facility] as pt will discharge home and continue treatments/diagnostic work up there. 5/10 - Radiation treatments in progress for breast mass. Pt will have renal mass biopsy by IR on Monday 5/13 and on 5/14 will have breast/lymph node biopsy at clinic then discharge to home.        Interval History:  Easily agitated today and reporting bilateral leg swelling    Review of Systems   Constitutional: Negative for activity change, diaphoresis, fatigue and unexpected weight change.   HENT: Negative for congestion, ear pain and sore throat.    Eyes: Negative.    Respiratory: Negative for shortness of breath and wheezing.    Cardiovascular: Positive for leg swelling. Negative for chest pain and palpitations.   Gastrointestinal: Negative for abdominal pain, constipation, diarrhea and vomiting.   Endocrine: Negative.    Genitourinary: Negative for flank pain, hematuria and urgency.   Musculoskeletal: Positive for myalgias. Negative for joint swelling and neck pain.   Skin: Positive for wound. Negative for pallor.        Bleeding right breast mass     Neurological: Negative for seizures, syncope and light-headedness.   Hematological: Negative.     Psychiatric/Behavioral: Positive for agitation.     Objective:     Vital Signs (Most Recent):  Temp: 97.7 °F (36.5 °C) (05/12/19 1239)  Pulse: 75 (05/12/19 1239)  Resp: 18 (05/12/19 1239)  BP: (!) 130/59 (05/12/19 1239)  SpO2: 97 % (05/12/19 1239) Vital Signs (24h Range):  Temp:  [97.7 °F (36.5 °C)-98.9 °F (37.2 °C)] 97.7 °F (36.5 °C)  Pulse:  [75-93] 75  Resp:  [16-20] 18  SpO2:  [91 %-98 %] 97 %  BP: (121-144)/(56-61) 130/59     Weight: (!) 149.2 kg (329 lb)  Body mass index is 53.1 kg/m².    Intake/Output Summary (Last 24 hours) at 5/12/2019 1429  Last data filed at 5/12/2019 0117  Gross per 24 hour   Intake --   Output 4 ml   Net -4 ml      Physical Exam   Constitutional: She is oriented to person, place, and time. She appears well-developed and well-nourished.        HENT:   Head: Normocephalic and atraumatic.   Eyes: Conjunctivae and EOM are normal.   Neck: Normal range of motion. Neck supple.   Cardiovascular: Normal rate, regular rhythm and normal heart sounds.   No murmur heard.  Pulmonary/Chest: Effort normal. No respiratory distress.   Mildly course, rhonchi/wheezing     Abdominal: Soft. Bowel sounds are normal. She exhibits no distension. There is no tenderness.   Protuberant - obese     Musculoskeletal: Normal range of motion. She exhibits no edema.   Neurological: She is alert and oriented to person, place, and time.   Skin: Skin is warm and dry.   Venous stasis changes without signs of infection.    Right protruding vascular breast mass. Left palpable breast mass.  - dressing intact to right breast   Psychiatric: Her mood appears anxious. She exhibits a depressed mood.   Vitals reviewed.          Significant Labs:   CBC:   Recent Labs   Lab 05/11/19  0434 05/12/19  0430   WBC 10.12 12.97*   HGB 8.7* 8.3*   HCT 28.7* 27.3*   * 356*     CMP:   Recent Labs   Lab 05/11/19  0434 05/12/19  0430    136   K 4.0 3.7   CL 97 93*   CO2 32* 35*    124*   BUN 11 10   CREATININE 0.6 0.6    CALCIUM 9.3 8.9   ANIONGAP 9 8   EGFRNONAA >60 >60     All pertinent labs within the past 24 hours have been reviewed.    Significant Imaging: I have reviewed all pertinent imaging results/findings within the past 24 hours.    Assessment/Plan:      * Breast mass, right  --concerning for malignancy. CT at previous facility showed mass involving bilateral breast, lung, and bone. Will order CT abdomen Pelvis for further evaluation  --Oncology consulted  --General Surgery consulted for tissue biopsy    5/8/19 plans to perform biopsy once sepsis rule out or treatment  5/9/19 - per General Surgery - had diagnostic mammogram and U/S today in clinic. Needs biopsy of right sided lymph nodes  Will have radiation to inflammatory breast area prior to going home to  [place], [place] (near [place]). Oncology will arrange follow up  5/10 - radiation day 2, will have axillary lymph node bx and breast bx Tuesday at [facility]  5/12 - radiation pending          Abnormal chest CT - Pulmonary Nodules  There are several scattered tiny right upper lobe pulmonary nodules measuring up to 6 mm on axial image 150 of series 3 highly suspicious for metastatic disease      Breast mass, left  Had diagnostic mammogram at clinic t  Impression:  Left  Mass: Left breast 57 mm x 48 mm mass at the central position. Assessment: 4C - Suspicious finding. Biopsy is recommended.      Right  Mass: Right breast mass at the central position. Assessment: 5 - Highly suggestive of malignancy. Biopsy is recommended.      BI-RADS Category:   Overall: 5 - Highly Suggestive of Malignancy  5/11 - Right axillary lymph node biopsy on Tuesday           Left renal mass  5.4 cm mass  Likely a different primary  Will need outpatient biopsy  According to patient - she was aware of kidney mass back from 2014 5/12 - renal biopsy per IR on Monday      Venous stasis dermatitis of both lower extremities  --No signs of infection  --daily skin care    Morbid obesity  Counseled on  need for weight loss      Leukocytosis  -infectious etiology not found  --reactive vs ?sepsis which is unlikely  --CT Chest negative for infectious process. CT abdomen for further evaluation  --empiric Cefepime and Vancomycin    5/8/19  30k>>20k today  Unlikely sepsis  Empiric Cefepime and Vancomycin  Infectious Disease consulted  5/9 - Resolved - per ID leukocytosis was inflammatory, ABX discontinued      Acute blood loss anemia  --normocytic anemia s/p 2 units PRBC's   --could be related to malignancy  --has never had colonoscopy    5/9/19  Hemoglobin 8.2. Down from 9. Closely monitor  5/10 - Hgb 7.8, if continues to fall, may need blood transfusion if continues to fall  5/12 - Hgb 8.3      VTE Risk Mitigation (From admission, onward)    None              Luli Lancaster NP  Department of Hospital Medicine   [facility]

## 2019-05-12 NOTE — SUBJECTIVE & OBJECTIVE
Interval History: Resting comfortable, afebrile.  Has oozing from breast ulcer site, no bleeding.    Oncology Treatment Plan:   [No treatment plan]    Medications:  Continuous Infusions:  Scheduled Meds:   furosemide  40 mg Oral Daily    potassium chloride  20 mEq Oral Daily     PRN Meds:HYDROcodone-acetaminophen, ramelteon     Review of Systems   Constitutional: Negative.    HENT: Negative.    Eyes: Negative.    Respiratory: Negative.    Cardiovascular: Negative.    Gastrointestinal: Negative.    Endocrine: Negative.    Genitourinary: Negative.    Musculoskeletal: Negative.    Skin: Negative.    Allergic/Immunologic: Negative.    Neurological: Negative.    Hematological: Negative.    Psychiatric/Behavioral: Negative.      Objective:     Vital Signs (Most Recent):  Temp: 98.7 °F (37.1 °C) (05/12/19 0713)  Pulse: 75 (05/12/19 0713)  Resp: 20 (05/12/19 0713)  BP: (!) 133/59 (05/12/19 0713)  SpO2: (!) 91 % (05/12/19 0713) Vital Signs (24h Range):  Temp:  [98 °F (36.7 °C)-98.9 °F (37.2 °C)] 98.7 °F (37.1 °C)  Pulse:  [75-93] 75  Resp:  [16-20] 20  SpO2:  [91 %-98 %] 91 %  BP: (121-144)/(56-61) 133/59     Weight: (!) 149.2 kg (329 lb)  Body mass index is 53.1 kg/m².  Body surface area is 2.64 meters squared.      Intake/Output Summary (Last 24 hours) at 5/12/2019 0925  Last data filed at 5/12/2019 0117  Gross per 24 hour   Intake 120 ml   Output 4 ml   Net 116 ml       Physical Exam   Constitutional: She is oriented to person, place, and time. She appears well-developed and well-nourished.   HENT:   Head: Normocephalic and atraumatic.   Eyes: Conjunctivae and EOM are normal.   Neck: Normal range of motion. Neck supple.   Cardiovascular: Normal rate and regular rhythm.   Pulmonary/Chest: Effort normal and breath sounds normal.   Abdominal: Soft. Bowel sounds are normal.   Musculoskeletal: Normal range of motion.   Neurological: She is alert and oriented to person, place, and time.   Skin: Skin is warm and dry.    Psychiatric: She has a normal mood and affect. Her behavior is normal. Judgment and thought content normal.   Nursing note and vitals reviewed.      Significant Labs:   All pertinent labs from the last 24 hours have been reviewed.    Diagnostic Results:  I have reviewed all pertinent imaging results/findings within the past 24 hours.

## 2019-05-12 NOTE — ASSESSMENT & PLAN NOTE
May 8, 2019  --General surgery consult for breast mass biopsy. Gen surg would like to r/o sepsis prior to proceeding  --I spoke with Dr. Cunha, Rad/Onc who agree to see patient to determine possible radiation options  --CT Chest/Abdomen/Pelvis concerning for metastatic disease. Likely more than one primary. We will need biopsy results and further staging to determine treatment recommendations  --hemoglobin 8.1. No urgent need for additional blood transfusion. Consider PRBCs transfusion for hg < 8. WBC 20.1, likely reactive. No clear infectious source identified. ID consulted. Await ID recs  --BMP unremarkable  --Continue supportive care    May 12, 2019 biopsy of breast mass and axillary lymph node will be in clinic on Tuesday.  Mrs Colin will get name of medical oncologist in [place] who took care of her mother who had uterine cancer. Once have name of oncologist will make phone call to get clinic appointment.    May 11th  CT guided biopsy of renal mass has been arranged with Radiology for Monday May 13th.  Biopsy of breast and axillary mass will be done on Tuesday May 14th in outpatient clinic at [facility].  Have asked Mrs. Colin to let me know the name of medical oncologist in [place] so we can coordinate oncology care.  Mrs. Colin would like to be treated in [place] as it is much closer to home, where she lives with her mother.  Her daughter lives in [place], but has roommates and so she cannot stay and get care in [place].    May 9, 2019-Unable to perform breast biopsy/imaging inpatient, however it has been arranged for patient to be transferred to [facility] outpatient clinic to have biopsy performed today. She will then transfer back to hospital to complete radiation x 5 days to the right chest wall per Rad/Onc in hopes to control bleeding from right breast mass. Patient will then need to be arranged for outpatient follow up for further treatment recommendations. Hemoglobin 8.2. No urgent  indication for blood transfusion. Transfuse if hemoglobin <8. CBC otherwise unremarkable. We will hold off on Kidney mass workup as this can be done outpatient. Patient reports known history of renal mass since 2014. This is likely slow growing and will likely not be an acute issue for this patient. Patient with known difficult social situation. She is likely to need care closer to her home town which is in [place], [place]. No family here in [place] that can provide her with living arrangements. Recommend social work consult to see if they could be of any assistance to patient.     May 10, 2019--Patient did have bilateral breast US and bilateral mammogram on yesterday at [facility]. Highly suspicious for malignancy. Needs biopsies. IR consult placed today for possible axillary node biopsy and renal mass biopsy to help expedite diagnosing and treatment options for patient. Patient has received 1 of 5 planned inpatient radiation treatment in hopes to control bleeding of right breast tumor. Hemoglobin 7.8. Patient asymptomatic. No urgent need for blood transfusion. Transfuse if hemoglobin < 7 or anemia symptoms. Patient plans to seek follow up care post discharge with Oncologist near home who previously treated her mother for cancer. She is unsure of the MDs exact name. We will be happy to assist with transferring records as necessary

## 2019-05-13 ENCOUNTER — TELEPHONE (OUTPATIENT)
Dept: RADIATION ONCOLOGY | Facility: CLINIC | Age: 59
End: 2019-05-13

## 2019-05-13 ENCOUNTER — TELEPHONE (OUTPATIENT)
Dept: RADIOLOGY | Facility: HOSPITAL | Age: 59
End: 2019-05-13

## 2019-05-13 DIAGNOSIS — N63.0 LUMP OR MASS IN BREAST: ICD-10-CM

## 2019-05-13 DIAGNOSIS — R92.8 ABNORMAL MAMMOGRAM OF BOTH BREASTS: Primary | ICD-10-CM

## 2019-05-13 DIAGNOSIS — R59.0 AXILLARY ADENOPATHY: ICD-10-CM

## 2019-05-13 LAB
ANION GAP SERPL CALC-SCNC: 10 MMOL/L (ref 8–16)
ANISOCYTOSIS BLD QL SMEAR: SLIGHT
BACTERIA BLD CULT: NORMAL
BACTERIA BLD CULT: NORMAL
BASOPHILS # BLD AUTO: 0.01 K/UL (ref 0–0.2)
BASOPHILS NFR BLD: 0.1 % (ref 0–1.9)
BUN SERPL-MCNC: 10 MG/DL (ref 6–20)
CALCIUM SERPL-MCNC: 8.9 MG/DL (ref 8.7–10.5)
CHLORIDE SERPL-SCNC: 92 MMOL/L (ref 95–110)
CO2 SERPL-SCNC: 34 MMOL/L (ref 23–29)
CREAT SERPL-MCNC: 0.7 MG/DL (ref 0.5–1.4)
DIFFERENTIAL METHOD: ABNORMAL
EOSINOPHIL # BLD AUTO: 0.2 K/UL (ref 0–0.5)
EOSINOPHIL NFR BLD: 2.1 % (ref 0–8)
ERYTHROCYTE [DISTWIDTH] IN BLOOD BY AUTOMATED COUNT: 14.9 % (ref 11.5–14.5)
EST. GFR  (AFRICAN AMERICAN): >60 ML/MIN/1.73 M^2
EST. GFR  (NON AFRICAN AMERICAN): >60 ML/MIN/1.73 M^2
GLUCOSE SERPL-MCNC: 112 MG/DL (ref 70–110)
HCT VFR BLD AUTO: 29 % (ref 37–48.5)
HGB BLD-MCNC: 8.7 G/DL (ref 12–16)
LYMPHOCYTES # BLD AUTO: 1 K/UL (ref 1–4.8)
LYMPHOCYTES NFR BLD: 9.4 % (ref 18–48)
MCH RBC QN AUTO: 27 PG (ref 27–31)
MCHC RBC AUTO-ENTMCNC: 30 G/DL (ref 32–36)
MCV RBC AUTO: 90 FL (ref 82–98)
MONOCYTES # BLD AUTO: 0.6 K/UL (ref 0.3–1)
MONOCYTES NFR BLD: 5.6 % (ref 4–15)
NEUTROPHILS # BLD AUTO: 9.1 K/UL (ref 1.8–7.7)
NEUTROPHILS NFR BLD: 84.3 % (ref 38–73)
PLATELET # BLD AUTO: 362 K/UL (ref 150–350)
PMV BLD AUTO: 9.4 FL (ref 9.2–12.9)
POIKILOCYTOSIS BLD QL SMEAR: SLIGHT
POLYCHROMASIA BLD QL SMEAR: ABNORMAL
POTASSIUM SERPL-SCNC: 3.5 MMOL/L (ref 3.5–5.1)
RBC # BLD AUTO: 3.22 M/UL (ref 4–5.4)
SODIUM SERPL-SCNC: 136 MMOL/L (ref 136–145)
SPHEROCYTES BLD QL SMEAR: ABNORMAL
WBC # BLD AUTO: 11.01 K/UL (ref 3.9–12.7)

## 2019-05-13 PROCEDURE — 99233 PR SUBSEQUENT HOSPITAL CARE,LEVL III: ICD-10-PCS | Mod: ,,, | Performed by: INTERNAL MEDICINE

## 2019-05-13 PROCEDURE — 88305 TISSUE SPECIMEN TO PATHOLOGY, RADIOLOGY: ICD-10-PCS | Mod: 26,,, | Performed by: PATHOLOGY

## 2019-05-13 PROCEDURE — 25000003 PHARM REV CODE 250: Performed by: NURSE PRACTITIONER

## 2019-05-13 PROCEDURE — 80048 BASIC METABOLIC PNL TOTAL CA: CPT

## 2019-05-13 PROCEDURE — 36415 COLL VENOUS BLD VENIPUNCTURE: CPT

## 2019-05-13 PROCEDURE — 99233 SBSQ HOSP IP/OBS HIGH 50: CPT | Mod: ,,, | Performed by: INTERNAL MEDICINE

## 2019-05-13 PROCEDURE — 11000001 HC ACUTE MED/SURG PRIVATE ROOM

## 2019-05-13 PROCEDURE — 63600175 PHARM REV CODE 636 W HCPCS: Performed by: NURSE PRACTITIONER

## 2019-05-13 PROCEDURE — 88305 TISSUE EXAM BY PATHOLOGIST: CPT | Performed by: PATHOLOGY

## 2019-05-13 PROCEDURE — 99232 SBSQ HOSP IP/OBS MODERATE 35: CPT | Mod: ,,, | Performed by: SURGERY

## 2019-05-13 PROCEDURE — 63600175 PHARM REV CODE 636 W HCPCS: Performed by: RADIOLOGY

## 2019-05-13 PROCEDURE — 88305 TISSUE EXAM BY PATHOLOGIST: CPT | Mod: 26,,, | Performed by: PATHOLOGY

## 2019-05-13 PROCEDURE — 99232 PR SUBSEQUENT HOSPITAL CARE,LEVL II: ICD-10-PCS | Mod: ,,, | Performed by: SURGERY

## 2019-05-13 PROCEDURE — 85025 COMPLETE CBC W/AUTO DIFF WBC: CPT

## 2019-05-13 PROCEDURE — 94761 N-INVAS EAR/PLS OXIMETRY MLT: CPT

## 2019-05-13 RX ORDER — HYDROCODONE BITARTRATE AND ACETAMINOPHEN 10; 325 MG/1; MG/1
1 TABLET ORAL EVERY 4 HOURS PRN
Status: DISCONTINUED | OUTPATIENT
Start: 2019-05-13 | End: 2019-05-15 | Stop reason: HOSPADM

## 2019-05-13 RX ORDER — MIDAZOLAM HYDROCHLORIDE 1 MG/ML
INJECTION INTRAMUSCULAR; INTRAVENOUS CODE/TRAUMA/SEDATION MEDICATION
Status: COMPLETED | OUTPATIENT
Start: 2019-05-13 | End: 2019-05-13

## 2019-05-13 RX ORDER — HYDROXYZINE HYDROCHLORIDE 25 MG/1
25 TABLET, FILM COATED ORAL 3 TIMES DAILY PRN
Status: DISCONTINUED | OUTPATIENT
Start: 2019-05-13 | End: 2019-05-15 | Stop reason: HOSPADM

## 2019-05-13 RX ORDER — LORAZEPAM 0.5 MG/1
0.5 TABLET ORAL EVERY 12 HOURS PRN
Status: DISCONTINUED | OUTPATIENT
Start: 2019-05-13 | End: 2019-05-15 | Stop reason: HOSPADM

## 2019-05-13 RX ORDER — FENTANYL CITRATE 50 UG/ML
INJECTION, SOLUTION INTRAMUSCULAR; INTRAVENOUS CODE/TRAUMA/SEDATION MEDICATION
Status: COMPLETED | OUTPATIENT
Start: 2019-05-13 | End: 2019-05-13

## 2019-05-13 RX ADMIN — FENTANYL CITRATE 25 MCG: 50 INJECTION, SOLUTION INTRAMUSCULAR; INTRAVENOUS at 02:05

## 2019-05-13 RX ADMIN — MIDAZOLAM HYDROCHLORIDE 0.5 MG: 1 INJECTION, SOLUTION INTRAMUSCULAR; INTRAVENOUS at 02:05

## 2019-05-13 RX ADMIN — FUROSEMIDE 40 MG: 10 INJECTION, SOLUTION INTRAVENOUS at 09:05

## 2019-05-13 RX ADMIN — HYDROXYZINE HYDROCHLORIDE 25 MG: 25 TABLET, FILM COATED ORAL at 10:05

## 2019-05-13 RX ADMIN — HYDROCODONE BITARTRATE AND ACETAMINOPHEN 1 TABLET: 10; 325 TABLET ORAL at 04:05

## 2019-05-13 RX ADMIN — HYDROCODONE BITARTRATE AND ACETAMINOPHEN 1 TABLET: 10; 325 TABLET ORAL at 10:05

## 2019-05-13 RX ADMIN — HYDROCODONE BITARTRATE AND ACETAMINOPHEN 1 TABLET: 10; 325 TABLET ORAL at 08:05

## 2019-05-13 RX ADMIN — MICONAZOLE NITRATE: 2 OINTMENT TOPICAL at 08:05

## 2019-05-13 RX ADMIN — HYDROCODONE BITARTRATE AND ACETAMINOPHEN 1 TABLET: 10; 325 TABLET ORAL at 02:05

## 2019-05-13 RX ADMIN — RAMELTEON 8 MG: 8 TABLET, FILM COATED ORAL at 08:05

## 2019-05-13 NOTE — ASSESSMENT & PLAN NOTE
--concerning for malignancy. CT at previous facility showed mass involving bilateral breast, lung, and bone. Will order CT abdomen Pelvis for further evaluation  --Oncology consulted  --General Surgery consulted for tissue biopsy    5/8/19 plans to perform biopsy once sepsis rule out or treatment  5/9/19 - per General Surgery - had diagnostic mammogram and U/S today in clinic. Needs biopsy of right sided lymph nodes  Will have radiation to inflammatory breast area prior to going home to  [place] (near [place]). Oncology will arrange follow up  5/10 - radiation day 2, will have axillary lymph node bx and breast bx Tuesday at [facility]  5/13 - radiation treatments 3/5 to resume

## 2019-05-13 NOTE — ASSESSMENT & PLAN NOTE
--normocytic anemia s/p 2 units PRBC's   --could be related to malignancy  --has never had colonoscopy    5/9/19  Hemoglobin 8.2. Down from 9. Closely monitor  5/10 - Hgb 7.8, if continues to fall, may need blood transfusion if continues to fall  5/13 - Hgb 8.7/Hct 29

## 2019-05-13 NOTE — OP NOTE
Pre Op Diagnosis: left renal mass     Post Op Diagnosis: same     Procedure:  Renal mass biopsy     Procedure performed by: Ronit VALDERRAMA, Reina WATT     Written Informed Consent Obtained: Yes     Specimen Removed:  yes     Estimated Blood Loss:  minimal     Findings: Local anesthesia and moderate sedation were used.     The patient tolerated the procedure well and there were no complications.      Sterile technique was performed in the left flank, lidocaine was used as a local anesthetic.  Multiple samples taken from the left renal mass.  Pt tolerated the procedure well without immediate complications.  Please see radiologist report for details. F/u with PCP and/or ordering physician.

## 2019-05-13 NOTE — SUBJECTIVE & OBJECTIVE
Interval History:  Biopsies of the renal with breast lesions are scheduled for today and tomorrow respectively    Medications:  Continuous Infusions:  Scheduled Meds:   furosemide  40 mg Intravenous Daily    miconazole nitrate 2%   Topical (Top) BID    potassium chloride  20 mEq Oral Daily     PRN Meds:cyclobenzaprine, HYDROcodone-acetaminophen, ramelteon     Review of patient's allergies indicates:  No Known Allergies  Objective:     Vital Signs (Most Recent):  Temp: 98.2 °F (36.8 °C) (05/13/19 0724)  Pulse: 82 (05/13/19 0724)  Resp: 19 (05/13/19 0724)  BP: (!) 117/56 (05/13/19 0724)  SpO2: (!) 94 % (05/13/19 0724) Vital Signs (24h Range):  Temp:  [97.1 °F (36.2 °C)-98.7 °F (37.1 °C)] 98.2 °F (36.8 °C)  Pulse:  [75-88] 82  Resp:  [17-19] 19  SpO2:  [93 %-98 %] 94 %  BP: (108-137)/(51-63) 117/56     Weight: (!) 149.2 kg (329 lb)  Body mass index is 53.1 kg/m².    Intake/Output - Last 3 Shifts       05/11 0700 - 05/12 0659 05/12 0700 - 05/13 0659 05/13 0700 - 05/14 0659    P.O. 300 2020     Total Intake(mL/kg) 300 (2) 2020 (13.5)     Urine (mL/kg/hr) 3 (0) 400 (0.1)     Stool 1      Total Output 4 400     Net +296 +1620            Urine Occurrence 4 x 7 x     Stool Occurrence  2 x 0 x          Physical Exam   Constitutional:   Morbidly obese   HENT:   Head: Normocephalic and atraumatic.   Neck: Normal range of motion. Neck supple.   Cardiovascular: Normal rate, regular rhythm, normal heart sounds and intact distal pulses.   Pulmonary/Chest: Effort normal and breath sounds normal.   Abdominal: Soft. Bowel sounds are normal.   Obese   Skin:   exophylic right breast mass   Vitals reviewed.      Significant Labs:  CBC:   Recent Labs   Lab 05/13/19  0541   WBC 11.01   RBC 3.22*   HGB 8.7*   HCT 29.0*   *   MCV 90   MCH 27.0   MCHC 30.0*     BMP:   Recent Labs   Lab 05/13/19  0541   *      K 3.5   CL 92*   CO2 34*   BUN 10   CREATININE 0.7   CALCIUM 8.9     CMP:   Recent Labs   Lab 05/07/19  1277   05/13/19  0541   *   < > 112*   CALCIUM 8.9   < > 8.9   ALBUMIN 2.4*  --   --    PROT 6.3  --   --    *   < > 136   K 4.2   < > 3.5   CO2 24   < > 34*      < > 92*   BUN 17   < > 10   CREATININE 0.9   < > 0.7   ALKPHOS 90  --   --    ALT 14  --   --    AST 27  --   --    BILITOT 0.6  --   --     < > = values in this interval not displayed.       Significant Diagnostics:  Bilateral breast and right axillary lymph biopsies

## 2019-05-13 NOTE — SEDATION DOCUMENTATION
IV to left shoulder leaking, dc'd with catheter intact.  Started new IV to Left AC with a 24 gauge - pt tolerated well.  Patient placed prone on CT table with both arms above head.  CM in place.  VSS.  Patient verbalizes understanding of instructions.

## 2019-05-13 NOTE — SUBJECTIVE & OBJECTIVE
Interval History: Tearful today. Says that pain medication does not last long enough. To resume radiation to right breast. Kidney bx scheduled for today.     Review of Systems   Constitutional: Negative for activity change, diaphoresis, fatigue and unexpected weight change.   HENT: Negative for congestion, ear pain and sore throat.    Eyes: Negative.    Respiratory: Negative for shortness of breath and wheezing.    Cardiovascular: Positive for leg swelling. Negative for chest pain and palpitations.   Gastrointestinal: Negative for abdominal pain, constipation, diarrhea and vomiting.   Endocrine: Negative.    Genitourinary: Negative for flank pain, hematuria and urgency.   Musculoskeletal: Positive for myalgias. Negative for joint swelling and neck pain.   Skin: Positive for wound. Negative for pallor.        Bleeding right breast mass     Neurological: Negative for seizures, syncope and light-headedness.   Hematological: Negative.    Psychiatric/Behavioral: Positive for agitation.     Objective:     Vital Signs (Most Recent):  Temp: 98.2 °F (36.8 °C) (05/13/19 1111)  Pulse: 80 (05/13/19 1111)  Resp: 18 (05/13/19 1111)  BP: (!) 121/56 (05/13/19 1111)  SpO2: (!) 92 % (05/13/19 1111) Vital Signs (24h Range):  Temp:  [97.1 °F (36.2 °C)-98.7 °F (37.1 °C)] 98.2 °F (36.8 °C)  Pulse:  [75-88] 80  Resp:  [17-19] 18  SpO2:  [92 %-98 %] 92 %  BP: (108-137)/(51-63) 121/56     Weight: (!) 149.2 kg (329 lb)  Body mass index is 53.1 kg/m².    Intake/Output Summary (Last 24 hours) at 5/13/2019 1232  Last data filed at 5/13/2019 0600  Gross per 24 hour   Intake 2020 ml   Output 400 ml   Net 1620 ml      Physical Exam   Constitutional: She is oriented to person, place, and time. She appears well-developed and well-nourished.        HENT:   Head: Normocephalic and atraumatic.   Eyes: Conjunctivae and EOM are normal.   Neck: Normal range of motion. Neck supple.   Cardiovascular: Normal rate, regular rhythm and normal heart sounds.   No  murmur heard.  Pulmonary/Chest: Effort normal. No respiratory distress.   Mildly course, rhonchi/wheezing     Abdominal: Soft. Bowel sounds are normal. She exhibits no distension. There is no tenderness.   Protuberant - obese     Musculoskeletal: Normal range of motion. She exhibits no edema.   Neurological: She is alert and oriented to person, place, and time.   Skin: Skin is warm and dry.   Venous stasis changes without signs of infection.    Right protruding vascular breast mass. Left palpable breast mass.  - dressing intact to right breast   Psychiatric: Her mood appears anxious. She exhibits a depressed mood.   Vitals reviewed.      Significant Labs:   CBC:   Recent Labs   Lab 05/12/19  0430 05/13/19  0541   WBC 12.97* 11.01   HGB 8.3* 8.7*   HCT 27.3* 29.0*   * 362*     CMP:   Recent Labs   Lab 05/12/19  0430 05/13/19  0541    136   K 3.7 3.5   CL 93* 92*   CO2 35* 34*   * 112*   BUN 10 10   CREATININE 0.6 0.7   CALCIUM 8.9 8.9   ANIONGAP 8 10   EGFRNONAA >60 >60     All pertinent labs within the past 24 hours have been reviewed.    Significant Imaging: I have reviewed all pertinent imaging results/findings within the past 24 hours.

## 2019-05-13 NOTE — INTERVAL H&P NOTE
The patient has been examined and the H&P has been reviewed:    I concur with the findings and no changes have occurred since H&P was written.        Active Hospital Problems    Diagnosis  POA    *Breast mass, right [N63.10]  Yes    Axillary adenopathy [R59.0]  Yes     Concerning for metastatic deposits breast cancer.  Biopsy scheduled for Tuesday      Left renal mass [N28.89]  Yes    Breast mass, left [N63.20]  Yes     Concerning for breast cancer.  The patient needs a diagnostic mammogram and left breast ultrasound with imaging of the axilla.  If there suspicious axillary nodes these would need to be biopsy.    Unfortunately these procedures cannot be done at this facility      Abnormal chest CT - Pulmonary Nodules [R93.89]  Yes     Concerning for bilateral breast cancer as well as metastatic disease.  Agree with repeating the CT scan of the chest here.    Formal breast cancer workup with Diagnostic Imaging both mammography and ultrasound as well as biopsies cannot be done at this facility and will need to be arranged as an outpatient      Acute blood loss anemia [D62]  Yes    Leukocytosis [D72.829]  Yes    Morbid obesity [E66.01]  Yes    Venous stasis dermatitis of both lower extremities [I87.2]  Yes      Resolved Hospital Problems   No resolved problems to display.

## 2019-05-13 NOTE — PROGRESS NOTES
[facility]  Hematology/Oncology  Progress Note    Patient Name: Isadora Colin  Admission Date: 5/7/2019  Hospital Length of Stay: 6 days  Code Status: No Order     Subjective:     HPI:  58 y.o female history of morbid obesity, peripheral edema (on Lasix at home),chronic venous stasis dermatitis who presented to [facility] on May 6, 2018 for further evaluation of bleeding right breast mass which patient reports onset approximately 4 weeks ago and has progressively gotten larger.  Associated symptoms include right breast pain.  Patient denies any other symptoms. She denies CP, SOB, bowel or urinary complaints, fever, abdominal pain, chills, or weight loss.     At [facility] in ED, she was noted hypotensive (79 systolic) without tachycardia which responded to IVF's. Labs reflected lactic acidosis, leukocytosis(23k), and normocytic anemia Hg 9.3. Lactic acidosis resolved after IVF's.  CT Chest revealed 8.3 cm right breast mass, 4.7 cm left breast mass, mass involving right chest wall and rib, and surrounding lymph node involvement. She was treated empirically with Zosyn and Vancomycin IV abx. Imaging negative for infectious process.     Patient was transferred to [facility] for higher level care. She has received 2 units of blood. She has never had a mammogram or colonoscopy. Does not have regular PCP.     CT Abdomen/Pelvis done at [facility] revealed 5.4 cm renal mass. ID consulted for elevated WBC. General surgery consulted for mass biopsy. General surgery awaiting r/o sepsis to proceed with biopsy               Interval History: Resting comfortable, afebrile. Daughter at bedside during assessment.  Has oozing from breast ulcer site, no bleeding.      Oncology Treatment Plan:   [No treatment plan]    Medications:  Continuous Infusions:  Scheduled Meds:   furosemide  40 mg Intravenous Daily    miconazole nitrate 2%   Topical (Top) BID    potassium chloride  20  mEq Oral Daily     PRN Meds:cyclobenzaprine, HYDROcodone-acetaminophen, hydrOXYzine HCl, ramelteon     Review of Systems   Constitutional: Positive for activity change and fever.   HENT: Negative.    Eyes: Negative.    Respiratory: Negative.    Cardiovascular: Negative.    Gastrointestinal: Negative.    Endocrine: Negative.    Genitourinary: Negative.    Musculoskeletal: Negative.    Skin: Positive for rash and wound.   Allergic/Immunologic: Negative.    Neurological: Negative.    Hematological: Negative.    Psychiatric/Behavioral: Negative.      Objective:     Vital Signs (Most Recent):  Temp: 98.2 °F (36.8 °C) (05/13/19 0724)  Pulse: 82 (05/13/19 0724)  Resp: 19 (05/13/19 0724)  BP: (!) 117/56 (05/13/19 0724)  SpO2: (!) 94 % (05/13/19 0724) Vital Signs (24h Range):  Temp:  [97.1 °F (36.2 °C)-98.7 °F (37.1 °C)] 98.2 °F (36.8 °C)  Pulse:  [75-88] 82  Resp:  [17-19] 19  SpO2:  [93 %-98 %] 94 %  BP: (108-137)/(51-63) 117/56     Weight: (!) 149.2 kg (329 lb)  Body mass index is 53.1 kg/m².  Body surface area is 2.64 meters squared.      Intake/Output Summary (Last 24 hours) at 5/13/2019 0931  Last data filed at 5/13/2019 0600  Gross per 24 hour   Intake 2020 ml   Output 400 ml   Net 1620 ml       Physical Exam   Constitutional: She is oriented to person, place, and time. She appears well-developed and well-nourished.   HENT:   Head: Normocephalic and atraumatic.   Eyes: Conjunctivae and EOM are normal.   Neck: Normal range of motion. Neck supple.   Cardiovascular: Normal rate and regular rhythm.   Pulmonary/Chest: Effort normal and breath sounds normal.   Abdominal: Soft. Bowel sounds are normal.   Musculoskeletal: Normal range of motion.   Neurological: She is alert and oriented to person, place, and time.   Skin: Skin is warm and dry. Rash noted.   Psychiatric: Her speech is normal and behavior is normal. Judgment and thought content normal. Her mood appears anxious. Cognition and memory are normal. She exhibits a  depressed mood. She is attentive.   Nursing note and vitals reviewed.      Significant Labs:   All pertinent labs from the last 24 hours have been reviewed.    Diagnostic Results:  I have reviewed all pertinent imaging results/findings within the past 24 hours.    Assessment/Plan:     * Breast mass, right  May 8, 2019  --General surgery consult for breast mass biopsy. Gen surg would like to r/o sepsis prior to proceeding  --I spoke with Dr. Cunha, Rad/Onc who agree to see patient to determine possible radiation options  --CT Chest/Abdomen/Pelvis concerning for metastatic disease. Likely more than one primary. We will need biopsy results and further staging to determine treatment recommendations  --hemoglobin 8.1. No urgent need for additional blood transfusion. Consider PRBCs transfusion for hg < 8. WBC 20.1, likely reactive. No clear infectious source identified. ID consulted. Await ID recs  --BMP unremarkable  --Continue supportive care    May 12, 2019 biopsy of breast mass and axillary lymph node will be in clinic on Tuesday.  Mrs Colin will get name of medical oncologist in [place] who took care of her mother who had uterine cancer. Once have name of oncologist will make phone call to get clinic appointment.    May 11th  CT guided biopsy of renal mass has been arranged with Radiology for Monday May 13th.  Biopsy of breast and axillary mass will be done on Tuesday May 14th in outpatient clinic at [facility].  Have asked Mrs. Colin to let me know the name of medical oncologist in [place] so we can coordinate oncology care.  Mrs. Colin would like to be treated in [place] as it is much closer to home, where she lives with her mother.  Her daughter lives in [place], but has roommates and so she cannot stay and get care in [place].    May 9, 2019-Unable to perform breast biopsy/imaging inpatient, however it has been arranged for patient to be transferred to [facility] outpatient clinic to have biopsy  performed today. She will then transfer back to hospital to complete radiation x 5 days to the right chest wall per Rad/Onc in hopes to control bleeding from right breast mass. Patient will then need to be arranged for outpatient follow up for further treatment recommendations. Hemoglobin 8.2. No urgent indication for blood transfusion. Transfuse if hemoglobin <8. CBC otherwise unremarkable. We will hold off on Kidney mass workup as this can be done outpatient. Patient reports known history of renal mass since 2014. This is likely slow growing and will likely not be an acute issue for this patient. Patient with known difficult social situation. She is likely to need care closer to her home town which is in [place], [place]. No family here in [place] that can provide her with living arrangements. Recommend social work consult to see if they could be of any assistance to patient.     May 10, 2019--Patient did have bilateral breast US and bilateral mammogram on yesterday at [facility]. Highly suspicious for malignancy. Needs biopsies. IR consult placed today for possible axillary node biopsy and renal mass biopsy to help expedite diagnosing and treatment options for patient. Patient has received 1 of 5 planned inpatient radiation treatment in hopes to control bleeding of right breast tumor. Hemoglobin 7.8. Patient asymptomatic. No urgent need for blood transfusion. Transfuse if hemoglobin < 7 or anemia symptoms. Patient plans to seek follow up care post discharge with Oncologist near home who previously treated her mother for cancer. She is unsure of the MDs exact name. We will be happy to assist with transferring records as necessary    5/13/19 Patient to have biopsy of breast mass/axillary outpatient tomorrow.  She is to have day 3 of 5 day scheduled radiation today.  She states that she would like have cancer treatment continued in [place].  She states the name of facility to send records to is Hematology Oncology  [facility] in [place] - phone number [phone].  She states would like Dr. Dayron Vargas to receive records; however is unsure if he is still practicing.  If not practicing would like records to still go to this center and would choose an oncologist within the practice.  Charge nurse on 5th floor is to have records sent.      Left renal mass  May 8, 2019  --Outpatient consult to Urology for further workup. No urgent need for inpatient biopsy. Likely slow growing    May 10, 2019  --Patient will be inpatient next few days for radiation for bleeding control.  Consult IR for possible needle biopsy    May 12, 2019  FNA biopsy of kidney mass will be tomorrow with IR.    5/13/19 Awaiting biopsy of kidney mass today.    Leukocytosis  May 8, 2019  --WBC trending downward. No clear etiology thus far. ID consulted. General surgery would like to r/o sepsis prior to perform biopsy. Continue empiric IV abx    May 10, 2019  Resolved         Thank you for your consult. I will follow-up with patient. Please contact us if you have any additional questions.     Jamia Katz NP  Hematology/Oncology  [facility]

## 2019-05-13 NOTE — ASSESSMENT & PLAN NOTE
May 8, 2019  --General surgery consult for breast mass biopsy. Gen surg would like to r/o sepsis prior to proceeding  --I spoke with Dr. Cunha, Rad/Onc who agree to see patient to determine possible radiation options  --CT Chest/Abdomen/Pelvis concerning for metastatic disease. Likely more than one primary. We will need biopsy results and further staging to determine treatment recommendations  --hemoglobin 8.1. No urgent need for additional blood transfusion. Consider PRBCs transfusion for hg < 8. WBC 20.1, likely reactive. No clear infectious source identified. ID consulted. Await ID recs  --BMP unremarkable  --Continue supportive care    May 12, 2019 biopsy of breast mass and axillary lymph node will be in clinic on Tuesday.  Mrs Colin will get name of medical oncologist in [place] who took care of her mother who had uterine cancer. Once have name of oncologist will make phone call to get clinic appointment.    May 11th  CT guided biopsy of renal mass has been arranged with Radiology for Monday May 13th.  Biopsy of breast and axillary mass will be done on Tuesday May 14th in outpatient clinic at [facility].  Have asked Mrs. Colin to let me know the name of medical oncologist in [place] so we can coordinate oncology care.  Mrs. Colin would like to be treated in [place] as it is much closer to home, where she lives with her mother.  Her daughter lives in [place], but has roommates and so she cannot stay and get care in [place].    May 9, 2019-Unable to perform breast biopsy/imaging inpatient, however it has been arranged for patient to be transferred to [facility] outpatient clinic to have biopsy performed today. She will then transfer back to hospital to complete radiation x 5 days to the right chest wall per Rad/Onc in hopes to control bleeding from right breast mass. Patient will then need to be arranged for outpatient follow up for further treatment recommendations. Hemoglobin 8.2. No urgent  indication for blood transfusion. Transfuse if hemoglobin <8. CBC otherwise unremarkable. We will hold off on Kidney mass workup as this can be done outpatient. Patient reports known history of renal mass since 2014. This is likely slow growing and will likely not be an acute issue for this patient. Patient with known difficult social situation. She is likely to need care closer to her home town which is in [place], [place]. No family here in [place] that can provide her with living arrangements. Recommend social work consult to see if they could be of any assistance to patient.     May 10, 2019--Patient did have bilateral breast US and bilateral mammogram on yesterday at [facility]. Highly suspicious for malignancy. Needs biopsies. IR consult placed today for possible axillary node biopsy and renal mass biopsy to help expedite diagnosing and treatment options for patient. Patient has received 1 of 5 planned inpatient radiation treatment in hopes to control bleeding of right breast tumor. Hemoglobin 7.8. Patient asymptomatic. No urgent need for blood transfusion. Transfuse if hemoglobin < 7 or anemia symptoms. Patient plans to seek follow up care post discharge with Oncologist near home who previously treated her mother for cancer. She is unsure of the MDs exact name. We will be happy to assist with transferring records as necessary    5/13/19 Patient to have biopsy of breast mass/axillary outpatient tomorrow.  She is to have day 3 of 5 day scheduled radiation today.  She states that she would like have cancer treatment continued in [place].  She states the name of facility to send records to is Hematology Oncology [facility] in [place] - phone number [phone].  She states would like Dr. Dayron Vargas to receive records; however is unsure if he is still practicing.  If not practicing would like records to still go to this center and would choose an oncologist within the practice.  Charge nurse on 5th floor is to  have records sent.

## 2019-05-13 NOTE — ASSESSMENT & PLAN NOTE
Concerning for breast cancer on mammogram and ultrasound.    Biopsies of the left breast as well as the right breast and right axillary abnormalities are planned for Tuesday at the [facility] at approximately noon.    The patient will need to be given a travel past for this

## 2019-05-13 NOTE — PROGRESS NOTES
[facility] -   General Surgery  Progress Note    Subjective:     History of Present Illness:  The patient was accepted in transfer from the [facility] for a fungating bleeding right breast mass that was suspicious for breast cancer.  She underwent a CT scan of her chest that showed a right and left breast masses, right axillary lymph nodes and likely metastatic disease.  She was admitted by the medical service and surgery Oncology consult were obtained.  The patient had a CT scan of her abdomen that showed a renal mass.    The patient states she noticed a breast mass about 4 weeks ago.  She says it bleeds if it rubs on her clothes.  It is not associated with a significant pain.    Post-Op Info:  * No surgery found *         Interval History:  Biopsies of the renal with breast lesions are scheduled for today and tomorrow respectively    Medications:  Continuous Infusions:  Scheduled Meds:   furosemide  40 mg Intravenous Daily    miconazole nitrate 2%   Topical (Top) BID    potassium chloride  20 mEq Oral Daily     PRN Meds:cyclobenzaprine, HYDROcodone-acetaminophen, ramelteon     Review of patient's allergies indicates:  No Known Allergies  Objective:     Vital Signs (Most Recent):  Temp: 98.2 °F (36.8 °C) (05/13/19 0724)  Pulse: 82 (05/13/19 0724)  Resp: 19 (05/13/19 0724)  BP: (!) 117/56 (05/13/19 0724)  SpO2: (!) 94 % (05/13/19 0724) Vital Signs (24h Range):  Temp:  [97.1 °F (36.2 °C)-98.7 °F (37.1 °C)] 98.2 °F (36.8 °C)  Pulse:  [75-88] 82  Resp:  [17-19] 19  SpO2:  [93 %-98 %] 94 %  BP: (108-137)/(51-63) 117/56     Weight: (!) 149.2 kg (329 lb)  Body mass index is 53.1 kg/m².    Intake/Output - Last 3 Shifts       05/11 0700 - 05/12 0659 05/12 0700 - 05/13 0659 05/13 0700 - 05/14 0659    P.O. 300 2020     Total Intake(mL/kg) 300 (2) 2020 (13.5)     Urine (mL/kg/hr) 3 (0) 400 (0.1)     Stool 1      Total Output 4 400     Net +296 +1620            Urine Occurrence 4 x 7 x     Stool Occurrence   2 x 0 x          Physical Exam   Constitutional:   Morbidly obese   HENT:   Head: Normocephalic and atraumatic.   Neck: Normal range of motion. Neck supple.   Cardiovascular: Normal rate, regular rhythm, normal heart sounds and intact distal pulses.   Pulmonary/Chest: Effort normal and breath sounds normal.   Abdominal: Soft. Bowel sounds are normal.   Obese   Skin:   exophylic right breast mass   Vitals reviewed.      Significant Labs:  CBC:   Recent Labs   Lab 05/13/19  0541   WBC 11.01   RBC 3.22*   HGB 8.7*   HCT 29.0*   *   MCV 90   MCH 27.0   MCHC 30.0*     BMP:   Recent Labs   Lab 05/13/19  0541   *      K 3.5   CL 92*   CO2 34*   BUN 10   CREATININE 0.7   CALCIUM 8.9     CMP:   Recent Labs   Lab 05/07/19  1715  05/13/19  0541   *   < > 112*   CALCIUM 8.9   < > 8.9   ALBUMIN 2.4*  --   --    PROT 6.3  --   --    *   < > 136   K 4.2   < > 3.5   CO2 24   < > 34*      < > 92*   BUN 17   < > 10   CREATININE 0.9   < > 0.7   ALKPHOS 90  --   --    ALT 14  --   --    AST 27  --   --    BILITOT 0.6  --   --     < > = values in this interval not displayed.       Significant Diagnostics:  Bilateral breast and right axillary lymph biopsies    Assessment/Plan:     * Breast mass, right  Highly concerning for breast cancer.  On imaging.  There is also enlarged axillary nodes.    The plan is for her to go to the [facility] on Tuesday for a scheduled biopsy at noon     Abnormal chest CT - Pulmonary Nodules  Likely represents metastatic disease    Breast mass, left  Concerning for breast cancer on mammogram and ultrasound.    Biopsies of the left breast as well as the right breast and right axillary abnormalities are planned for Tuesday at the [facility] at approximately noon.    The patient will need to be given a travel past for this    Left renal mass  Further management and workup per Oncology/hospital Medicine    Venous stasis dermatitis of both lower extremities  Management per  Medicine    Morbid obesity  This should be addressed with her primary care doctor after discharge    Leukocytosis  Likely secondary to the malignancy        Rufus Murphy MD  General Surgery  [facility]

## 2019-05-13 NOTE — ASSESSMENT & PLAN NOTE
Highly concerning for breast cancer.  On imaging.  There is also enlarged axillary nodes.    The plan is for her to go to the [facility] facility on Tuesday for a scheduled biopsy at noon

## 2019-05-13 NOTE — CONSULTS
New consult noted on this 59 y/o F patient for right breast protruding tumor, now with drainage. Since that last time I saw this patient on 5/8, she has now started on Radiation, and is awaiting a breast biopsy tomorrow at [facility]. Dressing removed. Right upper breast tumor is noted, now with full thickness tissue loss and necrosis noted, moderate amount tan and serous drainage, mildly malodorous, and small amount bleeding noted. Wound bed measures 5x4x0.2cm, 100% tan slough covered wound bed. Cleansed with saline. Hydrogel applied to adaptic contact layer and applied to tumor, then covered with 2 layers of aquacel extra, topped with ABD pad, and secured with Medipore tape. This dressing can be changed every 3 days, prn for excess drainage. I suggested to patient and daughter to invest in 2 or 3 good cotton sports bras that fasten in the front, and to use those in the future to hold her dressings in place due to irritation from tape that is currently noted. Please see below:    Right breast protruding tumor:  1. Cleanse with saline  2. Pat dry  3. Apply hydrogel to tumor and cover with adaptic contact layer  4. Top with 2 layers of aquacel extra hydrofiber dressing  5. Cover with ABD pad  6. Secure with medipore tape or sports bra that fastens in front, or surgical bra if available  7. Change every 3 days. Between dressing changes, For excess drainage, may change outer dressing (aquacel extra and ABD pads, but leave contact layer in place.

## 2019-05-13 NOTE — SUBJECTIVE & OBJECTIVE
Interval History: Resting comfortable, afebrile. Daughter at bedside during assessment.  Has oozing from breast ulcer site, no bleeding.      Oncology Treatment Plan:   [No treatment plan]    Medications:  Continuous Infusions:  Scheduled Meds:   furosemide  40 mg Intravenous Daily    miconazole nitrate 2%   Topical (Top) BID    potassium chloride  20 mEq Oral Daily     PRN Meds:cyclobenzaprine, HYDROcodone-acetaminophen, hydrOXYzine HCl, ramelteon     Review of Systems   Constitutional: Positive for activity change and fever.   HENT: Negative.    Eyes: Negative.    Respiratory: Negative.    Cardiovascular: Negative.    Gastrointestinal: Negative.    Endocrine: Negative.    Genitourinary: Negative.    Musculoskeletal: Negative.    Skin: Positive for rash and wound.   Allergic/Immunologic: Negative.    Neurological: Negative.    Hematological: Negative.    Psychiatric/Behavioral: Negative.      Objective:     Vital Signs (Most Recent):  Temp: 98.2 °F (36.8 °C) (05/13/19 0724)  Pulse: 82 (05/13/19 0724)  Resp: 19 (05/13/19 0724)  BP: (!) 117/56 (05/13/19 0724)  SpO2: (!) 94 % (05/13/19 0724) Vital Signs (24h Range):  Temp:  [97.1 °F (36.2 °C)-98.7 °F (37.1 °C)] 98.2 °F (36.8 °C)  Pulse:  [75-88] 82  Resp:  [17-19] 19  SpO2:  [93 %-98 %] 94 %  BP: (108-137)/(51-63) 117/56     Weight: (!) 149.2 kg (329 lb)  Body mass index is 53.1 kg/m².  Body surface area is 2.64 meters squared.      Intake/Output Summary (Last 24 hours) at 5/13/2019 0931  Last data filed at 5/13/2019 0600  Gross per 24 hour   Intake 2020 ml   Output 400 ml   Net 1620 ml       Physical Exam   Constitutional: She is oriented to person, place, and time. She appears well-developed and well-nourished.   HENT:   Head: Normocephalic and atraumatic.   Eyes: Conjunctivae and EOM are normal.   Neck: Normal range of motion. Neck supple.   Cardiovascular: Normal rate and regular rhythm.   Pulmonary/Chest: Effort normal and breath sounds normal.   Abdominal:  Soft. Bowel sounds are normal.   Musculoskeletal: Normal range of motion.   Neurological: She is alert and oriented to person, place, and time.   Skin: Skin is warm and dry. Rash noted.   Psychiatric: Her speech is normal and behavior is normal. Judgment and thought content normal. Her mood appears anxious. Cognition and memory are normal. She exhibits a depressed mood. She is attentive.   Nursing note and vitals reviewed.      Significant Labs:   All pertinent labs from the last 24 hours have been reviewed.    Diagnostic Results:  I have reviewed all pertinent imaging results/findings within the past 24 hours.

## 2019-05-13 NOTE — TELEPHONE ENCOUNTER
I spoke with Isis, patient's nurse at [facility], and gave her instructions regarding breast biopsy scheduled for 5/14/19 at [facility].

## 2019-05-13 NOTE — PLAN OF CARE
Problem: Adult Inpatient Plan of Care  Goal: Plan of Care Review  Outcome: Ongoing (interventions implemented as appropriate)  Remains free from harm, pain controlled via PO pain meds, positions self, no acute distress noted. 24 hour chart check complete.

## 2019-05-13 NOTE — TELEPHONE ENCOUNTER
Spoke with nurse Marinelli and notified her of patient's radiation treatment appt tomorrow at 9am.

## 2019-05-13 NOTE — ASSESSMENT & PLAN NOTE
5.4 cm mass  Likely a different primary  Will need outpatient biopsy  According to patient - she was aware of kidney mass back from 2014 5/13 - renal biopsy per IR scheduled for today

## 2019-05-13 NOTE — ASSESSMENT & PLAN NOTE
Had diagnostic mammogram at clinic t  Impression:  Left  Mass: Left breast 57 mm x 48 mm mass at the central position. Assessment: 4C - Suspicious finding. Biopsy is recommended.      Right  Mass: Right breast mass at the central position. Assessment: 5 - Highly suggestive of malignancy. Biopsy is recommended.      BI-RADS Category:   Overall: 5 - Highly Suggestive of Malignancy  5/12 - Right axillary lymph node biopsy on Tuesday

## 2019-05-13 NOTE — ASSESSMENT & PLAN NOTE
May 8, 2019  --Outpatient consult to Urology for further workup. No urgent need for inpatient biopsy. Likely slow growing    May 10, 2019  --Patient will be inpatient next few days for radiation for bleeding control.  Consult IR for possible needle biopsy    May 12, 2019  FNA biopsy of kidney mass will be tomorrow with IR.    5/13/19 Awaiting biopsy of kidney mass today.

## 2019-05-13 NOTE — PROGRESS NOTES
"[facility] Hospital Medicine  Progress Note    Patient Name: Isadora Colin  MRN: [id number]  Patient Class: IP- Inpatient   Admission Date: 5/7/2019  Length of Stay: 6 days  Attending Physician: Danial Price MD  Primary Care Provider: JOLENE PHIPPS III, MD        Subjective:     Principal Problem:Breast mass, right    HPI:  Isadora Colin is a 58 year old female with history of morbid obesity, peripheral edema,chronic venous stasis dermatitis who presented to [facility] on May 6, 2018 for further evaluation of bleeding right breast mass. She recalls noticing the breast mass around 4 weeks ago or "so". This was never evaluated by a physician. She denies fever, abdominal pain, chills, or weight loss. In ED, she was noted hypotensive (79 systolic) without tachycardia which responded to IVF's. Labs reflected lactic acidosis, leukocytosis(23k), and normocytic anemia. Lactic acidosis resolved after IVF's.  CT Chest revealed 8,3 cm right breast mass, 4.7 cm left breast mass, mass involving right chest wall and rib, and surrounding lymph node involvement. She was treated empirically with Zosyn and Vancomycin empirically. Imaging negative for infectious process. [facility] contacted for transfer for further sepsis work up and Oncology consult. She had not further bleeding from protruding breast mass. She has received 2 units of blood. She has never had a mammogram or colonoscopy. She takes Lasix for swelling, but denies having an Echocardiogram.                     Hospital Course:  Isadora Canas is a 58 year old female who was admitted to [facility] for further evaluation of right breast mass and leukocytosis. CT at previous facility shows 8 cm right breast mass, 4 cm left breast mass, and lung/bone/lymph node involvement suspicious for metastatic disease. Oncology consulted who recommended general Surgery consult for biopsy of breast mass. General Surgery " will perform biopsy once she is clinically stable and sepsis rule out or treated. CT abdomen Pelvis now shows 5.4 cm renal mass. Will need outpatient biopsy for this. For leukocytosis, patient was empirically treated with IV Vancomycin and Zosyn. CT chest did not suggest infection. Repeat labs 30k leukocytosis and now down to 20k. She will continue Cefepime and Vancomycin empirically. Infectious Disease consulted. 5/9 - pt took leave and had mammogram and U/S and has returned. WBC normalized and ID suspected leukocytosis was of inflammatory origin. Antibiotics discontinued. Had markings for radiation and will have initial radiation treatments to right breast to prevent further bleeding. Right axillary lymph node biopsy pending. Oncology will arrange follow up appointments in [facility] as pt will discharge home and continue treatments/diagnostic work up there. 5/10 - Radiation treatments in progress for breast mass. Pt will have renal mass biopsy by IR on Monday 5/13 and on 5/14 will have breast/lymph node biopsy at clinic then discharge to home.        Interval History: Tearful today. Says that pain medication does not last long enough. To resume radiation to right breast. Kidney bx scheduled for today.     Review of Systems   Constitutional: Negative for activity change, diaphoresis, fatigue and unexpected weight change.   HENT: Negative for congestion, ear pain and sore throat.    Eyes: Negative.    Respiratory: Negative for shortness of breath and wheezing.    Cardiovascular: Positive for leg swelling. Negative for chest pain and palpitations.   Gastrointestinal: Negative for abdominal pain, constipation, diarrhea and vomiting.   Endocrine: Negative.    Genitourinary: Negative for flank pain, hematuria and urgency.   Musculoskeletal: Positive for myalgias. Negative for joint swelling and neck pain.   Skin: Positive for wound. Negative for pallor.        Bleeding right breast mass     Neurological: Negative  for seizures, syncope and light-headedness.   Hematological: Negative.    Psychiatric/Behavioral: Positive for agitation.     Objective:     Vital Signs (Most Recent):  Temp: 98.2 °F (36.8 °C) (05/13/19 1111)  Pulse: 80 (05/13/19 1111)  Resp: 18 (05/13/19 1111)  BP: (!) 121/56 (05/13/19 1111)  SpO2: (!) 92 % (05/13/19 1111) Vital Signs (24h Range):  Temp:  [97.1 °F (36.2 °C)-98.7 °F (37.1 °C)] 98.2 °F (36.8 °C)  Pulse:  [75-88] 80  Resp:  [17-19] 18  SpO2:  [92 %-98 %] 92 %  BP: (108-137)/(51-63) 121/56     Weight: (!) 149.2 kg (329 lb)  Body mass index is 53.1 kg/m².    Intake/Output Summary (Last 24 hours) at 5/13/2019 1232  Last data filed at 5/13/2019 0600  Gross per 24 hour   Intake 2020 ml   Output 400 ml   Net 1620 ml      Physical Exam   Constitutional: She is oriented to person, place, and time. She appears well-developed and well-nourished.        HENT:   Head: Normocephalic and atraumatic.   Eyes: Conjunctivae and EOM are normal.   Neck: Normal range of motion. Neck supple.   Cardiovascular: Normal rate, regular rhythm and normal heart sounds.   No murmur heard.  Pulmonary/Chest: Effort normal. No respiratory distress.   Mildly course, rhonchi/wheezing     Abdominal: Soft. Bowel sounds are normal. She exhibits no distension. There is no tenderness.   Protuberant - obese     Musculoskeletal: Normal range of motion. She exhibits no edema.   Neurological: She is alert and oriented to person, place, and time.   Skin: Skin is warm and dry.   Venous stasis changes without signs of infection.    Right protruding vascular breast mass. Left palpable breast mass.  - dressing intact to right breast   Psychiatric: Her mood appears anxious. She exhibits a depressed mood.   Vitals reviewed.      Significant Labs:   CBC:   Recent Labs   Lab 05/12/19  0430 05/13/19  0541   WBC 12.97* 11.01   HGB 8.3* 8.7*   HCT 27.3* 29.0*   * 362*     CMP:   Recent Labs   Lab 05/12/19  0430 05/13/19  0541    136   K 3.7 3.5    CL 93* 92*   CO2 35* 34*   * 112*   BUN 10 10   CREATININE 0.6 0.7   CALCIUM 8.9 8.9   ANIONGAP 8 10   EGFRNONAA >60 >60     All pertinent labs within the past 24 hours have been reviewed.    Significant Imaging: I have reviewed all pertinent imaging results/findings within the past 24 hours.    Assessment/Plan:      * Breast mass, right  --concerning for malignancy. CT at previous facility showed mass involving bilateral breast, lung, and bone. Will order CT abdomen Pelvis for further evaluation  --Oncology consulted  --General Surgery consulted for tissue biopsy    5/8/19 plans to perform biopsy once sepsis rule out or treatment  5/9/19 - per General Surgery - had diagnostic mammogram and U/S today in clinic. Needs biopsy of right sided lymph nodes  Will have radiation to inflammatory breast area prior to going home to  [facility] (near [place]). Oncology will arrange follow up  5/10 - radiation day 2, will have axillary lymph node bx and breast bx Tuesday at [facility]  5/13 - radiation treatments 3/5 to resume          Abnormal chest CT - Pulmonary Nodules  There are several scattered tiny right upper lobe pulmonary nodules measuring up to 6 mm on axial image 150 of series 3 highly suspicious for metastatic disease      Breast mass, left  Had diagnostic mammogram at clinic t  Impression:  Left  Mass: Left breast 57 mm x 48 mm mass at the central position. Assessment: 4C - Suspicious finding. Biopsy is recommended.      Right  Mass: Right breast mass at the central position. Assessment: 5 - Highly suggestive of malignancy. Biopsy is recommended.      BI-RADS Category:   Overall: 5 - Highly Suggestive of Malignancy  5/12 - Right axillary lymph node biopsy on Tuesday           Left renal mass  5.4 cm mass  Likely a different primary  Will need outpatient biopsy  According to patient - she was aware of kidney mass back from 2014 5/13 - renal biopsy per IR scheduled for today      Venous stasis dermatitis  of both lower extremities  --No signs of infection  --daily skin care    Morbid obesity  Counseled on need for weight loss      Leukocytosis  -infectious etiology not found  --reactive vs ?sepsis which is unlikely  --CT Chest negative for infectious process. CT abdomen for further evaluation  --empiric Cefepime and Vancomycin    5/8/19  30k>>20k today  Unlikely sepsis  Empiric Cefepime and Vancomycin  Infectious Disease consulted  5/9 - Resolved - per ID leukocytosis was inflammatory, ABX discontinued      Acute blood loss anemia  --normocytic anemia s/p 2 units PRBC's   --could be related to malignancy  --has never had colonoscopy    5/9/19  Hemoglobin 8.2. Down from 9. Closely monitor  5/10 - Hgb 7.8, if continues to fall, may need blood transfusion if continues to fall  5/13 - Hgb 8.7/Hct 29      VTE Risk Mitigation (From admission, onward)    None              Luli Lancaster, NP  Department of Hospital Medicine   [facility]

## 2019-05-14 ENCOUNTER — DOCUMENTATION ONLY (OUTPATIENT)
Dept: RADIATION ONCOLOGY | Facility: CLINIC | Age: 59
End: 2019-05-14

## 2019-05-14 PROBLEM — D72.829 LEUKOCYTOSIS: Status: RESOLVED | Noted: 2019-05-07 | Resolved: 2019-05-14

## 2019-05-14 LAB
ANION GAP SERPL CALC-SCNC: 8 MMOL/L (ref 8–16)
BASOPHILS # BLD AUTO: 0.01 K/UL (ref 0–0.2)
BASOPHILS NFR BLD: 0.1 % (ref 0–1.9)
BUN SERPL-MCNC: 12 MG/DL (ref 6–20)
CALCIUM SERPL-MCNC: 9 MG/DL (ref 8.7–10.5)
CHLORIDE SERPL-SCNC: 93 MMOL/L (ref 95–110)
CO2 SERPL-SCNC: 36 MMOL/L (ref 23–29)
CREAT SERPL-MCNC: 0.7 MG/DL (ref 0.5–1.4)
DIFFERENTIAL METHOD: ABNORMAL
EOSINOPHIL # BLD AUTO: 0.2 K/UL (ref 0–0.5)
EOSINOPHIL NFR BLD: 2 % (ref 0–8)
ERYTHROCYTE [DISTWIDTH] IN BLOOD BY AUTOMATED COUNT: 15.2 % (ref 11.5–14.5)
EST. GFR  (AFRICAN AMERICAN): >60 ML/MIN/1.73 M^2
EST. GFR  (NON AFRICAN AMERICAN): >60 ML/MIN/1.73 M^2
GLUCOSE SERPL-MCNC: 100 MG/DL (ref 70–110)
HCT VFR BLD AUTO: 29.7 % (ref 37–48.5)
HGB BLD-MCNC: 8.8 G/DL (ref 12–16)
LYMPHOCYTES # BLD AUTO: 1.2 K/UL (ref 1–4.8)
LYMPHOCYTES NFR BLD: 11.2 % (ref 18–48)
MCH RBC QN AUTO: 27.1 PG (ref 27–31)
MCHC RBC AUTO-ENTMCNC: 29.6 G/DL (ref 32–36)
MCV RBC AUTO: 91 FL (ref 82–98)
MONOCYTES # BLD AUTO: 0.8 K/UL (ref 0.3–1)
MONOCYTES NFR BLD: 7.4 % (ref 4–15)
NEUTROPHILS # BLD AUTO: 8.8 K/UL (ref 1.8–7.7)
NEUTROPHILS NFR BLD: 79.3 % (ref 38–73)
PLATELET # BLD AUTO: 345 K/UL (ref 150–350)
PMV BLD AUTO: 9.6 FL (ref 9.2–12.9)
POTASSIUM SERPL-SCNC: 4.2 MMOL/L (ref 3.5–5.1)
RBC # BLD AUTO: 3.25 M/UL (ref 4–5.4)
SODIUM SERPL-SCNC: 137 MMOL/L (ref 136–145)
WBC # BLD AUTO: 11.1 K/UL (ref 3.9–12.7)

## 2019-05-14 PROCEDURE — 99233 SBSQ HOSP IP/OBS HIGH 50: CPT | Mod: ,,, | Performed by: INTERNAL MEDICINE

## 2019-05-14 PROCEDURE — 94761 N-INVAS EAR/PLS OXIMETRY MLT: CPT

## 2019-05-14 PROCEDURE — 36415 COLL VENOUS BLD VENIPUNCTURE: CPT

## 2019-05-14 PROCEDURE — 77412 RADIATION TX DELIVERY LVL 3: CPT | Performed by: RADIOLOGY

## 2019-05-14 PROCEDURE — 25000003 PHARM REV CODE 250: Performed by: NURSE PRACTITIONER

## 2019-05-14 PROCEDURE — G6002 PR STEREOSCOPIC XRAY GUIDE FOR RADIATION TX DELIV: ICD-10-PCS | Mod: 26,,, | Performed by: RADIOLOGY

## 2019-05-14 PROCEDURE — G6002 STEREOSCOPIC X-RAY GUIDANCE: HCPCS | Mod: 26,,, | Performed by: RADIOLOGY

## 2019-05-14 PROCEDURE — 99232 SBSQ HOSP IP/OBS MODERATE 35: CPT | Mod: ,,, | Performed by: SURGERY

## 2019-05-14 PROCEDURE — 27000221 HC OXYGEN, UP TO 24 HOURS

## 2019-05-14 PROCEDURE — 85025 COMPLETE CBC W/AUTO DIFF WBC: CPT

## 2019-05-14 PROCEDURE — 80048 BASIC METABOLIC PNL TOTAL CA: CPT

## 2019-05-14 PROCEDURE — 63600175 PHARM REV CODE 636 W HCPCS: Performed by: INTERNAL MEDICINE

## 2019-05-14 PROCEDURE — 11000001 HC ACUTE MED/SURG PRIVATE ROOM

## 2019-05-14 PROCEDURE — 99232 PR SUBSEQUENT HOSPITAL CARE,LEVL II: ICD-10-PCS | Mod: ,,, | Performed by: SURGERY

## 2019-05-14 PROCEDURE — 99233 PR SUBSEQUENT HOSPITAL CARE,LEVL III: ICD-10-PCS | Mod: ,,, | Performed by: INTERNAL MEDICINE

## 2019-05-14 PROCEDURE — 77387 GUIDANCE FOR RADJ TX DLVR: CPT | Mod: TC | Performed by: RADIOLOGY

## 2019-05-14 RX ORDER — FUROSEMIDE 10 MG/ML
40 INJECTION INTRAMUSCULAR; INTRAVENOUS ONCE
Status: COMPLETED | OUTPATIENT
Start: 2019-05-14 | End: 2019-05-14

## 2019-05-14 RX ADMIN — HYDROCODONE BITARTRATE AND ACETAMINOPHEN 1 TABLET: 10; 325 TABLET ORAL at 01:05

## 2019-05-14 RX ADMIN — FUROSEMIDE 40 MG: 10 INJECTION, SOLUTION INTRAVENOUS at 05:05

## 2019-05-14 RX ADMIN — HYDROCODONE BITARTRATE AND ACETAMINOPHEN 1 TABLET: 10; 325 TABLET ORAL at 05:05

## 2019-05-14 RX ADMIN — HYDROXYZINE HYDROCHLORIDE 25 MG: 25 TABLET, FILM COATED ORAL at 05:05

## 2019-05-14 RX ADMIN — HYDROCODONE BITARTRATE AND ACETAMINOPHEN 1 TABLET: 10; 325 TABLET ORAL at 09:05

## 2019-05-14 RX ADMIN — MICONAZOLE NITRATE: 2 OINTMENT TOPICAL at 08:05

## 2019-05-14 RX ADMIN — LORAZEPAM 0.5 MG: 0.5 TABLET ORAL at 09:05

## 2019-05-14 RX ADMIN — HYDROXYZINE HYDROCHLORIDE 25 MG: 25 TABLET, FILM COATED ORAL at 10:05

## 2019-05-14 RX ADMIN — HYDROCODONE BITARTRATE AND ACETAMINOPHEN 1 TABLET: 10; 325 TABLET ORAL at 10:05

## 2019-05-14 NOTE — ASSESSMENT & PLAN NOTE
Further management and workup per Oncology/hospital Medicine  CT biopsy complete.  Await pathology

## 2019-05-14 NOTE — ASSESSMENT & PLAN NOTE
5/14/19 - Patient is anemic with hemoglobin of 8.8.  No complaints of chest pain or shortness of breath.  States fatigue.  Denies melena, hematochezia, hematuria, epistaxis, or unusual bruising.  States did have a significant amount of blood drainage from right breast mass prior to hospitalization, but was only for one day prior to coming to the hospital.  Discussed the need for anemia workup that can be done outpatient with her new hematology/oncology clinic.

## 2019-05-14 NOTE — ASSESSMENT & PLAN NOTE
Concerning for breast cancer on mammogram and ultrasound.    Biopsies of the left breast as well as the right breast and right axillary abnormalities are planned for today at the [facility] at approximately noon.    The patient will need to be given a travel past for this

## 2019-05-14 NOTE — PLAN OF CARE
05/14/19 0942   Medicare Message   Important Message from Medicare regarding Discharge Appeal Rights Given to patient/caregiver;Explained to patient/caregiver;Signed/date by patient/caregiver   Date IMM was signed 05/14/19   Time IMM was signed 0942

## 2019-05-14 NOTE — SUBJECTIVE & OBJECTIVE
Interval History: No acute events overnight. Patient underwent 3rd radiation treatment today, tolerated well.  Breast/lymph node biopsy at [facility] today.        Review of Systems   Constitutional: Negative for activity change, diaphoresis, fatigue and unexpected weight change.   HENT: Negative for congestion, ear pain and sore throat.    Eyes: Negative.    Respiratory: Negative for shortness of breath and wheezing.    Cardiovascular: Positive for leg swelling. Negative for chest pain and palpitations.   Gastrointestinal: Negative for abdominal pain, constipation, diarrhea and vomiting.   Endocrine: Negative.    Genitourinary: Negative for flank pain, hematuria and urgency.   Musculoskeletal: Positive for myalgias. Negative for joint swelling and neck pain.   Skin: Positive for wound. Negative for pallor.             Neurological: Negative for seizures, syncope and light-headedness.   Hematological: Negative.    Psychiatric/Behavioral: Negative for agitation.     Objective:     Vital Signs (Most Recent):  Temp: 98.1 °F (36.7 °C) (05/14/19 0833)  Pulse: 72 (05/14/19 0833)  Resp: 17 (05/14/19 0833)  BP: (!) 99/50 (05/14/19 0833)  SpO2: 100 % (05/14/19 0833) Vital Signs (24h Range):  Temp:  [98.1 °F (36.7 °C)-99 °F (37.2 °C)] 98.1 °F (36.7 °C)  Pulse:  [] 72  Resp:  [16-20] 17  SpO2:  [95 %-100 %] 100 %  BP: ()/(50-63) 99/50     Weight: (!) 149.2 kg (329 lb)  Body mass index is 53.1 kg/m².    Intake/Output Summary (Last 24 hours) at 5/14/2019 1605  Last data filed at 5/13/2019 1712  Gross per 24 hour   Intake 600 ml   Output --   Net 600 ml      Physical Exam   Constitutional: She is oriented to person, place, and time. She appears well-developed and well-nourished.        HENT:   Head: Normocephalic and atraumatic.   Eyes: Conjunctivae and EOM are normal.   Neck: Normal range of motion. Neck supple.   Cardiovascular: Normal rate, regular rhythm and normal heart sounds.   No murmur heard.  Pulmonary/Chest:  Effort normal. No respiratory distress. She has rhonchi.        Abdominal: Soft. Bowel sounds are normal. She exhibits no distension. There is no tenderness.   Protuberant - obese     Musculoskeletal: Normal range of motion. She exhibits no edema.   Neurological: She is alert and oriented to person, place, and time.   Skin: Skin is warm and dry.   Venous stasis changes without signs of infection.    Right protruding vascular breast mass. Left palpable breast mass.  - dressing intact to right breast   Psychiatric: Her mood appears anxious. She exhibits a depressed mood.   Vitals reviewed.      Significant Labs:   BMP:   Recent Labs   Lab 05/14/19 0419         K 4.2   CL 93*   CO2 36*   BUN 12   CREATININE 0.7   CALCIUM 9.0     CBC:   Recent Labs   Lab 05/13/19 0541 05/14/19 0419   WBC 11.01 11.10   HGB 8.7* 8.8*   HCT 29.0* 29.7*   * 345     CMP:   Recent Labs   Lab 05/13/19 0541 05/14/19 0419    137   K 3.5 4.2   CL 92* 93*   CO2 34* 36*   * 100   BUN 10 12   CREATININE 0.7 0.7   CALCIUM 8.9 9.0   ANIONGAP 10 8   EGFRNONAA >60 >60     All pertinent labs within the past 24 hours have been reviewed.    Significant Imaging:

## 2019-05-14 NOTE — ASSESSMENT & PLAN NOTE
Highly concerning for breast cancer.  On imaging.  There is also enlarged axillary nodes.    The plan is for her to go to the [facility] facility today for a scheduled biopsy at noon

## 2019-05-14 NOTE — NURSING
Called and spoke with Gerri at [facility] regarding post biopsy instructions, all questions were answered.

## 2019-05-14 NOTE — PROGRESS NOTES
[facility]  Hematology/Oncology  Progress Note    Patient Name: Isadora Colin  Admission Date: 5/7/2019  Hospital Length of Stay: 7 days  Code Status: No Order     Subjective:     HPI:  58 y.o female history of morbid obesity, peripheral edema (on Lasix at home),chronic venous stasis dermatitis who presented to [facility] on May 6, 2018 for further evaluation of bleeding right breast mass which patient reports onset approximately 4 weeks ago and has progressively gotten larger.  Associated symptoms include right breast pain.  Patient denies any other symptoms. She denies CP, SOB, bowel or urinary complaints, fever, abdominal pain, chills, or weight loss.     At [facility] in ED, she was noted hypotensive (79 systolic) without tachycardia which responded to IVF's. Labs reflected lactic acidosis, leukocytosis(23k), and normocytic anemia Hg 9.3. Lactic acidosis resolved after IVF's.  CT Chest revealed 8.3 cm right breast mass, 4.7 cm left breast mass, mass involving right chest wall and rib, and surrounding lymph node involvement. She was treated empirically with Zosyn and Vancomycin IV abx. Imaging negative for infectious process.     Patient was transferred to [facility] for higher level care. She has received 2 units of blood. She has never had a mammogram or colonoscopy. Does not have regular PCP.     CT Abdomen/Pelvis done at [facility] revealed 5.4 cm renal mass. ID consulted for elevated WBC. General surgery consulted for mass biopsy. General surgery awaiting r/o sepsis to proceed with biopsy               Interval History: Resting comfortable, afebrile. Daughter at bedside during assessment. Awaiting 3rd radiation treatment.  To have breast biopsy today at [facility].      Oncology Treatment Plan:   [No treatment plan]    Medications:  Continuous Infusions:  Scheduled Meds:   furosemide  40 mg Intravenous Daily    miconazole nitrate 2%   Topical (Top) BID     potassium chloride  20 mEq Oral Daily     PRN Meds:cyclobenzaprine, HYDROcodone-acetaminophen, hydrOXYzine HCl, LORazepam, ramelteon     Review of Systems   Constitutional: Positive for activity change. Negative for fever.   HENT: Negative.    Eyes: Negative.    Respiratory: Negative.    Cardiovascular: Negative.    Gastrointestinal: Negative.    Endocrine: Negative.    Genitourinary: Negative.    Musculoskeletal: Negative.    Skin: Positive for rash and wound.   Allergic/Immunologic: Positive for immunocompromised state.   Neurological: Negative.    Hematological: Negative.    Psychiatric/Behavioral: Negative.      Objective:     Vital Signs (Most Recent):  Temp: 98.1 °F (36.7 °C) (05/14/19 0833)  Pulse: 72 (05/14/19 0833)  Resp: 17 (05/14/19 0833)  BP: (!) 99/50 (05/14/19 0833)  SpO2: 100 % (05/14/19 0833) Vital Signs (24h Range):  Temp:  [98.1 °F (36.7 °C)-99 °F (37.2 °C)] 98.1 °F (36.7 °C)  Pulse:  [] 72  Resp:  [15-20] 17  SpO2:  [92 %-100 %] 100 %  BP: ()/(50-71) 99/50     Weight: (!) 149.2 kg (329 lb)  Body mass index is 53.1 kg/m².  Body surface area is 2.64 meters squared.      Intake/Output Summary (Last 24 hours) at 5/14/2019 0954  Last data filed at 5/13/2019 1712  Gross per 24 hour   Intake 600 ml   Output --   Net 600 ml       Physical Exam   Constitutional: She is oriented to person, place, and time. She appears well-developed and well-nourished.   HENT:   Head: Normocephalic and atraumatic.   Eyes: Conjunctivae and EOM are normal.   Neck: Normal range of motion. Neck supple.   Cardiovascular: Normal rate and regular rhythm.   Pulmonary/Chest: Effort normal and breath sounds normal.   Abdominal: Soft. Bowel sounds are normal.   Musculoskeletal: Normal range of motion.   Neurological: She is alert and oriented to person, place, and time.   Skin: Skin is warm and dry. Rash noted.   Psychiatric: Her speech is normal and behavior is normal. Judgment and thought content normal. Her mood  appears anxious. Cognition and memory are normal. She exhibits a depressed mood. She is attentive.   Nursing note and vitals reviewed.      Significant Labs:   All pertinent labs from the last 24 hours have been reviewed.    Diagnostic Results:  I have reviewed all pertinent imaging results/findings within the past 24 hours.    Assessment/Plan:     * Breast mass, right  May 8, 2019  --General surgery consult for breast mass biopsy. Gen surg would like to r/o sepsis prior to proceeding  --I spoke with Dr. Cunha, Rad/Onc who agree to see patient to determine possible radiation options  --CT Chest/Abdomen/Pelvis concerning for metastatic disease. Likely more than one primary. We will need biopsy results and further staging to determine treatment recommendations  --hemoglobin 8.1. No urgent need for additional blood transfusion. Consider PRBCs transfusion for hg < 8. WBC 20.1, likely reactive. No clear infectious source identified. ID consulted. Await ID recs  --BMP unremarkable  --Continue supportive care    May 12, 2019 biopsy of breast mass and axillary lymph node will be in clinic on Tuesday.  Mrs Colin will get name of medical oncologist in [place] who took care of her mother who had uterine cancer. Once have name of oncologist will make phone call to get clinic appointment.    May 11th  CT guided biopsy of renal mass has been arranged with Radiology for Monday May 13th.  Biopsy of breast and axillary mass will be done on Tuesday May 14th in outpatient clinic at [facility].  Have asked Mrs. Colin to let me know the name of medical oncologist in [place] so we can coordinate oncology care.  Mrs. Colin would like to be treated in [place] as it is much closer to home, where she lives with her mother.  Her daughter lives in [place], but has roommates and so she cannot stay and get care in [place].    May 9, 2019-Unable to perform breast biopsy/imaging inpatient, however it has been arranged for patient  to be transferred to [facility] outpatient clinic to have biopsy performed today. She will then transfer back to hospital to complete radiation x 5 days to the right chest wall per Rad/Onc in hopes to control bleeding from right breast mass. Patient will then need to be arranged for outpatient follow up for further treatment recommendations. Hemoglobin 8.2. No urgent indication for blood transfusion. Transfuse if hemoglobin <8. CBC otherwise unremarkable. We will hold off on Kidney mass workup as this can be done outpatient. Patient reports known history of renal mass since 2014. This is likely slow growing and will likely not be an acute issue for this patient. Patient with known difficult social situation. She is likely to need care closer to her home town which is in [place], [place]. No family here in [place] that can provide her with living arrangements. Recommend social work consult to see if they could be of any assistance to patient.     May 10, 2019--Patient did have bilateral breast US and bilateral mammogram on yesterday at [facility]. Highly suspicious for malignancy. Needs biopsies. IR consult placed today for possible axillary node biopsy and renal mass biopsy to help expedite diagnosing and treatment options for patient. Patient has received 1 of 5 planned inpatient radiation treatment in hopes to control bleeding of right breast tumor. Hemoglobin 7.8. Patient asymptomatic. No urgent need for blood transfusion. Transfuse if hemoglobin < 7 or anemia symptoms. Patient plans to seek follow up care post discharge with Oncologist near home who previously treated her mother for cancer. She is unsure of the MDs exact name. We will be happy to assist with transferring records as necessary    5/13/19 Patient to have biopsy of breast mass/axillary outpatient tomorrow.  She is to have day 3 of 5 day scheduled radiation today.  She states that she would like have cancer treatment continued in [place].  She states  the name of facility to send records to is [facility] in [place] - phone number [phone].  She states would like Dr. Dayron Vargas to receive records; however is unsure if he is still practicing.  If not practicing would like records to still go to this center and would choose an oncologist within the practice.  Charge nurse on 5th floor is to have records sent.      5/14/19 Biopsy of right breast scheduled for today.  Patient had her 3rd dose of radiation to her right breast tumor today.  States no longer oozing.  Discussed the need to schedule an appointment with her oncologist of choice at the [facility] in [place] on next Friday to discuss results and plan of care.  Her and her daughter verbalized understanding.  She was given Dr. Pepper's care and phone number in case of any questions.  Charge nurse sending records to her new oncologist's office.      Left renal mass  May 8, 2019  --Outpatient consult to Urology for further workup. No urgent need for inpatient biopsy. Likely slow growing    May 10, 2019  --Patient will be inpatient next few days for radiation for bleeding control.  Consult IR for possible needle biopsy    May 12, 2019  FNA biopsy of kidney mass will be tomorrow with IR.    5/13/19 Awaiting biopsy of kidney mass today.    5/14/19 Biopsy done yesterday afternoon.  Patient tolerated well.  She is aware that she will need to follow up with her oncologist in [place] to discuss results and plan of care.  Discussed setting up MyChart with patient's daughter so that they could have access to her records.  Charge nurse, Latrice Moses, sending records to her [facility] cancer center in [place].      Leukocytosis  May 8, 2019  --WBC trending downward. No clear etiology thus far. ID consulted. General surgery would like to r/o sepsis prior to perform biopsy. Continue empiric IV abx    May 10, 2019  Resolved     Acute blood loss anemia  5/14/19 - Patient is  anemic with hemoglobin of 8.8.  No complaints of chest pain or shortness of breath.  States fatigue.  Denies melena, hematochezia, hematuria, epistaxis, or unusual bruising.  States did have a significant amount of blood drainage from right breast mass prior to hospitalization, but was only for one day prior to coming to the hospital.  Discussed the need for anemia workup that can be done outpatient with her new hematology/oncology clinic.          Thank you for your consult. I will follow-up with patient. Please contact us if you have any additional questions.     Jamia Katz NP  Hematology/Oncology  [facility]

## 2019-05-14 NOTE — ASSESSMENT & PLAN NOTE
May 8, 2019  --Outpatient consult to Urology for further workup. No urgent need for inpatient biopsy. Likely slow growing    May 10, 2019  --Patient will be inpatient next few days for radiation for bleeding control.  Consult IR for possible needle biopsy    May 12, 2019  FNA biopsy of kidney mass will be tomorrow with IR.    5/13/19 Awaiting biopsy of kidney mass today.    5/14/19 Biopsy done yesterday afternoon.  Patient tolerated well.  She is aware that she will need to follow up with her oncologist in [place] to discuss results and plan of care.  [facility] with patient's daughter so that they could have access to her records.  Charge nurse, Latrice Moses, sending records to her [facility] cancer center in [place].

## 2019-05-14 NOTE — PROGRESS NOTES
"[facility] Hospital Medicine  Progress Note    Patient Name: Isadora Colin  MRN: [id number]  Patient Class: IP- Inpatient   Admission Date: 5/7/2019  Length of Stay: 7 days  Attending Physician: Danial Price MD  Primary Care Provider: JOLENE PHIPPS III, MD        Subjective:     Principal Problem:Breast mass, right    HPI:  Isadora Colin is a 58 year old female with history of morbid obesity, peripheral edema,chronic venous stasis dermatitis who presented to [facility] on May 6, 2018 for further evaluation of bleeding right breast mass. She recalls noticing the breast mass around 4 weeks ago or "so". This was never evaluated by a physician. She denies fever, abdominal pain, chills, or weight loss. In ED, she was noted hypotensive (79 systolic) without tachycardia which responded to IVF's. Labs reflected lactic acidosis, leukocytosis(23k), and normocytic anemia. Lactic acidosis resolved after IVF's.  CT Chest revealed 8,3 cm right breast mass, 4.7 cm left breast mass, mass involving right chest wall and rib, and surrounding lymph node involvement. She was treated empirically with Zosyn and Vancomycin empirically. Imaging negative for infectious process. [facility] contacted for transfer for further sepsis work up and Oncology consult. She had not further bleeding from protruding breast mass. She has received 2 units of blood. She has never had a mammogram or colonoscopy. She takes Lasix for swelling, but denies having an Echocardiogram.                     Hospital Course:  Isadora Canas is a 58 year old female who was admitted to [facility] for further evaluation of right breast mass and leukocytosis. CT at previous facility shows 8 cm right breast mass, 4 cm left breast mass, and lung/bone/lymph node involvement suspicious for metastatic disease. Oncology consulted who recommended general Surgery consult for biopsy of breast mass. General Surgery " will perform biopsy once she is clinically stable and sepsis rule out or treated. CT abdomen Pelvis now shows 5.4 cm renal mass. Will need outpatient biopsy for this. For leukocytosis, patient was empirically treated with IV Vancomycin and Zosyn. CT chest did not suggest infection. Repeat labs 30k leukocytosis and now down to 20k. She will continue Cefepime and Vancomycin empirically. Infectious Disease consulted. 5/9 - pt took leave and had mammogram and U/S and has returned. WBC normalized and ID suspected leukocytosis was of inflammatory origin. Antibiotics discontinued. Had markings for radiation and will have initial radiation treatments to right breast to prevent further bleeding. Right axillary lymph node biopsy pending. Oncology will arrange follow up appointments in [facility] as pt will discharge home and continue treatments/diagnostic work up there. 5/10 - Radiation treatments in progress for breast mass. Pt will have renal mass biopsy by IR on Monday 5/13 and on 5/14 will have breast/lymph node biopsy at clinic then discharge to home. 5/14/19-Patient underwent 3rd radiation treatment, tolerated well.  Breast/lymph node biopsy at [facility] today.         Interval History: No acute events overnight. Patient underwent 3rd radiation treatment today, tolerated well.  Breast/lymph node biopsy at [facility] today.        Review of Systems   Constitutional: Negative for activity change, diaphoresis, fatigue and unexpected weight change.   HENT: Negative for congestion, ear pain and sore throat.    Eyes: Negative.    Respiratory: Negative for shortness of breath and wheezing.    Cardiovascular: Positive for leg swelling. Negative for chest pain and palpitations.   Gastrointestinal: Negative for abdominal pain, constipation, diarrhea and vomiting.   Endocrine: Negative.    Genitourinary: Negative for flank pain, hematuria and urgency.   Musculoskeletal: Positive for myalgias. Negative for joint swelling and  neck pain.   Skin: Positive for wound. Negative for pallor.             Neurological: Negative for seizures, syncope and light-headedness.   Hematological: Negative.    Psychiatric/Behavioral: Negative for agitation.     Objective:     Vital Signs (Most Recent):  Temp: 98.1 °F (36.7 °C) (05/14/19 0833)  Pulse: 72 (05/14/19 0833)  Resp: 17 (05/14/19 0833)  BP: (!) 99/50 (05/14/19 0833)  SpO2: 100 % (05/14/19 0833) Vital Signs (24h Range):  Temp:  [98.1 °F (36.7 °C)-99 °F (37.2 °C)] 98.1 °F (36.7 °C)  Pulse:  [] 72  Resp:  [16-20] 17  SpO2:  [95 %-100 %] 100 %  BP: ()/(50-63) 99/50     Weight: (!) 149.2 kg (329 lb)  Body mass index is 53.1 kg/m².    Intake/Output Summary (Last 24 hours) at 5/14/2019 1605  Last data filed at 5/13/2019 1712  Gross per 24 hour   Intake 600 ml   Output --   Net 600 ml      Physical Exam   Constitutional: She is oriented to person, place, and time. She appears well-developed and well-nourished.        HENT:   Head: Normocephalic and atraumatic.   Eyes: Conjunctivae and EOM are normal.   Neck: Normal range of motion. Neck supple.   Cardiovascular: Normal rate, regular rhythm and normal heart sounds.   No murmur heard.  Pulmonary/Chest: Effort normal. No respiratory distress. She has rhonchi.        Abdominal: Soft. Bowel sounds are normal. She exhibits no distension. There is no tenderness.   Protuberant - obese     Musculoskeletal: Normal range of motion. She exhibits no edema.   Neurological: She is alert and oriented to person, place, and time.   Skin: Skin is warm and dry.   Venous stasis changes without signs of infection.    Right protruding vascular breast mass. Left palpable breast mass.  - dressing intact to right breast   Psychiatric: Her mood appears anxious. She exhibits a depressed mood.   Vitals reviewed.      Significant Labs:   BMP:   Recent Labs   Lab 05/14/19  0419         K 4.2   CL 93*   CO2 36*   BUN 12   CREATININE 0.7   CALCIUM 9.0     CBC:    Recent Labs   Lab 05/13/19  0541 05/14/19 0419   WBC 11.01 11.10   HGB 8.7* 8.8*   HCT 29.0* 29.7*   * 345     CMP:   Recent Labs   Lab 05/13/19  0541 05/14/19 0419    137   K 3.5 4.2   CL 92* 93*   CO2 34* 36*   * 100   BUN 10 12   CREATININE 0.7 0.7   CALCIUM 8.9 9.0   ANIONGAP 10 8   EGFRNONAA >60 >60     All pertinent labs within the past 24 hours have been reviewed.    Significant Imaging:       Assessment/Plan:      * Breast mass, right  --concerning for malignancy. CT at previous facility showed mass involving bilateral breast, lung, and bone. Will order CT abdomen Pelvis for further evaluation  --Oncology consulted  --General Surgery consulted for tissue biopsy    5/8/19 plans to perform biopsy once sepsis rule out or treatment  5/9/19 - per General Surgery - had diagnostic mammogram and U/S today in clinic. Needs biopsy of right sided lymph nodes  Will have radiation to inflammatory breast area prior to going home to  [place] (near [place]). Oncology will arrange follow up  5/10 - radiation day 2, will have axillary lymph node bx and breast bx Tuesday at [facility]  5/13 - radiation treatments 3/5 to resume  5/14/19-Biopsy of bilateral breast and right axillary lymph node scheduled for today.  Patient had her 3rd dose of radiation to her right breast tumor today              Axillary adenopathy  5/14/19- Biopsy today       Abnormal chest CT - Pulmonary Nodules  There are several scattered tiny right upper lobe pulmonary nodules measuring up to 6 mm on axial image 150 of series 3 highly suspicious for metastatic disease      Breast mass, left  Had diagnostic mammogram at clinic t  Impression:  Left  Mass: Left breast 57 mm x 48 mm mass at the central position. Assessment: 4C - Suspicious finding. Biopsy is recommended.      Right  Mass: Right breast mass at the central position. Assessment: 5 - Highly suggestive of malignancy. Biopsy is recommended.      BI-RADS  Category:   Overall: 5 - Highly Suggestive of Malignancy  5/12 - Right axillary lymph node biopsy on Tuesday 5/14/19-Bilateral breast and right axillary lymph node biopsy today             Left renal mass  5.4 cm mass  Likely a different primary  Will need outpatient biopsy  According to patient - she was aware of kidney mass back from 2014 5/13 - renal biopsy per IR scheduled for today  514/19-Pathology pending, follow-up outpatient       Venous stasis dermatitis of both lower extremities  --No signs of infection  --daily skin care    Morbid obesity  Counseled on need for weight loss      Acute blood loss anemia  --normocytic anemia s/p 2 units PRBC's   --could be related to malignancy  --has never had colonoscopy    5/9/19  Hemoglobin 8.2. Down from 9. Closely monitor  5/10 - Hgb 7.8, if continues to fall, may need blood transfusion if continues to fall  5/13 - Hgb 8.7/Hct 29  5/14/19-Hgb 8.8, stable         VTE Risk Mitigation (From admission, onward)    None              Lynn Son NP  Department of Hospital Medicine   [facility]

## 2019-05-14 NOTE — PROGRESS NOTES
[facility] -   General Surgery  Progress Note    Subjective:     History of Present Illness:  The patient was accepted in transfer from the [facility] for a fungating bleeding right breast mass that was suspicious for breast cancer.  She underwent a CT scan of her chest that showed a right and left breast masses, right axillary lymph nodes and likely metastatic disease.  She was admitted by the medical service and surgery Oncology consult were obtained.  The patient had a CT scan of her abdomen that showed a renal mass.    The patient states she noticed a breast mass about 4 weeks ago.  She says it bleeds if it rubs on her clothes.  It is not associated with a significant pain.    Post-Op Info:  * No surgery found *         Interval History:  For bilateral breast and right axillary lymph node biopsy today    Medications:  Continuous Infusions:  Scheduled Meds:   furosemide  40 mg Intravenous Daily    miconazole nitrate 2%   Topical (Top) BID    potassium chloride  20 mEq Oral Daily     PRN Meds:cyclobenzaprine, HYDROcodone-acetaminophen, hydrOXYzine HCl, LORazepam, ramelteon     Review of patient's allergies indicates:  No Known Allergies  Objective:     Vital Signs (Most Recent):  Temp: 98.8 °F (37.1 °C) (05/14/19 0401)  Pulse: 72 (05/14/19 0401)  Resp: 16 (05/14/19 0401)  BP: (!) 110/51 (05/14/19 0401)  SpO2: 98 % (05/14/19 0650) Vital Signs (24h Range):  Temp:  [98.2 °F (36.8 °C)-99 °F (37.2 °C)] 98.8 °F (37.1 °C)  Pulse:  [] 72  Resp:  [15-20] 16  SpO2:  [92 %-100 %] 98 %  BP: (110-141)/(50-71) 110/51     Weight: (!) 149.2 kg (329 lb)  Body mass index is 53.1 kg/m².    Intake/Output - Last 3 Shifts       05/12 0700 - 05/13 0659 05/13 0700 - 05/14 0659 05/14 0700 - 05/15 0659    P.O. 2020 600     Total Intake(mL/kg) 2020 (13.5) 600 (4)     Urine (mL/kg/hr) 400 (0.1)      Stool       Total Output 400      Net +1620 +600            Urine Occurrence 7 x 7 x     Stool Occurrence 2 x 1 x            Physical Exam   Constitutional: No distress.   Morbidly obese   Cardiovascular: Normal rate, regular rhythm and normal heart sounds.   Pulmonary/Chest: Effort normal and breath sounds normal.   Abdominal: Soft.   Obese   Skin:   Previous breast exam showed bilateral breast masses and right axillary adenopathy   Vitals reviewed.      Significant Labs:  CBC:   Recent Labs   Lab 05/14/19 0419   WBC 11.10   RBC 3.25*   HGB 8.8*   HCT 29.7*      MCV 91   MCH 27.1   MCHC 29.6*     BMP:   Recent Labs   Lab 05/14/19 0419         K 4.2   CL 93*   CO2 36*   BUN 12   CREATININE 0.7   CALCIUM 9.0     CMP:   Recent Labs   Lab 05/07/19  1715  05/14/19 0419   *   < > 100   CALCIUM 8.9   < > 9.0   ALBUMIN 2.4*  --   --    PROT 6.3  --   --    *   < > 137   K 4.2   < > 4.2   CO2 24   < > 36*      < > 93*   BUN 17   < > 12   CREATININE 0.9   < > 0.7   ALKPHOS 90  --   --    ALT 14  --   --    AST 27  --   --    BILITOT 0.6  --   --     < > = values in this interval not displayed.       Significant Diagnostics:  CT from kidney biopsy reviewed    Assessment/Plan:     * Breast mass, right  Highly concerning for breast cancer.  On imaging.  There is also enlarged axillary nodes.    The plan is for her to go to the [facility] facility today for a scheduled biopsy at noon     Axillary adenopathy  Ultrasound-guided biopsy today    Abnormal chest CT - Pulmonary Nodules  Likely represents metastatic disease    Breast mass, left  Concerning for breast cancer on mammogram and ultrasound.    Biopsies of the left breast as well as the right breast and right axillary abnormalities are planned for today at the [facility] at approximately noon.    The patient will need to be given a travel past for this    Left renal mass  Further management and workup per Oncology/hospital Medicine  CT biopsy complete.  Await pathology    Venous stasis dermatitis of both lower extremities  Management per Medicine    Morbid  obesity  This should be addressed with her primary care doctor after discharge    Leukocytosis  Likely secondary to the malignancy     Once the biopsies have been completed in all biopsy/post biopsy imaging has been done the patient can then be provided with a disc of all her imaging studies done at this facility    Rufus Murphy MD  General Surgery  [facility] -

## 2019-05-14 NOTE — SUBJECTIVE & OBJECTIVE
Interval History: Resting comfortable, afebrile. Daughter at bedside during assessment. Awaiting 3rd radiation treatment.  To have breast biopsy today at [facility].      Oncology Treatment Plan:   [No treatment plan]    Medications:  Continuous Infusions:  Scheduled Meds:   furosemide  40 mg Intravenous Daily    miconazole nitrate 2%   Topical (Top) BID    potassium chloride  20 mEq Oral Daily     PRN Meds:cyclobenzaprine, HYDROcodone-acetaminophen, hydrOXYzine HCl, LORazepam, ramelteon     Review of Systems   Constitutional: Positive for activity change. Negative for fever.   HENT: Negative.    Eyes: Negative.    Respiratory: Negative.    Cardiovascular: Negative.    Gastrointestinal: Negative.    Endocrine: Negative.    Genitourinary: Negative.    Musculoskeletal: Negative.    Skin: Positive for rash and wound.   Allergic/Immunologic: Positive for immunocompromised state.   Neurological: Negative.    Hematological: Negative.    Psychiatric/Behavioral: Negative.      Objective:     Vital Signs (Most Recent):  Temp: 98.1 °F (36.7 °C) (05/14/19 0833)  Pulse: 72 (05/14/19 0833)  Resp: 17 (05/14/19 0833)  BP: (!) 99/50 (05/14/19 0833)  SpO2: 100 % (05/14/19 0833) Vital Signs (24h Range):  Temp:  [98.1 °F (36.7 °C)-99 °F (37.2 °C)] 98.1 °F (36.7 °C)  Pulse:  [] 72  Resp:  [15-20] 17  SpO2:  [92 %-100 %] 100 %  BP: ()/(50-71) 99/50     Weight: (!) 149.2 kg (329 lb)  Body mass index is 53.1 kg/m².  Body surface area is 2.64 meters squared.      Intake/Output Summary (Last 24 hours) at 5/14/2019 0954  Last data filed at 5/13/2019 1712  Gross per 24 hour   Intake 600 ml   Output --   Net 600 ml       Physical Exam   Constitutional: She is oriented to person, place, and time. She appears well-developed and well-nourished.   HENT:   Head: Normocephalic and atraumatic.   Eyes: Conjunctivae and EOM are normal.   Neck: Normal range of motion. Neck supple.   Cardiovascular: Normal rate and regular rhythm.    Pulmonary/Chest: Effort normal and breath sounds normal.   Abdominal: Soft. Bowel sounds are normal.   Musculoskeletal: Normal range of motion.   Neurological: She is alert and oriented to person, place, and time.   Skin: Skin is warm and dry. Rash noted.   Psychiatric: Her speech is normal and behavior is normal. Judgment and thought content normal. Her mood appears anxious. Cognition and memory are normal. She exhibits a depressed mood. She is attentive.   Nursing note and vitals reviewed.      Significant Labs:   All pertinent labs from the last 24 hours have been reviewed.    Diagnostic Results:  I have reviewed all pertinent imaging results/findings within the past 24 hours.

## 2019-05-14 NOTE — NURSING
Patient transported back to [facility] per [facility], in no acute distress accompanied by her daughter.

## 2019-05-14 NOTE — CONSULTS
Medical record release obtained by med/surg nurse.  Emilia ESPINOZA will forward the release to medical records for records to be sent to cancer center in [facility] for outpatient follow up

## 2019-05-14 NOTE — SUBJECTIVE & OBJECTIVE
Interval History:  For bilateral breast and right axillary lymph node biopsy today    Medications:  Continuous Infusions:  Scheduled Meds:   furosemide  40 mg Intravenous Daily    miconazole nitrate 2%   Topical (Top) BID    potassium chloride  20 mEq Oral Daily     PRN Meds:cyclobenzaprine, HYDROcodone-acetaminophen, hydrOXYzine HCl, LORazepam, ramelteon     Review of patient's allergies indicates:  No Known Allergies  Objective:     Vital Signs (Most Recent):  Temp: 98.8 °F (37.1 °C) (05/14/19 0401)  Pulse: 72 (05/14/19 0401)  Resp: 16 (05/14/19 0401)  BP: (!) 110/51 (05/14/19 0401)  SpO2: 98 % (05/14/19 0650) Vital Signs (24h Range):  Temp:  [98.2 °F (36.8 °C)-99 °F (37.2 °C)] 98.8 °F (37.1 °C)  Pulse:  [] 72  Resp:  [15-20] 16  SpO2:  [92 %-100 %] 98 %  BP: (110-141)/(50-71) 110/51     Weight: (!) 149.2 kg (329 lb)  Body mass index is 53.1 kg/m².    Intake/Output - Last 3 Shifts       05/12 0700 - 05/13 0659 05/13 0700 - 05/14 0659 05/14 0700 - 05/15 0659    P.O. 2020 600     Total Intake(mL/kg) 2020 (13.5) 600 (4)     Urine (mL/kg/hr) 400 (0.1)      Stool       Total Output 400      Net +1620 +600            Urine Occurrence 7 x 7 x     Stool Occurrence 2 x 1 x           Physical Exam   Constitutional: No distress.   Morbidly obese   Cardiovascular: Normal rate, regular rhythm and normal heart sounds.   Pulmonary/Chest: Effort normal and breath sounds normal.   Abdominal: Soft.   Obese   Skin:   Previous breast exam showed bilateral breast masses and right axillary adenopathy   Vitals reviewed.      Significant Labs:  CBC:   Recent Labs   Lab 05/14/19 0419   WBC 11.10   RBC 3.25*   HGB 8.8*   HCT 29.7*      MCV 91   MCH 27.1   MCHC 29.6*     BMP:   Recent Labs   Lab 05/14/19 0419         K 4.2   CL 93*   CO2 36*   BUN 12   CREATININE 0.7   CALCIUM 9.0     CMP:   Recent Labs   Lab 05/07/19  1715  05/14/19  0414   *   < > 100   CALCIUM 8.9   < > 9.0   ALBUMIN 2.4*  --   --     PROT 6.3  --   --    *   < > 137   K 4.2   < > 4.2   CO2 24   < > 36*      < > 93*   BUN 17   < > 12   CREATININE 0.9   < > 0.7   ALKPHOS 90  --   --    ALT 14  --   --    AST 27  --   --    BILITOT 0.6  --   --     < > = values in this interval not displayed.       Significant Diagnostics:  CT from kidney biopsy reviewed

## 2019-05-14 NOTE — PLAN OF CARE
Problem: Adult Inpatient Plan of Care  Goal: Plan of Care Review  Outcome: Ongoing (interventions implemented as appropriate)  POC reviewed, including indications and possible side effects of administered medications. Patient verbalized understanding and teach back. No adverse reactions noted. Patient c/o breast pain and itching. Administered medications per order. Dressings remain CDI. NPO at midnight. No s/s of acute distress noted. Daughter at bedside. Will continue to monitor.    Chart check complete.

## 2019-05-14 NOTE — ASSESSMENT & PLAN NOTE
--normocytic anemia s/p 2 units PRBC's   --could be related to malignancy  --has never had colonoscopy    5/9/19  Hemoglobin 8.2. Down from 9. Closely monitor  5/10 - Hgb 7.8, if continues to fall, may need blood transfusion if continues to fall  5/13 - Hgb 8.7/Hct 29  5/14/19-Hgb 8.8, stable

## 2019-05-14 NOTE — ASSESSMENT & PLAN NOTE
May 8, 2019  --General surgery consult for breast mass biopsy. Gen surg would like to r/o sepsis prior to proceeding  --I spoke with Dr. Cunha, Rad/Onc who agree to see patient to determine possible radiation options  --CT Chest/Abdomen/Pelvis concerning for metastatic disease. Likely more than one primary. We will need biopsy results and further staging to determine treatment recommendations  --hemoglobin 8.1. No urgent need for additional blood transfusion. Consider PRBCs transfusion for hg < 8. WBC 20.1, likely reactive. No clear infectious source identified. ID consulted. Await ID recs  --BMP unremarkable  --Continue supportive care    May 12, 2019 biopsy of breast mass and axillary lymph node will be in clinic on Tuesday.  Mrs Colin will get name of medical oncologist in [place] who took care of her mother who had uterine cancer. Once have name of oncologist will make phone call to get clinic appointment.    May 11th  CT guided biopsy of renal mass has been arranged with Radiology for Monday May 13th.  Biopsy of breast and axillary mass will be done on Tuesday May 14th in outpatient clinic at [facility].  Have asked Mrs. Colin to let me know the name of medical oncologist in [place] so we can coordinate oncology care.  Mrs. Colin would like to be treated in [place] as it is much closer to home, where she lives with her mother.  Her daughter lives in [place], but has roommates and so she cannot stay and get care in [place].    May 9, 2019-Unable to perform breast biopsy/imaging inpatient, however it has been arranged for patient to be transferred to [facility] outpatient clinic to have biopsy performed today. She will then transfer back to hospital to complete radiation x 5 days to the right chest wall per Rad/Onc in hopes to control bleeding from right breast mass. Patient will then need to be arranged for outpatient follow up for further treatment recommendations. Hemoglobin 8.2. No urgent  indication for blood transfusion. Transfuse if hemoglobin <8. CBC otherwise unremarkable. We will hold off on Kidney mass workup as this can be done outpatient. Patient reports known history of renal mass since 2014. This is likely slow growing and will likely not be an acute issue for this patient. Patient with known difficult social situation. She is likely to need care closer to her home town which is in [place], [place]. No family here in [place] that can provide her with living arrangements. Recommend social work consult to see if they could be of any assistance to patient.     May 10, 2019--Patient did have bilateral breast US and bilateral mammogram on yesterday at [facility]. Highly suspicious for malignancy. Needs biopsies. IR consult placed today for possible axillary node biopsy and renal mass biopsy to help expedite diagnosing and treatment options for patient. Patient has received 1 of 5 planned inpatient radiation treatment in hopes to control bleeding of right breast tumor. Hemoglobin 7.8. Patient asymptomatic. No urgent need for blood transfusion. Transfuse if hemoglobin < 7 or anemia symptoms. Patient plans to seek follow up care post discharge with Oncologist near home who previously treated her mother for cancer. She is unsure of the MDs exact name. We will be happy to assist with transferring records as necessary    5/13/19 Patient to have biopsy of breast mass/axillary outpatient tomorrow.  She is to have day 3 of 5 day scheduled radiation today.  She states that she would like have cancer treatment continued in [place].  She states the name of facility to send records to is [facility] in [place] - phone number [phone].  She states would like Dr. Dayron Vargas to receive records; however is unsure if he is still practicing.  If not practicing would like records to still go to this center and would choose an oncologist within the practice.  Charge nurse on 5th floor is to  have records sent.      5/14/19 Biopsy of right breast scheduled for today.  Patient had her 3rd dose of radiation to her right breast tumor today.  States no longer oozing.  Discussed the need to schedule an appointment with her oncologist of choice at the [facility] in [place] on next Friday to discuss results and plan of care.  Her and her daughter verbalized understanding.  She was given Dr. Pepper's care and phone number in case of any questions.  Charge nurse sending records to her new oncologist's office.

## 2019-05-14 NOTE — ASSESSMENT & PLAN NOTE
Had diagnostic mammogram at clinic t  Impression:  Left  Mass: Left breast 57 mm x 48 mm mass at the central position. Assessment: 4C - Suspicious finding. Biopsy is recommended.      Right  Mass: Right breast mass at the central position. Assessment: 5 - Highly suggestive of malignancy. Biopsy is recommended.      BI-RADS Category:   Overall: 5 - Highly Suggestive of Malignancy  5/12 - Right axillary lymph node biopsy on Tuesday 5/14/19-Bilateral breast and right axillary lymph node biopsy today

## 2019-05-14 NOTE — ASSESSMENT & PLAN NOTE
5.4 cm mass  Likely a different primary  Will need outpatient biopsy  According to patient - she was aware of kidney mass back from 2014 5/13 - renal biopsy per IR scheduled for today  514/19-Pathology pending, follow-up outpatient

## 2019-05-14 NOTE — ASSESSMENT & PLAN NOTE
--concerning for malignancy. CT at previous facility showed mass involving bilateral breast, lung, and bone. Will order CT abdomen Pelvis for further evaluation  --Oncology consulted  --General Surgery consulted for tissue biopsy    5/8/19 plans to perform biopsy once sepsis rule out or treatment  5/9/19 - per General Surgery - had diagnostic mammogram and U/S today in clinic. Needs biopsy of right sided lymph nodes  Will have radiation to inflammatory breast area prior to going home to  [place] (near [place]). Oncology will arrange follow up  5/10 - radiation day 2, will have axillary lymph node bx and breast bx Tuesday at [facility]  5/13 - radiation treatments 3/5 to resume  5/14/19-Biopsy of bilateral breast and right axillary lymph node scheduled for today.  Patient had her 3rd dose of radiation to her right breast tumor today

## 2019-05-15 ENCOUNTER — TELEPHONE (OUTPATIENT)
Dept: HEMATOLOGY/ONCOLOGY | Facility: CLINIC | Age: 59
End: 2019-05-15

## 2019-05-15 VITALS
DIASTOLIC BLOOD PRESSURE: 73 MMHG | TEMPERATURE: 98 F | OXYGEN SATURATION: 92 % | WEIGHT: 293 LBS | SYSTOLIC BLOOD PRESSURE: 157 MMHG | HEART RATE: 93 BPM | RESPIRATION RATE: 18 BRPM | BODY MASS INDEX: 47.09 KG/M2 | HEIGHT: 66 IN

## 2019-05-15 PROBLEM — D62 ACUTE BLOOD LOSS ANEMIA: Status: RESOLVED | Noted: 2019-05-07 | Resolved: 2019-05-15

## 2019-05-15 LAB
ANION GAP SERPL CALC-SCNC: 9 MMOL/L (ref 8–16)
BASOPHILS # BLD AUTO: 0.01 K/UL (ref 0–0.2)
BASOPHILS NFR BLD: 0.1 % (ref 0–1.9)
BUN SERPL-MCNC: 13 MG/DL (ref 6–20)
CALCIUM SERPL-MCNC: 9 MG/DL (ref 8.7–10.5)
CHLORIDE SERPL-SCNC: 95 MMOL/L (ref 95–110)
CO2 SERPL-SCNC: 33 MMOL/L (ref 23–29)
CREAT SERPL-MCNC: 0.7 MG/DL (ref 0.5–1.4)
DIFFERENTIAL METHOD: ABNORMAL
EOSINOPHIL # BLD AUTO: 0.2 K/UL (ref 0–0.5)
EOSINOPHIL NFR BLD: 2.5 % (ref 0–8)
ERYTHROCYTE [DISTWIDTH] IN BLOOD BY AUTOMATED COUNT: 15.4 % (ref 11.5–14.5)
EST. GFR  (AFRICAN AMERICAN): >60 ML/MIN/1.73 M^2
EST. GFR  (NON AFRICAN AMERICAN): >60 ML/MIN/1.73 M^2
FERRITIN SERPL-MCNC: 300 NG/ML (ref 20–300)
GLUCOSE SERPL-MCNC: 107 MG/DL (ref 70–110)
HCT VFR BLD AUTO: 29.1 % (ref 37–48.5)
HGB BLD-MCNC: 8.6 G/DL (ref 12–16)
IRON SERPL-MCNC: 40 UG/DL (ref 30–160)
LYMPHOCYTES # BLD AUTO: 1 K/UL (ref 1–4.8)
LYMPHOCYTES NFR BLD: 11.6 % (ref 18–48)
MCH RBC QN AUTO: 27.5 PG (ref 27–31)
MCHC RBC AUTO-ENTMCNC: 29.6 G/DL (ref 32–36)
MCV RBC AUTO: 93 FL (ref 82–98)
MONOCYTES # BLD AUTO: 0.8 K/UL (ref 0.3–1)
MONOCYTES NFR BLD: 8.6 % (ref 4–15)
NEUTROPHILS # BLD AUTO: 6.9 K/UL (ref 1.8–7.7)
NEUTROPHILS NFR BLD: 77.2 % (ref 38–73)
PLATELET # BLD AUTO: 302 K/UL (ref 150–350)
PMV BLD AUTO: 9.6 FL (ref 9.2–12.9)
POTASSIUM SERPL-SCNC: 3.8 MMOL/L (ref 3.5–5.1)
RBC # BLD AUTO: 3.13 M/UL (ref 4–5.4)
SATURATED IRON: 18 % (ref 20–50)
SODIUM SERPL-SCNC: 137 MMOL/L (ref 136–145)
TOTAL IRON BINDING CAPACITY: 225 UG/DL (ref 250–450)
TRANSFERRIN SERPL-MCNC: 152 MG/DL (ref 200–375)
WBC # BLD AUTO: 8.97 K/UL (ref 3.9–12.7)

## 2019-05-15 PROCEDURE — 25000003 PHARM REV CODE 250: Performed by: NURSE PRACTITIONER

## 2019-05-15 PROCEDURE — 99231 PR SUBSEQUENT HOSPITAL CARE,LEVL I: ICD-10-PCS | Mod: ,,, | Performed by: SURGERY

## 2019-05-15 PROCEDURE — 77387 GUIDANCE FOR RADJ TX DLVR: CPT | Mod: TC | Performed by: RADIOLOGY

## 2019-05-15 PROCEDURE — 77412 RADIATION TX DELIVERY LVL 3: CPT | Performed by: RADIOLOGY

## 2019-05-15 PROCEDURE — 36415 COLL VENOUS BLD VENIPUNCTURE: CPT

## 2019-05-15 PROCEDURE — 99233 SBSQ HOSP IP/OBS HIGH 50: CPT | Mod: ,,, | Performed by: INTERNAL MEDICINE

## 2019-05-15 PROCEDURE — 99231 SBSQ HOSP IP/OBS SF/LOW 25: CPT | Mod: ,,, | Performed by: SURGERY

## 2019-05-15 PROCEDURE — 99233 PR SUBSEQUENT HOSPITAL CARE,LEVL III: ICD-10-PCS | Mod: ,,, | Performed by: INTERNAL MEDICINE

## 2019-05-15 PROCEDURE — G6002 STEREOSCOPIC X-RAY GUIDANCE: HCPCS | Mod: 26,,, | Performed by: RADIOLOGY

## 2019-05-15 PROCEDURE — 82728 ASSAY OF FERRITIN: CPT

## 2019-05-15 PROCEDURE — 63600175 PHARM REV CODE 636 W HCPCS: Performed by: NURSE PRACTITIONER

## 2019-05-15 PROCEDURE — 94761 N-INVAS EAR/PLS OXIMETRY MLT: CPT

## 2019-05-15 PROCEDURE — G6002 PR STEREOSCOPIC XRAY GUIDE FOR RADIATION TX DELIV: ICD-10-PCS | Mod: 26,,, | Performed by: RADIOLOGY

## 2019-05-15 PROCEDURE — 83540 ASSAY OF IRON: CPT

## 2019-05-15 PROCEDURE — 85025 COMPLETE CBC W/AUTO DIFF WBC: CPT

## 2019-05-15 PROCEDURE — 80048 BASIC METABOLIC PNL TOTAL CA: CPT

## 2019-05-15 RX ORDER — HYDROCODONE BITARTRATE AND ACETAMINOPHEN 10; 325 MG/1; MG/1
1 TABLET ORAL EVERY 4 HOURS PRN
Qty: 30 TABLET | Refills: 0 | Status: SHIPPED | OUTPATIENT
Start: 2019-05-15

## 2019-05-15 RX ORDER — HYDROXYZINE HYDROCHLORIDE 25 MG/1
25 TABLET, FILM COATED ORAL 3 TIMES DAILY PRN
Qty: 15 TABLET | Refills: 0 | Status: SHIPPED | OUTPATIENT
Start: 2019-05-15

## 2019-05-15 RX ORDER — LORAZEPAM 0.5 MG/1
0.5 TABLET ORAL EVERY 12 HOURS PRN
Qty: 15 TABLET | Refills: 0 | Status: SHIPPED | OUTPATIENT
Start: 2019-05-15

## 2019-05-15 RX ORDER — POTASSIUM CHLORIDE 20 MEQ/1
20 TABLET, EXTENDED RELEASE ORAL DAILY
Qty: 30 TABLET | Refills: 0 | Status: SHIPPED | OUTPATIENT
Start: 2019-05-16

## 2019-05-15 RX ADMIN — HYDROXYZINE HYDROCHLORIDE 25 MG: 25 TABLET, FILM COATED ORAL at 01:05

## 2019-05-15 RX ADMIN — MICONAZOLE NITRATE: 2 OINTMENT TOPICAL at 09:05

## 2019-05-15 RX ADMIN — POTASSIUM CHLORIDE 20 MEQ: 1500 TABLET, EXTENDED RELEASE ORAL at 09:05

## 2019-05-15 RX ADMIN — FUROSEMIDE 40 MG: 10 INJECTION, SOLUTION INTRAVENOUS at 09:05

## 2019-05-15 RX ADMIN — HYDROCODONE BITARTRATE AND ACETAMINOPHEN 1 TABLET: 10; 325 TABLET ORAL at 12:05

## 2019-05-15 RX ADMIN — HYDROCODONE BITARTRATE AND ACETAMINOPHEN 1 TABLET: 10; 325 TABLET ORAL at 06:05

## 2019-05-15 RX ADMIN — HYDROCODONE BITARTRATE AND ACETAMINOPHEN 1 TABLET: 10; 325 TABLET ORAL at 01:05

## 2019-05-15 RX ADMIN — HYDROXYZINE HYDROCHLORIDE 25 MG: 25 TABLET, FILM COATED ORAL at 09:05

## 2019-05-15 NOTE — NURSING
D/C orders given to patient and daughter. Verbalized understanding Denies questions or needs. Awaiting transportation. Scripts given to patient.

## 2019-05-15 NOTE — ASSESSMENT & PLAN NOTE
Biopsy completed.  Pathology pending.  Pathology information will be forwarded to her treating doctors in [facility]

## 2019-05-15 NOTE — ASSESSMENT & PLAN NOTE
"Physical Therapy Treatment completed.   Bed Mobility:  Supine to Sit: Supervised  Transfers: Sit to Stand: Supervised  Gait: Level Of Assist: Supervised with Front-Wheel Walker       Plan of Care: Will benefit from Physical Therapy 3 times per week  Discharge Recommendations: Equipment: No Equipment Needed. Post-acute therapy Discharge to home with outpatient or home health for additional skilled therapy services.     See \"Rehab Therapy-Acute\" Patient Summary Report for complete documentation.     Pt with recent fall in her room, reports she got tangled in her sheets when trying to get to EOB to patt slippers. Pt with some minor safety concerns such as leaving AD behind during transfers or when performing standing activity. Pt easily corrects with verbal cuing. PT to continue to work with Pt 1-2 more times to address safety, but Pt overall is mobilizing very well.   " May 8, 2019  --General surgery consult for breast mass biopsy. Gen surg would like to r/o sepsis prior to proceeding  --I spoke with Dr. Cunha, Rad/Onc who agree to see patient to determine possible radiation options  --CT Chest/Abdomen/Pelvis concerning for metastatic disease. Likely more than one primary. We will need biopsy results and further staging to determine treatment recommendations  --hemoglobin 8.1. No urgent need for additional blood transfusion. Consider PRBCs transfusion for hg < 8. WBC 20.1, likely reactive. No clear infectious source identified. ID consulted. Await ID recs  --BMP unremarkable  --Continue supportive care    May 12, 2019 biopsy of breast mass and axillary lymph node will be in clinic on Tuesday.  Mrs Colin will get name of medical oncologist in [place] who took care of her mother who had uterine cancer. Once have name of oncologist will make phone call to get clinic appointment.    May 11th  CT guided biopsy of renal mass has been arranged with Radiology for Monday May 13th.  Biopsy of breast and axillary mass will be done on Tuesday May 14th in outpatient clinic at [facility].  Have asked Mrs. Colin to let me know the name of medical oncologist in [place] so we can coordinate oncology care.  Mrs. Colin would like to be treated in [place] as it is much closer to home, where she lives with her mother.  Her daughter lives in [place], but has roommates and so she cannot stay and get care in [place].    May 9, 2019-Unable to perform breast biopsy/imaging inpatient, however it has been arranged for patient to be transferred to [facility] outpatient clinic to have biopsy performed today. She will then transfer back to hospital to complete radiation x 5 days to the right chest wall per Rad/Onc in hopes to control bleeding from right breast mass. Patient will then need to be arranged for outpatient follow up for further treatment recommendations. Hemoglobin 8.2. No urgent  indication for blood transfusion. Transfuse if hemoglobin <8. CBC otherwise unremarkable. We will hold off on Kidney mass workup as this can be done outpatient. Patient reports known history of renal mass since 2014. This is likely slow growing and will likely not be an acute issue for this patient. Patient with known difficult social situation. She is likely to need care closer to her home town which is in [place], [place]. No family here in [place] that can provide her with living arrangements. Recommend social work consult to see if they could be of any assistance to patient.     May 10, 2019--Patient did have bilateral breast US and bilateral mammogram on yesterday at [facility]. Highly suspicious for malignancy. Needs biopsies. IR consult placed today for possible axillary node biopsy and renal mass biopsy to help expedite diagnosing and treatment options for patient. Patient has received 1 of 5 planned inpatient radiation treatment in hopes to control bleeding of right breast tumor. Hemoglobin 7.8. Patient asymptomatic. No urgent need for blood transfusion. Transfuse if hemoglobin < 7 or anemia symptoms. Patient plans to seek follow up care post discharge with Oncologist near home who previously treated her mother for cancer. She is unsure of the MDs exact name. We will be happy to assist with transferring records as necessary    5/13/19 Patient to have biopsy of breast mass/axillary outpatient tomorrow.  She is to have day 3 of 5 day scheduled radiation today.  She states that she would like have cancer treatment continued in [place].  She states the name of facility to send records to is [facility] in [place] - phone number [phone].  She states would like Dr. Dayron Vargas to receive records; however is unsure if he is still practicing.  If not practicing would like records to still go to this center and would choose an oncologist within the practice.  Charge nurse on 5th floor is to  have records sent.      5/14/19 Biopsy of right breast scheduled for today.  Patient had her 3rd dose of radiation to her right breast tumor today.  States no longer oozing.  Discussed the need to schedule an appointment with her oncologist of choice at the [facility] in [place] on next Friday to discuss results and plan of care.  Her and her daughter verbalized understanding.  She was given Dr. Pepper's care and phone number in case of any questions.  Charge nurse sending records to her new oncologist's office.      5/15/19 Had biopsy of bilateral breast yesterday as well as axillary lymph node biopsy.  Had 4th dose of radiation to right breast today.  Daughter and patient request to be discharged today and understand that will not have the previously scheduled 5th dose radiation treatment due to discharge.  Daughter is to call and schedule an appointment with oncologist at [facility] in [place], [place] per patient's request.  Daughter was told a referral was needed in order to schedule an appointment.  Will take care of this.  She is to follow up for results of breast and kidney biopsy next Friday with her outpatient oncologist - to be determined for continued treatment.  Dr. Pepper's business card with personal cell phone number was provided to the patient per Dr. Pepper's request in case the patient had questions.

## 2019-05-15 NOTE — TELEPHONE ENCOUNTER
Hematology/oncology referral faxed to hematology/oncology [facility] in [place] per Jamia Katz.

## 2019-05-15 NOTE — ASSESSMENT & PLAN NOTE
Concerning for breast cancer on mammogram and ultrasound.    Biopsy completed.  Pathology pending.  Pathology information will be forwarded to her treating doctors in [facility]

## 2019-05-15 NOTE — PROGRESS NOTES
Pt plans to follow up with [facility] as outpatient once dc'd from the hospital.  The address is:   [address] [place] [address], [address]   [place] [id number]

## 2019-05-15 NOTE — PLAN OF CARE
Problem: Adult Inpatient Plan of Care  Goal: Plan of Care Review  Outcome: Ongoing (interventions implemented as appropriate)  Patient remained free from injury. PRN pain meds managed pain. Ambulates to [facility]SC. No s/s of acute distress. 12hr chart check complete.

## 2019-05-15 NOTE — PROGRESS NOTES
[facility]  Hematology/Oncology  Progress Note    Patient Name: Isadora Colin  Admission Date: 5/7/2019  Hospital Length of Stay: 8 days  Code Status: No Order     Subjective:     HPI:  58 y.o female history of morbid obesity, peripheral edema (on Lasix at home),chronic venous stasis dermatitis who presented to [facility] on May 6, 2018 for further evaluation of bleeding right breast mass which patient reports onset approximately 4 weeks ago and has progressively gotten larger.  Associated symptoms include right breast pain.  Patient denies any other symptoms. She denies CP, SOB, bowel or urinary complaints, fever, abdominal pain, chills, or weight loss.     At [facility] in ED, she was noted hypotensive (79 systolic) without tachycardia which responded to IVF's. Labs reflected lactic acidosis, leukocytosis(23k), and normocytic anemia Hg 9.3. Lactic acidosis resolved after IVF's.  CT Chest revealed 8.3 cm right breast mass, 4.7 cm left breast mass, mass involving right chest wall and rib, and surrounding lymph node involvement. She was treated empirically with Zosyn and Vancomycin IV abx. Imaging negative for infectious process.     Patient was transferred to [facility] for higher level care. She has received 2 units of blood. She has never had a mammogram or colonoscopy. Does not have regular PCP.     CT Abdomen/Pelvis done at [facility] revealed 5.4 cm renal mass. ID consulted for elevated WBC. General surgery consulted for mass biopsy. General surgery awaiting r/o sepsis to proceed with biopsy               Interval History: Resting comfortable, afebrile. Daughter at bedside during assessment. Awaiting 4th radiation treatment.  Daughter requesting for patient to be discharged today - patient is in agreement and understands that she will not receive her previously scheduled 5th dose of radiation.    Oncology Treatment Plan:   [No treatment  plan]    Medications:  Continuous Infusions:  Scheduled Meds:   furosemide  40 mg Intravenous Daily    miconazole nitrate 2%   Topical (Top) BID    potassium chloride  20 mEq Oral Daily     PRN Meds:cyclobenzaprine, HYDROcodone-acetaminophen, hydrOXYzine HCl, LORazepam, ramelteon     Review of Systems   Constitutional: Positive for activity change. Negative for fever.   HENT: Negative.    Eyes: Negative.    Respiratory: Negative.    Cardiovascular: Negative.    Gastrointestinal: Negative.    Endocrine: Negative.    Genitourinary: Negative.    Musculoskeletal: Negative.    Skin: Positive for rash and wound.   Allergic/Immunologic: Positive for immunocompromised state.   Neurological: Negative.    Hematological: Negative.    Psychiatric/Behavioral: Negative.      Objective:     Vital Signs (Most Recent):  Temp: 98.8 °F (37.1 °C) (05/15/19 0715)  Pulse: 74 (05/15/19 0736)  Resp: 19 (05/15/19 0715)  BP: 123/74 (05/15/19 0715)  SpO2: (!) 94 % (05/15/19 0736) Vital Signs (24h Range):  Temp:  [97.6 °F (36.4 °C)-98.8 °F (37.1 °C)] 98.8 °F (37.1 °C)  Pulse:  [69-82] 74  Resp:  [18-19] 19  SpO2:  [94 %-100 %] 94 %  BP: (116-133)/(56-74) 123/74     Weight: (!) 149.2 kg (329 lb)  Body mass index is 53.1 kg/m².  Body surface area is 2.64 meters squared.      Intake/Output Summary (Last 24 hours) at 5/15/2019 1055  Last data filed at 5/15/2019 1042  Gross per 24 hour   Intake 180 ml   Output 1 ml   Net 179 ml       Physical Exam   Constitutional: She is oriented to person, place, and time. She appears well-developed and well-nourished.   HENT:   Head: Normocephalic and atraumatic.   Eyes: Conjunctivae and EOM are normal.   Neck: Normal range of motion. Neck supple.   Cardiovascular: Normal rate and regular rhythm.   Pulmonary/Chest: Effort normal and breath sounds normal.   Abdominal: Soft. Bowel sounds are normal.   Musculoskeletal: Normal range of motion.   Neurological: She is alert and oriented to person, place, and time.    Skin: Skin is warm and dry. Rash noted.   Psychiatric: Her speech is normal and behavior is normal. Judgment and thought content normal. Her mood appears anxious. Cognition and memory are normal. She exhibits a depressed mood. She is attentive.   Nursing note and vitals reviewed.      Significant Labs:   All pertinent labs from the last 24 hours have been reviewed.    Diagnostic Results:  I have reviewed all pertinent imaging results/findings within the past 24 hours.    Assessment/Plan:     * Breast mass, right  May 8, 2019  --General surgery consult for breast mass biopsy. Gen surg would like to r/o sepsis prior to proceeding  --I spoke with Dr. Cunha, Rad/Onc who agree to see patient to determine possible radiation options  --CT Chest/Abdomen/Pelvis concerning for metastatic disease. Likely more than one primary. We will need biopsy results and further staging to determine treatment recommendations  --hemoglobin 8.1. No urgent need for additional blood transfusion. Consider PRBCs transfusion for hg < 8. WBC 20.1, likely reactive. No clear infectious source identified. ID consulted. Await ID recs  --BMP unremarkable  --Continue supportive care    May 12, 2019 biopsy of breast mass and axillary lymph node will be in clinic on Tuesday.  Mrs Colin will get name of medical oncologist in [place] who took care of her mother who had uterine cancer. Once have name of oncologist will make phone call to get clinic appointment.    May 11th  CT guided biopsy of renal mass has been arranged with Radiology for Monday May 13th.  Biopsy of breast and axillary mass will be done on Tuesday May 14th in outpatient clinic at [facility].  Have asked Mrs. Colin to let me know the name of medical oncologist in [place] so we can coordinate oncology care.  Mrs. Colin would like to be treated in [place] as it is much closer to home, where she lives with her mother.  Her daughter lives in [place], but has roommates and so she  cannot stay and get care in [place].    May 9, 2019-Unable to perform breast biopsy/imaging inpatient, however it has been arranged for patient to be transferred to [facility] outpatient clinic to have biopsy performed today. She will then transfer back to hospital to complete radiation x 5 days to the right chest wall per Rad/Onc in hopes to control bleeding from right breast mass. Patient will then need to be arranged for outpatient follow up for further treatment recommendations. Hemoglobin 8.2. No urgent indication for blood transfusion. Transfuse if hemoglobin <8. CBC otherwise unremarkable. We will hold off on Kidney mass workup as this can be done outpatient. Patient reports known history of renal mass since 2014. This is likely slow growing and will likely not be an acute issue for this patient. Patient with known difficult social situation. She is likely to need care closer to her home town which is in [place], [place]. No family here in [place] that can provide her with living arrangements. Recommend social work consult to see if they could be of any assistance to patient.     May 10, 2019--Patient did have bilateral breast US and bilateral mammogram on yesterday at [facility]. Highly suspicious for malignancy. Needs biopsies. IR consult placed today for possible axillary node biopsy and renal mass biopsy to help expedite diagnosing and treatment options for patient. Patient has received 1 of 5 planned inpatient radiation treatment in hopes to control bleeding of right breast tumor. Hemoglobin 7.8. Patient asymptomatic. No urgent need for blood transfusion. Transfuse if hemoglobin < 7 or anemia symptoms. Patient plans to seek follow up care post discharge with Oncologist near home who previously treated her mother for cancer. She is unsure of the MDs exact name. We will be happy to assist with transferring records as necessary    5/13/19 Patient to have biopsy of breast mass/axillary outpatient tomorrow.   She is to have day 3 of 5 day scheduled radiation today.  She states that she would like have cancer treatment continued in [place].  She states the name of facility to send records to is [facility] in [place] - phone number [phone].  She states would like Dr. Dayron Vargas to receive records; however is unsure if he is still practicing.  If not practicing would like records to still go to this center and would choose an oncologist within the practice.  Charge nurse on 5th floor is to have records sent.      5/14/19 Biopsy of right breast scheduled for today.  Patient had her 3rd dose of radiation to her right breast tumor today.  States no longer oozing.  Discussed the need to schedule an appointment with her oncologist of choice at the [facility] in [place] on next Friday to discuss results and plan of care.  Her and her daughter verbalized understanding.  She was given Dr. Pepper's care and phone number in case of any questions.  Charge nurse sending records to her new oncologist's office.      5/15/19 Had biopsy of bilateral breast yesterday as well as axillary lymph node biopsy.  Had 4th dose of radiation to right breast today.  Daughter and patient request to be discharged today and understand that will not have the previously scheduled 5th dose radiation treatment due to discharge.  Daughter is to call and schedule an appointment with oncologist at [facility] in [place], [place] per patient's request.  Daughter was told a referral was needed in order to schedule an appointment.  Will take care of this.  She is to follow up for results of breast and kidney biopsy next Friday with her outpatient oncologist - to be determined for continued treatment.  Dr. Pepper's business card with personal cell phone number was provided to the patient per Dr. Pepper's request in case the patient had questions.      Left renal mass  May 8, 2019  --Outpatient  consult to Urology for further workup. No urgent need for inpatient biopsy. Likely slow growing    May 10, 2019  --Patient will be inpatient next few days for radiation for bleeding control.  Consult IR for possible needle biopsy    May 12, 2019  FNA biopsy of kidney mass will be tomorrow with IR.    5/13/19 Awaiting biopsy of kidney mass today.    5/14/19 Biopsy done yesterday afternoon.  Patient tolerated well.  She is aware that she will need to follow up with her oncologist in [place] to discuss results and plan of care.  Discussed setting up MyChart with patient's daughter so that they could have access to her records.  Charge nurse, Latrice Moses, sending records to her [facility] cancer center in [place].          Thank you for your consult. I will follow-up with patient. Please contact us if you have any additional questions.     Jamia Katz NP  Hematology/Oncology  [facility]

## 2019-05-15 NOTE — DISCHARGE SUMMARY
"[facility] Hospital Medicine  Discharge Summary      Patient Name: Isadora Colin  MRN: [id number]  Admission Date: 5/7/2019  Hospital Length of Stay: 8 days  Discharge Date and Time:  05/15/2019 11:37 AM  Attending Physician: Danial Price MD   Discharging Provider: Lynn Son NP  Primary Care Provider: JOLENE PHIPPS III, MD      HPI:   Isadora Colin is a 58 year old female with history of morbid obesity, peripheral edema,chronic venous stasis dermatitis who presented to [facility] on May 6, 2018 for further evaluation of bleeding right breast mass. She recalls noticing the breast mass around 4 weeks ago or "so". This was never evaluated by a physician. She denies fever, abdominal pain, chills, or weight loss. In ED, she was noted hypotensive (79 systolic) without tachycardia which responded to IVF's. Labs reflected lactic acidosis, leukocytosis(23k), and normocytic anemia. Lactic acidosis resolved after IVF's.  CT Chest revealed 8,3 cm right breast mass, 4.7 cm left breast mass, mass involving right chest wall and rib, and surrounding lymph node involvement. She was treated empirically with Zosyn and Vancomycin empirically. Imaging negative for infectious process. [facility] contacted for transfer for further sepsis work up and Oncology consult. She had not further bleeding from protruding breast mass. She has received 2 units of blood. She has never had a mammogram or colonoscopy. She takes Lasix for swelling, but denies having an Echocardiogram.                     * No surgery found *      Hospital Course:   Isadora Canas is a 58 year old female who was admitted to [facility] for further evaluation of right breast mass and leukocytosis. CT at previous facility shows 8 cm right breast mass, 4 cm left breast mass, and lung/bone/lymph node involvement suspicious for metastatic disease. Oncology consulted who recommended general Surgery " consult for biopsy of breast mass. General Surgery will perform biopsy once she is clinically stable and sepsis rule out or treated. CT abdomen Pelvis now shows 5.4 cm renal mass. Will need outpatient biopsy for this. For leukocytosis, patient was empirically treated with IV Vancomycin and Zosyn. CT chest did not suggest infection. Repeat labs 30k leukocytosis and now down to 20k. She will continue Cefepime and Vancomycin empirically. Infectious Disease consulted. 5/9 - pt took leave and had mammogram and U/S and has returned. WBC normalized and ID suspected leukocytosis was of inflammatory origin. Antibiotics discontinued. Had markings for radiation and will have initial radiation treatments to right breast to prevent further bleeding. Right axillary lymph node biopsy pending. Oncology will arrange follow up appointments in [facility] as pt will discharge home and continue treatments/diagnostic work up there. 5/10 - Radiation treatments in progress for breast mass. Pt will have renal mass biopsy by IR on Monday 5/13 and on 5/14 will have breast/lymph node biopsy at clinic then discharge to home. 5/14/19-Patient underwent 3rd radiation treatment, tolerated well. Bilateral breast/lymph node biopsy at [facility] today. 5/15/19- Patient had 4th dose of radiation to right breast today. Daughter and patient request to be discharged today and understand that she will not have the previously scheduled 5th dose radiation treatment due to discharge.  Daughter is to call and schedule an appointment with oncologist at [facility] in [place], [place] per patient's request.  She is to follow up for results of breast and kidney biopsy next Friday with her outpatient oncologist. Case discussed with Dr. Price. Patient seen and examined on the date of discharge and found suitable for discharge.             Consults:   Consults (From admission, onward)        Status Ordering Provider     Inpatient consult to  General Surgery  Once     Provider:  Clyde Moreno MD    Completed GILLIAN VALLEJO     Inpatient consult to Infectious Diseases  Once     Provider:  Zeeshan Segura MD    Acknowledged GILLIAN VALLEJO     Inpatient consult to Interventional Radiology  Once     Provider:  GAMA Davidson    Completed WALI NATION     Inpatient consult to Oncology  Once     Provider:  Clyde Moreno MD    Acknowledged GILLIAN VALLEJO     Inpatient consult to Social Work  Once     Provider:  (Not yet assigned)    ISA Pike            Final Active Diagnoses:    Diagnosis Date Noted POA    PRINCIPAL PROBLEM:  Breast mass, right [N63.10] 05/07/2019 Yes    Axillary adenopathy [R59.0] 05/13/2019 Yes    Left renal mass [N28.89] 05/08/2019 Yes    Breast mass, left [N63.20] 05/08/2019 Yes    Abnormal chest CT - Pulmonary Nodules [R93.89] 05/08/2019 Yes    Morbid obesity [E66.01] 05/07/2019 Yes    Venous stasis dermatitis of both lower extremities [I87.2] 05/07/2019 Yes      Problems Resolved During this Admission:    Diagnosis Date Noted Date Resolved POA    Acute blood loss anemia [D62] 05/07/2019 05/15/2019 Yes    Leukocytosis [D72.829] 05/07/2019 05/14/2019 Yes       Discharged Condition: stable    Disposition: Home or Self Care    Follow Up:  Follow-up Information     JOLENE PHIPPS III, MD In 3 days.    Specialty:  Family Medicine  Why:  hospital follow-up   Contact information:  [address] [place] [address]  [address] [address]  [place] [address] [phone]             [facility] .    Contact information:  [address]   [place] [address]                   Patient Instructions:      Notify your health care provider if you experience any of the following:  severe uncontrolled pain     Notify your health care provider if you experience any of the following:  redness, tenderness, or signs of infection (pain, swelling, redness, odor or green/yellow discharge around incision  site)     Notify your health care provider if you experience any of the following:  difficulty breathing or increased cough     Notify your health care provider if you experience any of the following:  persistent dizziness, light-headedness, or visual disturbances     Notify your health care provider if you experience any of the following:  increased confusion or weakness     Activity as tolerated       Significant Diagnostic Studies: Labs:   BMP:   Recent Labs   Lab 05/14/19  0419 05/15/19  0512    107    137   K 4.2 3.8   CL 93* 95   CO2 36* 33*   BUN 12 13   CREATININE 0.7 0.7   CALCIUM 9.0 9.0   , CMP   Recent Labs   Lab 05/14/19  0419 05/15/19  0512    137   K 4.2 3.8   CL 93* 95   CO2 36* 33*    107   BUN 12 13   CREATININE 0.7 0.7   CALCIUM 9.0 9.0   ANIONGAP 8 9   ESTGFRAFRICA >60 >60   EGFRNONAA >60 >60   , CBC   Recent Labs   Lab 05/14/19  0419 05/15/19  0512   WBC 11.10 8.97   HGB 8.8* 8.6*   HCT 29.7* 29.1*    302    and All labs within the past 24 hours have been reviewed    Pending Diagnostic Studies:     Procedure Component Value Units Date/Time    Ferritin [[id number]] Collected:  05/15/19 0512    Order Status:  Sent Lab Status:  In process Updated:  05/15/19 0910    Specimen:  Blood     Narrative:       Collection has been rescheduled by HEW at 05/15/2019 08:24 Reason:   can be added    Iron and TIBC [[id number]] Collected:  05/15/19 0512    Order Status:  Sent Lab Status:  In process Updated:  05/15/19 0910    Specimen:  Blood     Narrative:       Collection has been rescheduled by HEW at 05/15/2019 08:24 Reason:   can be added    Pathology Tissue Specimen To Pathology, Radiology Biopsy [[id number]] Collected:  05/13/19 1423    Order Status:  Sent Lab Status:  In process Updated:  05/14/19 0738    Specimen:  Kidney, needle biopsy          Medications:  Reconciled Home Medications:      Medication List      START taking these medications    HYDROcodone-acetaminophen   mg per tablet  Commonly known as:  NORCO  Take 1 tablet by mouth every 4 (four) hours as needed.     hydrOXYzine HCl 25 MG tablet  Commonly known as:  ATARAX  Take 1 tablet (25 mg total) by mouth 3 (three) times daily as needed for Itching.     LORazepam 0.5 MG tablet  Commonly known as:  ATIVAN  Take 1 tablet (0.5 mg total) by mouth every 12 (twelve) hours as needed for Anxiety.     miconazole nitrate 2% 2 % Oint  Commonly known as:  MICOTIN  Apply topically 2 (two) times daily.     potassium chloride SA 20 MEQ tablet  Commonly known as:  K-DUR,KLOR-CON  Take 1 tablet (20 mEq total) by mouth once daily.  Start taking on:  5/16/2019  Replaces:  potassium chloride 10 MEQ Cpsr        CONTINUE taking these medications    furosemide 40 MG tablet  Commonly known as:  LASIX  Take 40 mg by mouth once daily.        STOP taking these medications    potassium chloride 10 MEQ Cpsr  Commonly known as:  MICRO-K  Replaced by:  potassium chloride SA 20 MEQ tablet            Indwelling Lines/Drains at time of discharge:   Lines/Drains/Airways          None          Time spent on the discharge of patient: 56 minutes  Patient was seen and examined on the date of discharge and determined to be suitable for discharge.         Lynn Son NP  Department of Hospital Medicine  [facility]

## 2019-05-15 NOTE — PROGRESS NOTES
Pt signed a release for for medical records to be sent to [facility] upon discharge.  I contacted HIM and they stated that they cannot hold a release form until the chart is completed.  They said that the pt would either have to come back in to sign the release form once the chart is completed or she would need to sign ar release for from the facility she is going to and write stat on it and they will fax the records to them at that time. DARIO Palafox notified and will notify pt of above. Disc of images given to patient and daughter to bring to follow up appts per MD order

## 2019-05-15 NOTE — PROGRESS NOTES
[facility] -   General Surgery  Progress Note    Subjective:     History of Present Illness:  The patient was accepted in transfer from the [facility] for a fungating bleeding right breast mass that was suspicious for breast cancer.  She underwent a CT scan of her chest that showed a right and left breast masses, right axillary lymph nodes and likely metastatic disease.  She was admitted by the medical service and surgery Oncology consult were obtained.  The patient had a CT scan of her abdomen that showed a renal mass.    The patient states she noticed a breast mass about 4 weeks ago.  She says it bleeds if it rubs on her clothes.  It is not associated with a significant pain.    Post-Op Info:  * No surgery found *         Interval History:  Biopsies have been completed. Pathology is pending.  Radiation treatment per Radiation Oncology.    Patient can be discharged and follow up with a oncologist in [facility].  The pathology information can be forwarded to both the patient and her treating physicians.    Medications:  Continuous Infusions:  Scheduled Meds:   furosemide  40 mg Intravenous Daily    miconazole nitrate 2%   Topical (Top) BID    potassium chloride  20 mEq Oral Daily     PRN Meds:cyclobenzaprine, HYDROcodone-acetaminophen, hydrOXYzine HCl, LORazepam, ramelteon     Review of patient's allergies indicates:  No Known Allergies  Objective:     Vital Signs (Most Recent):  Temp: 98.8 °F (37.1 °C) (05/15/19 0715)  Pulse: 74 (05/15/19 0736)  Resp: 19 (05/15/19 0715)  BP: 123/74 (05/15/19 0715)  SpO2: (!) 94 % (05/15/19 0736) Vital Signs (24h Range):  Temp:  [97.6 °F (36.4 °C)-98.8 °F (37.1 °C)] 98.8 °F (37.1 °C)  Pulse:  [69-82] 74  Resp:  [18-19] 19  SpO2:  [94 %-100 %] 94 %  BP: (116-133)/(56-74) 123/74     Weight: (!) 149.2 kg (329 lb)  Body mass index is 53.1 kg/m².    Intake/Output - Last 3 Shifts       05/13 0700 - 05/14 0659 05/14 0700 - 05/15 0659 05/15 0700 - 05/16 0659    P.O. 600       Total Intake(mL/kg) 600 (4)      Urine (mL/kg/hr)  0 (0)     Stool  1     Total Output  1     Net +600 -1            Urine Occurrence 7 x 3 x     Stool Occurrence 1 x 1 x           Physical Exam   Constitutional: No distress.   Morbidly obese   Cardiovascular: Normal rate and regular rhythm.   Pulmonary/Chest: Effort normal and breath sounds normal.   Abdominal: Soft. Bowel sounds are normal.   Obese   Skin:   Exophytic  mass right breast   Nursing note and vitals reviewed.      Significant Labs:  CBC:   Recent Labs   Lab 05/15/19  0512   WBC 8.97   RBC 3.13*   HGB 8.6*   HCT 29.1*      MCV 93   MCH 27.5   MCHC 29.6*     BMP:   Recent Labs   Lab 05/15/19  0512         K 3.8   CL 95   CO2 33*   BUN 13   CREATININE 0.7   CALCIUM 9.0     CMP:   Recent Labs   Lab 05/15/19  0512      CALCIUM 9.0      K 3.8   CO2 33*   CL 95   BUN 13   CREATININE 0.7       Significant Diagnostics:  Bilateral breast and axillary biopsies yesterday    Assessment/Plan:     * Breast mass, right  Highly concerning for breast cancer.    Biopsy completed.  Pathology pending.  Pathology information will be forwarded to her treating doctors in [facility]    Axillary adenopathy  Biopsy completed.  Pathology pending.  Pathology information will be forwarded to her treating doctors in [facility]      Abnormal chest CT - Pulmonary Nodules  Likely represents metastatic disease    Breast mass, left  Concerning for breast cancer on mammogram and ultrasound.    Biopsy completed.  Pathology pending.  Pathology information will be forwarded to her treating doctors in [facility]      Left renal mass  Further management and workup per Oncology/hospital Medicine  CT biopsy complete.  Await pathology    Venous stasis dermatitis of both lower extremities  Management per Medicine    Morbid obesity  This should be addressed with her primary care doctor after discharge      Patient should be given copies of all her imaging  reports  prior to discharge      The surgical plan would be to treat her with neoadjuvant chemotherapy and reimage her and determine if any surgical intervention would be required for the breast masses/axillary adenopathy  Rufus Murphy MD  General Surgery  [facility]

## 2019-05-15 NOTE — SUBJECTIVE & OBJECTIVE
Interval History:  Biopsies have been completed. Pathology is pending.  Radiation treatment per Radiation Oncology.    Patient can be discharged and follow up with a oncologist in [facility].  The pathology information can be forwarded to both the patient and her treating physicians.    Medications:  Continuous Infusions:  Scheduled Meds:   furosemide  40 mg Intravenous Daily    miconazole nitrate 2%   Topical (Top) BID    potassium chloride  20 mEq Oral Daily     PRN Meds:cyclobenzaprine, HYDROcodone-acetaminophen, hydrOXYzine HCl, LORazepam, ramelteon     Review of patient's allergies indicates:  No Known Allergies  Objective:     Vital Signs (Most Recent):  Temp: 98.8 °F (37.1 °C) (05/15/19 0715)  Pulse: 74 (05/15/19 0736)  Resp: 19 (05/15/19 0715)  BP: 123/74 (05/15/19 0715)  SpO2: (!) 94 % (05/15/19 0736) Vital Signs (24h Range):  Temp:  [97.6 °F (36.4 °C)-98.8 °F (37.1 °C)] 98.8 °F (37.1 °C)  Pulse:  [69-82] 74  Resp:  [18-19] 19  SpO2:  [94 %-100 %] 94 %  BP: (116-133)/(56-74) 123/74     Weight: (!) 149.2 kg (329 lb)  Body mass index is 53.1 kg/m².    Intake/Output - Last 3 Shifts       05/13 0700 - 05/14 0659 05/14 0700 - 05/15 0659 05/15 0700 - 05/16 0659    P.O. 600      Total Intake(mL/kg) 600 (4)      Urine (mL/kg/hr)  0 (0)     Stool  1     Total Output  1     Net +600 -1            Urine Occurrence 7 x 3 x     Stool Occurrence 1 x 1 x           Physical Exam   Constitutional: No distress.   Morbidly obese   Cardiovascular: Normal rate and regular rhythm.   Pulmonary/Chest: Effort normal and breath sounds normal.   Abdominal: Soft. Bowel sounds are normal.   Obese   Skin:   Exophytic  mass right breast   Nursing note and vitals reviewed.      Significant Labs:  CBC:   Recent Labs   Lab 05/15/19  0512   WBC 8.97   RBC 3.13*   HGB 8.6*   HCT 29.1*      MCV 93   MCH 27.5   MCHC 29.6*     BMP:   Recent Labs   Lab 05/15/19  0512         K 3.8   CL 95   CO2 33*   BUN 13   CREATININE 0.7    CALCIUM 9.0     CMP:   Recent Labs   Lab 05/15/19  0512      CALCIUM 9.0      K 3.8   CO2 33*   CL 95   BUN 13   CREATININE 0.7       Significant Diagnostics:  Bilateral breast and axillary biopsies yesterday

## 2019-05-15 NOTE — ASSESSMENT & PLAN NOTE
Highly concerning for breast cancer.    Biopsy completed.  Pathology pending.  Pathology information will be forwarded to her treating doctors in [facility]

## 2019-05-15 NOTE — SUBJECTIVE & OBJECTIVE
Interval History: Resting comfortable, afebrile. Daughter at bedside during assessment. Awaiting 4th radiation treatment.  Daughter requesting for patient to be discharged today - patient is in agreement and understands that she will not receive her previously scheduled 5th dose of radiation.    Oncology Treatment Plan:   [No treatment plan]    Medications:  Continuous Infusions:  Scheduled Meds:   furosemide  40 mg Intravenous Daily    miconazole nitrate 2%   Topical (Top) BID    potassium chloride  20 mEq Oral Daily     PRN Meds:cyclobenzaprine, HYDROcodone-acetaminophen, hydrOXYzine HCl, LORazepam, ramelteon     Review of Systems   Constitutional: Positive for activity change. Negative for fever.   HENT: Negative.    Eyes: Negative.    Respiratory: Negative.    Cardiovascular: Negative.    Gastrointestinal: Negative.    Endocrine: Negative.    Genitourinary: Negative.    Musculoskeletal: Negative.    Skin: Positive for rash and wound.   Allergic/Immunologic: Positive for immunocompromised state.   Neurological: Negative.    Hematological: Negative.    Psychiatric/Behavioral: Negative.      Objective:     Vital Signs (Most Recent):  Temp: 98.8 °F (37.1 °C) (05/15/19 0715)  Pulse: 74 (05/15/19 0736)  Resp: 19 (05/15/19 0715)  BP: 123/74 (05/15/19 0715)  SpO2: (!) 94 % (05/15/19 0736) Vital Signs (24h Range):  Temp:  [97.6 °F (36.4 °C)-98.8 °F (37.1 °C)] 98.8 °F (37.1 °C)  Pulse:  [69-82] 74  Resp:  [18-19] 19  SpO2:  [94 %-100 %] 94 %  BP: (116-133)/(56-74) 123/74     Weight: (!) 149.2 kg (329 lb)  Body mass index is 53.1 kg/m².  Body surface area is 2.64 meters squared.      Intake/Output Summary (Last 24 hours) at 5/15/2019 1055  Last data filed at 5/15/2019 1042  Gross per 24 hour   Intake 180 ml   Output 1 ml   Net 179 ml       Physical Exam   Constitutional: She is oriented to person, place, and time. She appears well-developed and well-nourished.   HENT:   Head: Normocephalic and atraumatic.   Eyes:  Conjunctivae and EOM are normal.   Neck: Normal range of motion. Neck supple.   Cardiovascular: Normal rate and regular rhythm.   Pulmonary/Chest: Effort normal and breath sounds normal.   Abdominal: Soft. Bowel sounds are normal.   Musculoskeletal: Normal range of motion.   Neurological: She is alert and oriented to person, place, and time.   Skin: Skin is warm and dry. Rash noted.   Psychiatric: Her speech is normal and behavior is normal. Judgment and thought content normal. Her mood appears anxious. Cognition and memory are normal. She exhibits a depressed mood. She is attentive.   Nursing note and vitals reviewed.      Significant Labs:   All pertinent labs from the last 24 hours have been reviewed.    Diagnostic Results:  I have reviewed all pertinent imaging results/findings within the past 24 hours.

## 2019-05-16 NOTE — PLAN OF CARE
05/16/19 0727   Final Note   Assessment Type Final Discharge Note   Anticipated Discharge Disposition Home   Right Care Referral Info   Post Acute Recommendation No Care

## 2019-05-18 ENCOUNTER — TELEPHONE (OUTPATIENT)
Dept: SURGERY | Facility: HOSPITAL | Age: 59
End: 2019-05-18

## 2019-05-18 NOTE — TELEPHONE ENCOUNTER
Attempted to contact patient with her pathology results.  The cell phones were either an active, no answers are voice mail was not available

## 2019-05-20 NOTE — PHYSICIAN QUERY
PT Name: Isadora Colin  MR #: [id number]    Physician Query Form - Pathology Findings Clarification     CDS: Rosanna Betancur RN   Contact information:[email or web address]    This form is a permanent document in the medical record.     Query Date: May 20, 2019      By submitting this query, we are merely seeking further clarification of documentation.  Please utilize your independent clinical judgment when addressing the question(s) below.      The medical record contains the following:     Findings Supporting Clinical Information Location in Medical Record   Invasive ductal carcinoma   1. Right breast, central, core biopsy:  Invasive ductal carcinoma, Grade 2 (T= 3, N=3, M=1).  Extensive necrosis is present.  Tumor measures 5 mm (0.5 cm) in greatest linear dimension within the core biopsy specimen.  Immunohistochemical stains for hormonal receptors are completed on the tumor cells and reveal the following: Estrogen receptor: Negative; 0% tumor cell staining.  Progesterone receptor: Negative; 0% tumor cell staining.  Her2: Negative (Stain score = 0)  Ki-67: Positive staining in over 95% of tumor cells.  All immunohistochemical stains have satisfactory positive and negative controls.  2. Right axilla, core biopsy: Fibroadipose tissue with extensive necrosis and rare scattered highly atypical cells consistent with degenerating tumor cells. Immunohistochemical stain for cytokeratin shows positive staining in necrotic debris, supporting the above diagnosis.  3. Left breast, central, core biopsy:  Sclerosing intraductal papilloma with atypia, see note.  Note: Immunohistochemical stain for p63 shows positive staining around the area of papilloma, however there is a focal area of more solid proliferation that shows nuclear atypia adjacent to the area of papilloma. P63 staining shows some positivity within these solid atypical areas. The surrounding stroma shows sclerotic appearance in this is worrisome for malignant  process. With the morphology as well as the p63 staining this is best classified as atypical papilloma and additional tissue with excisional biopsy is recommended for further evaluation as clinically indicated to definitively rule out a malignant process. Correlate clinically.   Surgical Path 5/14     Please document the clinical significance of the Pathologists findings of Invasive Ductal Carcinoma.    [ X ] I agree with the Pathology Findings   [   ] I do not agree with the Pathology Findings   [   ] Other/Clarification of Findings:   [   ] Clinically Insignificant   [  ] Clinically Undetermined       Please document in your progress notes daily for the duration of treatment until resolved and include in your discharge summary.

## 2019-05-23 NOTE — PHYSICIAN QUERY
PT Name: Isadora Colin  MR #: [id number]    Physician Query Form - Pathology Findings Clarification     CDS: Rosanna Betancur RN     Contact information:[email or web address]    This form is a permanent document in the medical record.     Query Date: May 23, 2019      By submitting this query, we are merely seeking further clarification of documentation.  Please utilize your independent clinical judgment when addressing the question(s) below.      The medical record contains the following:     Findings Supporting Clinical Information Location in Medical Record   Clear Cell Renal Carcinoma   Left kidney mass, biopsy:  -Clear cell renal cell carcinoma, ISUP nuclear grade 2.      Surgical Path 5/13       Please document the clinical significance of the Pathologists findings of Clear Cell Renal Carcinoma.    [ X ] I agree with the Pathology Findings   [   ] I do not agree with the Pathology Findings   [   ] Other/Clarification of Findings:   [   ] Clinically Insignificant   [  ] Clinically Undetermined       Please document in your progress notes daily for the duration of treatment until resolved and include in your discharge summary.

## 2020-08-06 NOTE — ASSESSMENT & PLAN NOTE
-infectious etiology not found  --reactive vs ?sepsis which is unlikely  --CT Chest negative for infectious process. CT abdomen for further evaluation  --empiric Cefepime and Vancomycin    5/8/19  30k>>20k today  Unlikely sepsis  Empiric Cefepime and Vancomycin  Infectious Disease consulted     Azithromycin Pregnancy And Lactation Text: This medication is considered safe during pregnancy and is also secreted in breast milk.